# Patient Record
Sex: FEMALE | Race: WHITE | NOT HISPANIC OR LATINO
[De-identification: names, ages, dates, MRNs, and addresses within clinical notes are randomized per-mention and may not be internally consistent; named-entity substitution may affect disease eponyms.]

---

## 2017-03-06 ENCOUNTER — APPOINTMENT (OUTPATIENT)
Dept: INFUSION THERAPY | Facility: CLINIC | Age: 72
End: 2017-03-06

## 2017-03-06 ENCOUNTER — LABORATORY RESULT (OUTPATIENT)
Age: 72
End: 2017-03-06

## 2017-03-06 ENCOUNTER — APPOINTMENT (OUTPATIENT)
Dept: HEMATOLOGY ONCOLOGY | Facility: CLINIC | Age: 72
End: 2017-03-06

## 2017-03-06 VITALS
WEIGHT: 157 LBS | HEIGHT: 64 IN | TEMPERATURE: 98.5 F | DIASTOLIC BLOOD PRESSURE: 84 MMHG | BODY MASS INDEX: 26.8 KG/M2 | HEART RATE: 75 BPM | SYSTOLIC BLOOD PRESSURE: 144 MMHG

## 2017-03-06 LAB
HCT VFR BLD CALC: 46.4 %
HGB BLD-MCNC: 15.2 G/DL
MCHC RBC-ENTMCNC: 29.2 PG
MCHC RBC-ENTMCNC: 32.8 GM/DL
MCV RBC AUTO: 89 FL
PLATELET # BLD AUTO: 402 K/UL
RBC # BLD: 5.22 M/UL
RBC # FLD: 14.4 %
WBC # FLD AUTO: 6.7 K/UL

## 2017-03-13 LAB
25(OH)D3 SERPL-MCNC: 28.4 NG/ML
ALBUMIN SERPL ELPH-MCNC: 4.1 G/DL
ALP BLD-CCNC: 80 U/L
ALT SERPL-CCNC: 15 U/L
ANION GAP SERPL CALC-SCNC: 15 MMOL/L
AST SERPL-CCNC: 17 U/L
BILIRUB SERPL-MCNC: 0.4 MG/DL
BUN SERPL-MCNC: 20 MG/DL
CALCIUM SERPL-MCNC: 9.8 MG/DL
CHLORIDE SERPL-SCNC: 97 MMOL/L
CO2 SERPL-SCNC: 23 MMOL/L
CREAT SERPL-MCNC: 0.84 MG/DL
GLUCOSE SERPL-MCNC: 84 MG/DL
POTASSIUM SERPL-SCNC: 5.1 MMOL/L
PROT SERPL-MCNC: 6.9 G/DL
SODIUM SERPL-SCNC: 135 MMOL/L

## 2017-03-20 ENCOUNTER — LABORATORY RESULT (OUTPATIENT)
Age: 72
End: 2017-03-20

## 2017-03-20 ENCOUNTER — APPOINTMENT (OUTPATIENT)
Dept: INFUSION THERAPY | Facility: CLINIC | Age: 72
End: 2017-03-20

## 2017-03-20 VITALS — DIASTOLIC BLOOD PRESSURE: 77 MMHG | TEMPERATURE: 98.5 F | SYSTOLIC BLOOD PRESSURE: 124 MMHG | HEART RATE: 75 BPM

## 2017-03-20 LAB
HCT VFR BLD CALC: 43 %
HGB BLD-MCNC: 14.1 G/DL
MCHC RBC-ENTMCNC: 29 PG
MCHC RBC-ENTMCNC: 32.9 GM/DL
MCV RBC AUTO: 88.2 FL
PLATELET # BLD AUTO: 399 K/UL
RBC # BLD: 4.87 M/UL
RBC # FLD: 14.8 %
WBC # FLD AUTO: 6.4 K/UL

## 2017-03-27 ENCOUNTER — EMERGENCY (EMERGENCY)
Facility: HOSPITAL | Age: 72
LOS: 0 days | Discharge: HOME | End: 2017-03-27
Attending: EMERGENCY MEDICINE | Admitting: EMERGENCY MEDICINE
Payer: MEDICARE

## 2017-03-27 VITALS
OXYGEN SATURATION: 100 % | DIASTOLIC BLOOD PRESSURE: 87 MMHG | WEIGHT: 119.93 LBS | SYSTOLIC BLOOD PRESSURE: 135 MMHG | RESPIRATION RATE: 14 BRPM | HEIGHT: 64 IN | HEART RATE: 94 BPM | TEMPERATURE: 32 F

## 2017-03-27 VITALS
DIASTOLIC BLOOD PRESSURE: 82 MMHG | OXYGEN SATURATION: 99 % | TEMPERATURE: 98 F | SYSTOLIC BLOOD PRESSURE: 147 MMHG | HEART RATE: 70 BPM | RESPIRATION RATE: 16 BRPM

## 2017-03-27 DIAGNOSIS — Z90.710 ACQUIRED ABSENCE OF BOTH CERVIX AND UTERUS: Chronic | ICD-10-CM

## 2017-03-27 DIAGNOSIS — R69 ILLNESS, UNSPECIFIED: ICD-10-CM

## 2017-03-27 DIAGNOSIS — Z79.899 OTHER LONG TERM (CURRENT) DRUG THERAPY: ICD-10-CM

## 2017-03-27 DIAGNOSIS — F31.9 BIPOLAR DISORDER, UNSPECIFIED: ICD-10-CM

## 2017-03-27 DIAGNOSIS — R44.2 OTHER HALLUCINATIONS: ICD-10-CM

## 2017-03-27 LAB
ALBUMIN SERPL ELPH-MCNC: 3.6 G/DL — SIGNIFICANT CHANGE UP (ref 3.3–5)
ALP SERPL-CCNC: 84 U/L — SIGNIFICANT CHANGE UP (ref 40–120)
ALT FLD-CCNC: 15 U/L — SIGNIFICANT CHANGE UP (ref 12–78)
AMPHET UR-MCNC: NEGATIVE — SIGNIFICANT CHANGE UP
ANION GAP SERPL CALC-SCNC: 8 MMOL/L — SIGNIFICANT CHANGE UP (ref 5–17)
APAP SERPL-MCNC: <2 UG/ML — LOW (ref 10–30)
APPEARANCE UR: CLEAR — SIGNIFICANT CHANGE UP
AST SERPL-CCNC: 14 U/L — LOW (ref 15–37)
BARBITURATES UR SCN-MCNC: NEGATIVE — SIGNIFICANT CHANGE UP
BASOPHILS # BLD AUTO: 0.1 K/UL — SIGNIFICANT CHANGE UP (ref 0–0.2)
BASOPHILS NFR BLD AUTO: 1.4 % — SIGNIFICANT CHANGE UP (ref 0–2)
BENZODIAZ UR-MCNC: NEGATIVE — SIGNIFICANT CHANGE UP
BILIRUB SERPL-MCNC: 0.4 MG/DL — SIGNIFICANT CHANGE UP (ref 0.2–1.2)
BILIRUB UR-MCNC: NEGATIVE — SIGNIFICANT CHANGE UP
BUN SERPL-MCNC: 15 MG/DL — SIGNIFICANT CHANGE UP (ref 7–23)
CALCIUM SERPL-MCNC: 9.2 MG/DL — SIGNIFICANT CHANGE UP (ref 8.5–10.1)
CHLORIDE SERPL-SCNC: 105 MMOL/L — SIGNIFICANT CHANGE UP (ref 96–108)
CO2 SERPL-SCNC: 27 MMOL/L — SIGNIFICANT CHANGE UP (ref 22–31)
COCAINE METAB.OTHER UR-MCNC: NEGATIVE — SIGNIFICANT CHANGE UP
COLOR SPEC: YELLOW — SIGNIFICANT CHANGE UP
CREAT SERPL-MCNC: 0.78 MG/DL — SIGNIFICANT CHANGE UP (ref 0.5–1.3)
DIFF PNL FLD: NEGATIVE — SIGNIFICANT CHANGE UP
EOSINOPHIL # BLD AUTO: 0.1 K/UL — SIGNIFICANT CHANGE UP (ref 0–0.5)
EOSINOPHIL NFR BLD AUTO: 0.7 % — SIGNIFICANT CHANGE UP (ref 0–6)
ETHANOL SERPL-MCNC: <10 MG/DL — SIGNIFICANT CHANGE UP (ref 0–10)
GLUCOSE SERPL-MCNC: 89 MG/DL — SIGNIFICANT CHANGE UP (ref 70–99)
GLUCOSE UR QL: NEGATIVE MG/DL — SIGNIFICANT CHANGE UP
HCT VFR BLD CALC: 45 % — SIGNIFICANT CHANGE UP (ref 34.5–45)
HGB BLD-MCNC: 14.9 G/DL — SIGNIFICANT CHANGE UP (ref 11.5–15.5)
KETONES UR-MCNC: NEGATIVE — SIGNIFICANT CHANGE UP
LEUKOCYTE ESTERASE UR-ACNC: NEGATIVE — SIGNIFICANT CHANGE UP
LYMPHOCYTES # BLD AUTO: 1.8 K/UL — SIGNIFICANT CHANGE UP (ref 1–3.3)
LYMPHOCYTES # BLD AUTO: 21.3 % — SIGNIFICANT CHANGE UP (ref 13–44)
MCHC RBC-ENTMCNC: 29.4 PG — SIGNIFICANT CHANGE UP (ref 27–34)
MCHC RBC-ENTMCNC: 33.2 GM/DL — SIGNIFICANT CHANGE UP (ref 32–36)
MCV RBC AUTO: 88.6 FL — SIGNIFICANT CHANGE UP (ref 80–100)
METHADONE UR-MCNC: NEGATIVE — SIGNIFICANT CHANGE UP
MONOCYTES # BLD AUTO: 0.8 K/UL — SIGNIFICANT CHANGE UP (ref 0–0.9)
MONOCYTES NFR BLD AUTO: 9.4 % — SIGNIFICANT CHANGE UP (ref 2–14)
NEUTROPHILS # BLD AUTO: 5.8 K/UL — SIGNIFICANT CHANGE UP (ref 1.8–7.4)
NEUTROPHILS NFR BLD AUTO: 67.2 % — SIGNIFICANT CHANGE UP (ref 43–77)
NITRITE UR-MCNC: NEGATIVE — SIGNIFICANT CHANGE UP
OPIATES UR-MCNC: NEGATIVE — SIGNIFICANT CHANGE UP
PCP SPEC-MCNC: SIGNIFICANT CHANGE UP
PCP UR-MCNC: NEGATIVE — SIGNIFICANT CHANGE UP
PH UR: 7 — SIGNIFICANT CHANGE UP (ref 4.8–8)
PLATELET # BLD AUTO: 475 K/UL — HIGH (ref 150–400)
POTASSIUM SERPL-MCNC: 4.4 MMOL/L — SIGNIFICANT CHANGE UP (ref 3.5–5.3)
POTASSIUM SERPL-SCNC: 4.4 MMOL/L — SIGNIFICANT CHANGE UP (ref 3.5–5.3)
PROT SERPL-MCNC: 7.3 GM/DL — SIGNIFICANT CHANGE UP (ref 6–8.3)
PROT UR-MCNC: NEGATIVE MG/DL — SIGNIFICANT CHANGE UP
RBC # BLD: 5.07 M/UL — SIGNIFICANT CHANGE UP (ref 3.8–5.2)
RBC # FLD: 14.6 % — HIGH (ref 10.3–14.5)
SALICYLATES SERPL-MCNC: 3.4 MG/DL — SIGNIFICANT CHANGE UP (ref 2.8–20)
SODIUM SERPL-SCNC: 140 MMOL/L — SIGNIFICANT CHANGE UP (ref 135–145)
SP GR SPEC: 1 — LOW (ref 1.01–1.02)
THC UR QL: NEGATIVE — SIGNIFICANT CHANGE UP
UROBILINOGEN FLD QL: NEGATIVE MG/DL — SIGNIFICANT CHANGE UP
VALPROATE SERPL-MCNC: 59 UG/ML — SIGNIFICANT CHANGE UP (ref 50–100)
WBC # BLD: 8.6 K/UL — SIGNIFICANT CHANGE UP (ref 3.8–10.5)
WBC # FLD AUTO: 8.6 K/UL — SIGNIFICANT CHANGE UP (ref 3.8–10.5)

## 2017-03-27 PROCEDURE — 99284 EMERGENCY DEPT VISIT MOD MDM: CPT

## 2017-03-27 NOTE — ED BEHAVIORAL HEALTH ASSESSMENT NOTE - CURRENT MEDICATION
Zyprexa 20 mg po HS                    vitD3, hydrochorthiazide, Protonix  Depakote 750 mg po HS  Trazodone 50 mg po HS prn sleep

## 2017-03-27 NOTE — ED ADULT NURSE REASSESSMENT NOTE - NS ED NURSE REASSESS COMMENT FT1
pt received. alert and oriented. no signs of distress. breathing even and unlabored. daughter at bedside. awaiting d/c. will monitor.

## 2017-03-27 NOTE — ED BEHAVIORAL HEALTH ASSESSMENT NOTE - SUICIDE PROTECTIVE FACTORS
High spirituality/Positive therapeutic relationships/Future oriented/Responsibility to family and others/Supportive social network or family/Identifies reasons for living

## 2017-03-27 NOTE — ED BEHAVIORAL HEALTH ASSESSMENT NOTE - DETAILS
daughters, son  in  Pt reports had fleeting SI this AM, had no plan or intent for same, but did identify OD when MD asked how she would hurt self depression and father suicided when pt age 27 grandchildren Baljinder Jolly RN

## 2017-03-27 NOTE — ED BEHAVIORAL HEALTH ASSESSMENT NOTE - DESCRIPTION
Calm and cooperative in ED HYN, hyperlipidemia lives at [facility] supported housing and states is comfortable with 2 roommates

## 2017-03-27 NOTE — ED BEHAVIORAL HEALTH ASSESSMENT NOTE - SUMMARY
72 y.o. DF with hx of depression and paranoia. and OD many years ago, cannot recall date.    Sleep was impaired 2 nights ago and today after sleeping well last night experienced racing thoughts and called [facility] and spoke with MD. MD sent police to bring pt. to ED for evaluation. Pt. has remained calm and goal directed, stating that feels better and thoughts no longer racing.   Denies SIIP/HIIP. denies paranoia, and hallucinations of any type.   Is futuristic in her thinking and declines a voluntary admission.    Discussed clinical info with Dr. Childers who has history with pt. during last 5N admission in 12/16. Dr. Childers agrees that pt does not meet criteria for 2PC admission at this time and has support system with housing and clinicians in community. Compliant with meds 72 y.o. DF with hx of depression and paranoia. and OD many years ago, cannot recall date.    Sleep was impaired 2 nights ago and today after sleeping well last night experienced racing thoughts and called [facility] and spoke with MD. MD sent police to bring pt. to ED for evaluation. Pt. has remained calm and goal directed, stating that feels better and thoughts no longer racing.   Denies SIIP/HIIP. denies paranoia, and hallucinations of any type.   Is futuristic in her thinking and declines a voluntary admission.    Discussed clinical info with Dr. Childers who has history with pt. during last 5N admission in 12/16. Dr. Childers agrees that pt does not meet criteria for 2PC admission at this time and has support system with housing and clinicians in community. Compliant with meds  Valproic acid : 59; medically cleared , no s/s UTI or infection.

## 2017-03-27 NOTE — ED BEHAVIORAL HEALTH ASSESSMENT NOTE - OTHER
therapist, Blanca Miller, [facility] family stressors grandchildren substance relapse is worried aboout grandchildren Stressors with grandchildren

## 2017-03-27 NOTE — ED PROVIDER NOTE - OBJECTIVE STATEMENT
73 y/o female BIBA from [facility] for community living, for racing thoughts and stating she wants to harm herself. Last pysch hospitalization was on 12/02/2016 at . Per living facility paperwork, pt has a h/o bipolar disorder. Pt stating she is having racing thoughts and wants to "take medicine to sleep to make them stop".

## 2017-03-27 NOTE — ED BEHAVIORAL HEALTH ASSESSMENT NOTE - RISK ASSESSMENT
completed: Pt dos not present imminent risk to self or others at this time.   She has supports and utilized them appropriately this AM, calling CM services and clinic.   No longer experiencing racing thoughts and has plans for impending Easter holiday with family and is hopeful to get a P/T job.  Family and clinicians are supportive

## 2017-03-27 NOTE — ED PROVIDER NOTE - NS ED MD SCRIBE ATTENDING SCRIBE SECTIONS
REVIEW OF SYSTEMS/HISTORY OF PRESENT ILLNESS/DISPOSITION/PHYSICAL EXAM/PROGRESS NOTE/PAST MEDICAL/SURGICAL/SOCIAL HISTORY/RESULTS

## 2017-03-27 NOTE — ED BEHAVIORAL HEALTH ASSESSMENT NOTE - HPI (INCLUDE ILLNESS QUALITY, SEVERITY, DURATION, TIMING, CONTEXT, MODIFYING FACTORS, ASSOCIATED SIGNS AND SYMPTOMS)
72 y.o. DF with hx of depression and paranoia from report of last admission in 12/16 per Dr. valerio. Pt. has been compliant with medications and states "the trigger" must be the grandchildren. Pt. reports her daughter tells her not to help the children. No call back from daughter  as yet.  Pt. reports that she experienced racing thoughts and called her MD and head of  this AM. MD asked how she would harm self and pt reports"I said pills , it is the way I did many years ago". Pt. states that "I just wanted to sleep to have the feeling pass".   Currently after calmly sitting in ED awaiting evaluation, pt states that she feels much, calmer,  better. "I don't want to die, I want to be with my grandchildren for the Oriental orthodox holiday coming, Easter". Pt has 10 grandchildren in total. Denies current racing thoughts and states wants to go home.   Pt. states that she plans to see therapist on Thursday and MD on WEd, "maybe I need more depakote". Therapist states had told pt. to call if needed for an appt sooner in week. Pt. called this AM. Pt. did not expect police to come to her home.   Denies A/V/O/T hallucinations. No evidence of psychosis. States had difficulty sleeping 2 nights ago, but slept well last night and told her daughter so this AM.   Pt. reports has been applying for jobs as she wants her days to be occupied. Denies current SIIP/HIIP. No paranoia evident.

## 2017-03-27 NOTE — ED PROVIDER NOTE - PSYCHIATRIC, MLM
+racing thoughts. Pt stating she wants to take medicine to go to sleep so the racing thoughts can stop.

## 2017-03-27 NOTE — ED BEHAVIORAL HEALTH ASSESSMENT NOTE - REFERRAL / APPOINTMENT DETAILS
appt scheduled with MD, Dr. Keller on Wed, 3/29/17 and with Blanca Miller 3/30/17. Ms Miller contacting CS, Wen Eng to visit pt tomorrow to further assess

## 2017-03-27 NOTE — ED BEHAVIORAL HEALTH ASSESSMENT NOTE - PATIENT'S CHIEF COMPLAINT
"sad things happen with my daughters' children. She threw them out of the house and they ask me for hel[p"

## 2017-03-27 NOTE — ED BEHAVIORAL HEALTH ASSESSMENT NOTE - PROFESSIONAL COLLATERAL RELATIONSHIP
director of clinic and therapist: pt reportedly called MD this AM and said she was experiencing racing thoughts and SI. When asked how she would proceed stated OD, she wanted to sleep. Upset re: grandchildren with substance abuse. Therapist states had been doing well. DId appear more depressed and anxious last Thurs in session. Encouraged to be strong and call if needed a sooner appt.

## 2017-05-22 ENCOUNTER — APPOINTMENT (OUTPATIENT)
Dept: INFUSION THERAPY | Facility: CLINIC | Age: 72
End: 2017-05-22

## 2017-05-22 ENCOUNTER — LABORATORY RESULT (OUTPATIENT)
Age: 72
End: 2017-05-22

## 2017-05-22 VITALS — SYSTOLIC BLOOD PRESSURE: 120 MMHG | HEART RATE: 90 BPM | DIASTOLIC BLOOD PRESSURE: 75 MMHG | TEMPERATURE: 98.1 F

## 2017-05-22 VITALS — DIASTOLIC BLOOD PRESSURE: 70 MMHG | SYSTOLIC BLOOD PRESSURE: 112 MMHG | HEART RATE: 87 BPM

## 2017-05-22 LAB
HCT VFR BLD CALC: 46.5 %
HGB BLD-MCNC: 15.1 G/DL
MCHC RBC-ENTMCNC: 28.7 PG
MCHC RBC-ENTMCNC: 32.5 GM/DL
MCV RBC AUTO: 88.3 FL
PLATELET # BLD AUTO: 402 K/UL
RBC # BLD: 5.27 M/UL
RBC # FLD: 15.3 %
WBC # FLD AUTO: 5.8 K/UL

## 2017-07-03 ENCOUNTER — APPOINTMENT (OUTPATIENT)
Dept: INFUSION THERAPY | Facility: CLINIC | Age: 72
End: 2017-07-03

## 2017-08-10 ENCOUNTER — LABORATORY RESULT (OUTPATIENT)
Age: 72
End: 2017-08-10

## 2017-08-10 ENCOUNTER — APPOINTMENT (OUTPATIENT)
Dept: INFUSION THERAPY | Facility: CLINIC | Age: 72
End: 2017-08-10
Payer: COMMERCIAL

## 2017-08-10 VITALS — HEART RATE: 76 BPM | TEMPERATURE: 98.6 F | DIASTOLIC BLOOD PRESSURE: 80 MMHG | SYSTOLIC BLOOD PRESSURE: 121 MMHG

## 2017-08-10 LAB
HCT VFR BLD CALC: 43.6 %
HGB BLD-MCNC: 14.5 G/DL
MCHC RBC-ENTMCNC: 29.5 PG
MCHC RBC-ENTMCNC: 33.2 GM/DL
MCV RBC AUTO: 88.7 FL
PLATELET # BLD AUTO: 424 K/UL
RBC # BLD: 4.92 M/UL
RBC # FLD: 14.5 %
WBC # FLD AUTO: 8.2 K/UL

## 2017-08-10 PROCEDURE — 85025 COMPLETE CBC W/AUTO DIFF WBC: CPT | Mod: Q5

## 2017-08-10 PROCEDURE — 36415 COLL VENOUS BLD VENIPUNCTURE: CPT | Mod: Q5

## 2017-09-11 ENCOUNTER — APPOINTMENT (OUTPATIENT)
Dept: HEMATOLOGY ONCOLOGY | Facility: CLINIC | Age: 72
End: 2017-09-11
Payer: COMMERCIAL

## 2017-09-11 ENCOUNTER — LABORATORY RESULT (OUTPATIENT)
Age: 72
End: 2017-09-11

## 2017-09-11 VITALS
DIASTOLIC BLOOD PRESSURE: 75 MMHG | BODY MASS INDEX: 26.98 KG/M2 | HEART RATE: 85 BPM | WEIGHT: 158 LBS | TEMPERATURE: 98.3 F | HEIGHT: 64 IN | SYSTOLIC BLOOD PRESSURE: 124 MMHG

## 2017-09-11 DIAGNOSIS — Z79.899 ENCOUNTER FOR THERAPEUTIC DRUG LVL MONITORING: ICD-10-CM

## 2017-09-11 DIAGNOSIS — Z51.81 ENCOUNTER FOR THERAPEUTIC DRUG LVL MONITORING: ICD-10-CM

## 2017-09-11 LAB
HCT VFR BLD CALC: 46.2 %
HGB BLD-MCNC: 15.4 G/DL
MCHC RBC-ENTMCNC: 29.6 PG
MCHC RBC-ENTMCNC: 33.3 GM/DL
MCV RBC AUTO: 88.8 FL
PLATELET # BLD AUTO: 436 K/UL
RBC # BLD: 5.2 M/UL
RBC # FLD: 14.6 %
WBC # FLD AUTO: 7.7 K/UL

## 2017-09-11 PROCEDURE — 85025 COMPLETE CBC W/AUTO DIFF WBC: CPT

## 2017-09-11 PROCEDURE — 99214 OFFICE O/P EST MOD 30 MIN: CPT | Mod: 25

## 2017-09-11 PROCEDURE — 36415 COLL VENOUS BLD VENIPUNCTURE: CPT

## 2017-09-11 RX ORDER — ASPIRIN 81 MG/1
81 TABLET, DELAYED RELEASE ORAL DAILY
Refills: 0 | Status: ACTIVE | COMMUNITY
Start: 2017-09-11

## 2017-11-06 ENCOUNTER — LABORATORY RESULT (OUTPATIENT)
Age: 72
End: 2017-11-06

## 2017-11-06 ENCOUNTER — APPOINTMENT (OUTPATIENT)
Dept: INFUSION THERAPY | Facility: CLINIC | Age: 72
End: 2017-11-06
Payer: COMMERCIAL

## 2017-11-06 VITALS
HEART RATE: 88 BPM | BODY MASS INDEX: 26.46 KG/M2 | SYSTOLIC BLOOD PRESSURE: 110 MMHG | HEIGHT: 64 IN | WEIGHT: 155 LBS | TEMPERATURE: 98.1 F | DIASTOLIC BLOOD PRESSURE: 71 MMHG

## 2017-11-06 LAB
HCT VFR BLD CALC: 47.3 %
HGB BLD-MCNC: 16.4 G/DL
MCHC RBC-ENTMCNC: 31.1 PG
MCHC RBC-ENTMCNC: 34.6 GM/DL
MCV RBC AUTO: 89.7 FL
PLATELET # BLD AUTO: 418 K/UL
RBC # BLD: 5.27 M/UL
RBC # FLD: 16.5 %
WBC # FLD AUTO: 9.6 K/UL

## 2017-11-06 PROCEDURE — 99195 PHLEBOTOMY: CPT | Mod: 59

## 2017-11-06 PROCEDURE — 85025 COMPLETE CBC W/AUTO DIFF WBC: CPT

## 2017-11-06 PROCEDURE — 36415 COLL VENOUS BLD VENIPUNCTURE: CPT

## 2018-01-08 ENCOUNTER — APPOINTMENT (OUTPATIENT)
Dept: INFUSION THERAPY | Facility: CLINIC | Age: 73
End: 2018-01-08

## 2018-01-25 ENCOUNTER — LABORATORY RESULT (OUTPATIENT)
Age: 73
End: 2018-01-25

## 2018-01-25 ENCOUNTER — APPOINTMENT (OUTPATIENT)
Dept: INFUSION THERAPY | Facility: CLINIC | Age: 73
End: 2018-01-25
Payer: MEDICARE

## 2018-01-25 VITALS
WEIGHT: 155 LBS | HEART RATE: 76 BPM | TEMPERATURE: 98.2 F | DIASTOLIC BLOOD PRESSURE: 80 MMHG | HEIGHT: 64 IN | SYSTOLIC BLOOD PRESSURE: 136 MMHG | BODY MASS INDEX: 26.46 KG/M2

## 2018-01-25 LAB
HCT VFR BLD CALC: 43.6 %
HGB BLD-MCNC: 14.2 G/DL
MCHC RBC-ENTMCNC: 28.7 PG
MCHC RBC-ENTMCNC: 32.5 GM/DL
MCV RBC AUTO: 88.3 FL
PLATELET # BLD AUTO: 481 K/UL
RBC # BLD: 4.93 M/UL
RBC # FLD: 14.3 %
WBC # FLD AUTO: 7.9 K/UL

## 2018-01-25 PROCEDURE — 36415 COLL VENOUS BLD VENIPUNCTURE: CPT | Mod: Q5

## 2018-01-25 PROCEDURE — 85025 COMPLETE CBC W/AUTO DIFF WBC: CPT | Mod: Q5

## 2018-03-12 ENCOUNTER — LABORATORY RESULT (OUTPATIENT)
Age: 73
End: 2018-03-12

## 2018-03-12 ENCOUNTER — APPOINTMENT (OUTPATIENT)
Dept: HEMATOLOGY ONCOLOGY | Facility: CLINIC | Age: 73
End: 2018-03-12
Payer: MEDICARE

## 2018-03-12 VITALS
TEMPERATURE: 98.6 F | BODY MASS INDEX: 26.29 KG/M2 | HEIGHT: 64 IN | HEART RATE: 83 BPM | SYSTOLIC BLOOD PRESSURE: 126 MMHG | WEIGHT: 154 LBS | DIASTOLIC BLOOD PRESSURE: 80 MMHG

## 2018-03-12 DIAGNOSIS — Z82.49 FAMILY HISTORY OF ISCHEMIC HEART DISEASE AND OTHER DISEASES OF THE CIRCULATORY SYSTEM: ICD-10-CM

## 2018-03-12 DIAGNOSIS — E55.9 VITAMIN D DEFICIENCY, UNSPECIFIED: ICD-10-CM

## 2018-03-12 DIAGNOSIS — F17.200 NICOTINE DEPENDENCE, UNSPECIFIED, UNCOMPLICATED: ICD-10-CM

## 2018-03-12 DIAGNOSIS — E78.5 HYPERLIPIDEMIA, UNSPECIFIED: ICD-10-CM

## 2018-03-12 DIAGNOSIS — Z92.3 PERSONAL HISTORY OF IRRADIATION: ICD-10-CM

## 2018-03-12 LAB
HCT VFR BLD CALC: 47.8 %
HGB BLD-MCNC: 15.7 G/DL
MCHC RBC-ENTMCNC: 28.6 PG
MCHC RBC-ENTMCNC: 32.9 GM/DL
MCV RBC AUTO: 87 FL
PLATELET # BLD AUTO: 493 K/UL
RBC # BLD: 5.5 M/UL
RBC # FLD: 16.2 %
WBC # FLD AUTO: 6.4 K/UL

## 2018-03-12 PROCEDURE — 99213 OFFICE O/P EST LOW 20 MIN: CPT | Mod: 25

## 2018-03-12 PROCEDURE — 85025 COMPLETE CBC W/AUTO DIFF WBC: CPT

## 2018-03-12 PROCEDURE — 36415 COLL VENOUS BLD VENIPUNCTURE: CPT

## 2018-03-12 RX ORDER — FLUTICASONE PROPIONATE 50 UG/1
50 SPRAY, METERED NASAL
Refills: 0 | Status: DISCONTINUED | COMMUNITY
End: 2018-03-12

## 2018-03-12 RX ORDER — MELOXICAM 15 MG/1
15 TABLET ORAL
Refills: 0 | Status: DISCONTINUED | COMMUNITY
End: 2018-03-12

## 2018-03-12 RX ORDER — ALBUTEROL 90 MCG
AEROSOL (GRAM) INHALATION
Refills: 0 | Status: DISCONTINUED | COMMUNITY
End: 2018-03-12

## 2018-03-12 RX ORDER — NICOTINE POLACRILEX 2 MG/1
2 GUM, CHEWING BUCCAL
Refills: 0 | Status: DISCONTINUED | COMMUNITY
End: 2018-03-12

## 2018-03-15 ENCOUNTER — APPOINTMENT (OUTPATIENT)
Dept: INFUSION THERAPY | Facility: CLINIC | Age: 73
End: 2018-03-15

## 2018-03-21 ENCOUNTER — APPOINTMENT (OUTPATIENT)
Dept: HEMATOLOGY ONCOLOGY | Facility: CLINIC | Age: 73
End: 2018-03-21

## 2018-03-21 ENCOUNTER — APPOINTMENT (OUTPATIENT)
Dept: INFUSION THERAPY | Facility: CLINIC | Age: 73
End: 2018-03-21

## 2018-03-28 ENCOUNTER — LABORATORY RESULT (OUTPATIENT)
Age: 73
End: 2018-03-28

## 2018-03-28 ENCOUNTER — APPOINTMENT (OUTPATIENT)
Dept: INFUSION THERAPY | Facility: CLINIC | Age: 73
End: 2018-03-28
Payer: MEDICARE

## 2018-03-28 VITALS
DIASTOLIC BLOOD PRESSURE: 84 MMHG | HEIGHT: 64 IN | HEART RATE: 65 BPM | SYSTOLIC BLOOD PRESSURE: 137 MMHG | WEIGHT: 154 LBS | BODY MASS INDEX: 26.29 KG/M2 | TEMPERATURE: 97.9 F

## 2018-03-28 LAB
HCT VFR BLD CALC: 47.7 %
HGB BLD-MCNC: 15.2 G/DL
MCHC RBC-ENTMCNC: 27.8 PG
MCHC RBC-ENTMCNC: 31.8 GM/DL
MCV RBC AUTO: 87.6 FL
PLATELET # BLD AUTO: 456 K/UL
RBC # BLD: 5.44 M/UL
RBC # FLD: 16.8 %
WBC # FLD AUTO: 8.7 K/UL

## 2018-03-28 PROCEDURE — 99195 PHLEBOTOMY: CPT | Mod: 59

## 2018-03-28 PROCEDURE — 36415 COLL VENOUS BLD VENIPUNCTURE: CPT

## 2018-03-28 PROCEDURE — 85025 COMPLETE CBC W/AUTO DIFF WBC: CPT

## 2018-05-21 ENCOUNTER — APPOINTMENT (OUTPATIENT)
Dept: INFUSION THERAPY | Facility: CLINIC | Age: 73
End: 2018-05-21
Payer: MEDICARE

## 2018-05-24 ENCOUNTER — LABORATORY RESULT (OUTPATIENT)
Age: 73
End: 2018-05-24

## 2018-05-24 ENCOUNTER — APPOINTMENT (OUTPATIENT)
Dept: INFUSION THERAPY | Facility: CLINIC | Age: 73
End: 2018-05-24
Payer: MEDICARE

## 2018-05-24 VITALS
TEMPERATURE: 98.5 F | DIASTOLIC BLOOD PRESSURE: 70 MMHG | SYSTOLIC BLOOD PRESSURE: 111 MMHG | BODY MASS INDEX: 26.29 KG/M2 | HEIGHT: 64 IN | WEIGHT: 154 LBS | HEART RATE: 85 BPM

## 2018-05-24 LAB
HCT VFR BLD CALC: 40.8 %
HGB BLD-MCNC: 12.9 G/DL
MCHC RBC-ENTMCNC: 26.9 PG
MCHC RBC-ENTMCNC: 31.7 GM/DL
MCV RBC AUTO: 84.8 FL
PLATELET # BLD AUTO: 491 K/UL
RBC # BLD: 4.81 M/UL
RBC # FLD: 15.4 %
WBC # FLD AUTO: 7.8 K/UL

## 2018-05-24 PROCEDURE — 36415 COLL VENOUS BLD VENIPUNCTURE: CPT

## 2018-05-24 PROCEDURE — 85025 COMPLETE CBC W/AUTO DIFF WBC: CPT

## 2018-08-27 ENCOUNTER — APPOINTMENT (OUTPATIENT)
Dept: HEMATOLOGY ONCOLOGY | Facility: CLINIC | Age: 73
End: 2018-08-27

## 2018-08-31 PROBLEM — F31.9 BIPOLAR DISORDER, UNSPECIFIED: Chronic | Status: ACTIVE | Noted: 2017-04-24

## 2018-09-07 ENCOUNTER — APPOINTMENT (OUTPATIENT)
Dept: HEMATOLOGY ONCOLOGY | Facility: CLINIC | Age: 73
End: 2018-09-07

## 2018-09-07 ENCOUNTER — APPOINTMENT (OUTPATIENT)
Dept: INFUSION THERAPY | Facility: CLINIC | Age: 73
End: 2018-09-07
Payer: MEDICARE

## 2018-09-07 ENCOUNTER — LABORATORY RESULT (OUTPATIENT)
Age: 73
End: 2018-09-07

## 2018-09-07 VITALS — HEART RATE: 85 BPM | TEMPERATURE: 98.1 F | DIASTOLIC BLOOD PRESSURE: 78 MMHG | SYSTOLIC BLOOD PRESSURE: 120 MMHG

## 2018-09-07 PROCEDURE — 85025 COMPLETE CBC W/AUTO DIFF WBC: CPT

## 2018-09-07 PROCEDURE — 99195 PHLEBOTOMY: CPT | Mod: 59

## 2018-09-07 PROCEDURE — 36415 COLL VENOUS BLD VENIPUNCTURE: CPT

## 2018-09-11 PROBLEM — I10 HTN (HYPERTENSION): Status: ACTIVE | Noted: 2018-09-11

## 2018-09-11 PROBLEM — H26.9 BILATERAL CATARACTS: Status: RESOLVED | Noted: 2018-09-11 | Resolved: 2018-09-11

## 2018-09-12 ENCOUNTER — APPOINTMENT (OUTPATIENT)
Dept: HEMATOLOGY ONCOLOGY | Facility: CLINIC | Age: 73
End: 2018-09-12
Payer: MEDICARE

## 2018-09-12 VITALS
WEIGHT: 153 LBS | HEIGHT: 64 IN | SYSTOLIC BLOOD PRESSURE: 137 MMHG | DIASTOLIC BLOOD PRESSURE: 80 MMHG | TEMPERATURE: 98.4 F | HEART RATE: 91 BPM | BODY MASS INDEX: 26.12 KG/M2

## 2018-09-12 DIAGNOSIS — H26.9 UNSPECIFIED CATARACT: ICD-10-CM

## 2018-09-12 DIAGNOSIS — I10 ESSENTIAL (PRIMARY) HYPERTENSION: ICD-10-CM

## 2018-09-12 PROCEDURE — 99213 OFFICE O/P EST LOW 20 MIN: CPT

## 2018-09-12 RX ORDER — DIVALPROEX SODIUM 250 MG/1
250 TABLET, EXTENDED RELEASE ORAL
Refills: 0 | Status: DISCONTINUED | COMMUNITY
End: 2018-09-12

## 2018-10-09 LAB
25(OH)D3 SERPL-MCNC: 50.1 NG/ML
ALBUMIN SERPL ELPH-MCNC: 4 G/DL
ALP BLD-CCNC: 82 U/L
ALT SERPL-CCNC: 7 U/L
ANION GAP SERPL CALC-SCNC: 12 MMOL/L
AST SERPL-CCNC: 16 U/L
BILIRUB SERPL-MCNC: 0.4 MG/DL
BUN SERPL-MCNC: 14 MG/DL
CALCIUM SERPL-MCNC: 9.8 MG/DL
CHLORIDE SERPL-SCNC: 99 MMOL/L
CO2 SERPL-SCNC: 28 MMOL/L
CREAT SERPL-MCNC: 0.84 MG/DL
GLUCOSE SERPL-MCNC: 88 MG/DL
HCT VFR BLD CALC: 47.4 %
HGB BLD-MCNC: 15 G/DL
MCHC RBC-ENTMCNC: 26.9 PG
MCHC RBC-ENTMCNC: 31.7 GM/DL
MCV RBC AUTO: 85 FL
PLATELET # BLD AUTO: 490 K/UL
POTASSIUM SERPL-SCNC: 4.4 MMOL/L
PROT SERPL-MCNC: 6.7 G/DL
RBC # BLD: 5.57 M/UL
RBC # FLD: 17.8 %
SODIUM SERPL-SCNC: 139 MMOL/L
WBC # FLD AUTO: 5.6 K/UL

## 2018-12-07 ENCOUNTER — APPOINTMENT (OUTPATIENT)
Dept: INFUSION THERAPY | Facility: CLINIC | Age: 73
End: 2018-12-07
Payer: MEDICARE

## 2018-12-13 ENCOUNTER — LABORATORY RESULT (OUTPATIENT)
Age: 73
End: 2018-12-13

## 2018-12-13 ENCOUNTER — APPOINTMENT (OUTPATIENT)
Dept: INFUSION THERAPY | Facility: CLINIC | Age: 73
End: 2018-12-13
Payer: MEDICARE

## 2018-12-13 VITALS
HEART RATE: 70 BPM | BODY MASS INDEX: 25.61 KG/M2 | TEMPERATURE: 98.3 F | SYSTOLIC BLOOD PRESSURE: 128 MMHG | DIASTOLIC BLOOD PRESSURE: 77 MMHG | WEIGHT: 150 LBS | HEIGHT: 64 IN

## 2018-12-13 LAB
HCT VFR BLD CALC: 43.6 %
HGB BLD-MCNC: 13.5 G/DL
MCHC RBC-ENTMCNC: 26.3 PG
MCHC RBC-ENTMCNC: 31 GM/DL
MCV RBC AUTO: 84.7 FL
PLATELET # BLD AUTO: 513 K/UL
RBC # BLD: 5.14 M/UL
RBC # FLD: 16.6 %
WBC # FLD AUTO: 7.8 K/UL

## 2018-12-13 PROCEDURE — 36415 COLL VENOUS BLD VENIPUNCTURE: CPT | Mod: Q5

## 2018-12-13 PROCEDURE — 85025 COMPLETE CBC W/AUTO DIFF WBC: CPT | Mod: Q5

## 2019-01-15 ENCOUNTER — TRANSCRIPTION ENCOUNTER (OUTPATIENT)
Age: 74
End: 2019-01-15

## 2019-03-15 ENCOUNTER — LABORATORY RESULT (OUTPATIENT)
Age: 74
End: 2019-03-15

## 2019-03-15 ENCOUNTER — APPOINTMENT (OUTPATIENT)
Dept: HEMATOLOGY ONCOLOGY | Facility: CLINIC | Age: 74
End: 2019-03-15
Payer: MEDICARE

## 2019-03-15 VITALS
BODY MASS INDEX: 25.27 KG/M2 | WEIGHT: 148 LBS | DIASTOLIC BLOOD PRESSURE: 85 MMHG | SYSTOLIC BLOOD PRESSURE: 136 MMHG | TEMPERATURE: 98.4 F | HEIGHT: 64 IN | HEART RATE: 78 BPM

## 2019-03-15 DIAGNOSIS — Z80.3 FAMILY HISTORY OF MALIGNANT NEOPLASM OF BREAST: ICD-10-CM

## 2019-03-15 LAB
HCT VFR BLD CALC: 46.9 %
HGB BLD-MCNC: 15 G/DL
MCHC RBC-ENTMCNC: 26.6 PG
MCHC RBC-ENTMCNC: 31.9 GM/DL
MCV RBC AUTO: 83.5 FL
PLATELET # BLD AUTO: 535 K/UL
RBC # BLD: 5.62 M/UL
RBC # FLD: 17.6 %
WBC # FLD AUTO: 6.9 K/UL

## 2019-03-15 PROCEDURE — 99213 OFFICE O/P EST LOW 20 MIN: CPT | Mod: 25

## 2019-03-15 PROCEDURE — 85025 COMPLETE CBC W/AUTO DIFF WBC: CPT

## 2019-03-15 PROCEDURE — 36415 COLL VENOUS BLD VENIPUNCTURE: CPT

## 2019-03-15 NOTE — PHYSICAL EXAM
[Normal] : affect appropriate [de-identified] : smells of cigarette smoke [de-identified] : anicteric [de-identified] : no thrush or mucositis [de-identified] : no lymphadenopathy [de-identified] : S1S2 RRR, no obvious murmurs [de-identified] : no c/c/e [de-identified] : well healed right periareolar lumpectomy scar and axillary scar; breasts without masses or discharge, no axillary lymphadenopathy   [de-identified] : no rashes

## 2019-03-15 NOTE — HISTORY OF PRESENT ILLNESS
[Disease: _____________________] : Disease: [unfilled] [T: ___] : T[unfilled] [N: ___] : N[unfilled] [M: ___] : M[unfilled] [AJCC Stage: ____] : AJCC Stage: [unfilled] [de-identified] : MEGHAN HEMPHILL is a 74 y.o. who we are following for an ER+, HER2- stage IA breast cancer and now a JAK2+ MPD.\par 5/21/15 - Presented with a 6mm abnormality in the right breast on routine mammo at 6:00. \par 7/29/15 - Biopsy - well differentiated IDC that was ER/[place] strongly positive, HER2 negative.  \par 9/9/15 - Right breast lumpectomy and SLND (Primitivoi) for a T1N0 = stage IA breast cancer.\par 11/2015 - Started on Arimidex.  She is s/p adjuvant XRT. She discontinued Arimidex Fall 2016 due to musculoskeletal side effects. \par She did not want to try any other hormonal therapies. \par \par Routine blood work revealed borderline elevated H/H.  \par 7/9/16 - JAK2 mutation was detected. Has had infrequent phlebotomies (highest HCT ~ 48 %).\par Has recently also started to develop a thrombocytosis as well.  [de-identified] : well differentiated infiltrating ductal carcinoma [de-identified] : ER 90%, [place] 90%, HER2 +1 negative [de-identified] : The patient reports her daughter was recently diagnosed with breast cancer at age 55.  She was tearful when discussing this. \par She feels well. Her only complaint is still pain in the legs and back (not new), from spinal stenosis. \par No pain if sitting.  She is doing PT now. Feels this is helping.  \par She is still smoking (up to 1 PPD).\par Comes every 3 months for CBC and possible phleb.  She has been averaging a phlebotomy every other visit (that is ~ Q 6 months).  \par She denies any other changes in her family, medical, or social history since her last visit of  3/12/18.

## 2019-03-15 NOTE — ASSESSMENT
[FreeTextEntry1] : 1. Breast Cancer - The patient is a 74 y.o. with a strongly ER/KY+, HER2-, right sided stage I A breast cancer, s/p lumpectomy 9/2015, and XRT.  Unfortunately she did not tolerated  Arimidex (musculoskeletal side effects) and  refused further hormonal therapy.  Clinically - no evidence of recurrence. \par Last mammo/sono: per patient went 6/15/18  - BiRADS2.  Goes yearly. \par \par 2. Erythrocytosis/JAK2+ MPD - Gets phlebotomies PRN to maintain HCT < 45- 46% to prevent thrombotic complications.  Has been coming Q3 months for CBC, averaging a phleb every other visit, so every 6 months.  Plts slowly trending upward but OK to monitor for now. Continue ASA 81 mg. \par Patient due for a phleb today, however she does not want to have done.  \par She agreed to come back in 2 months instead of her usual 3 and was told to expect a phleb on this date. \par CBC/poss phleb in 2 months, then Q3 months, PE in 6 months.\par \par Simeon Paidoussis (PMD)\par Keiry Pitt ([facility]a)

## 2019-03-15 NOTE — REVIEW OF SYSTEMS
[Patient Intake Form Reviewed] : Patient intake form was reviewed [Negative] : Allergic/Immunologic [FreeTextEntry2] : leg pains as in interval history [de-identified] : needs sleeping pill

## 2019-03-15 NOTE — RESULTS/DATA
[FreeTextEntry1] : WBC: 6.9   ANC: 4.0   Hgb: 15.0   Hct: 46.9   MCV: 83.5   Plts: 535   \par \par Patient had labs done 3/13/19 - was faxed after she left office:\par WBC:8.56    Hgb: 14.3   Hct: 47.4   MCV: 84.6   Plts: 613

## 2019-03-16 LAB
ALBUMIN SERPL ELPH-MCNC: 4.1 G/DL
ALP BLD-CCNC: 83 U/L
ALT SERPL-CCNC: 11 U/L
ANION GAP SERPL CALC-SCNC: 13 MMOL/L
AST SERPL-CCNC: 14 U/L
BILIRUB SERPL-MCNC: 0.4 MG/DL
BUN SERPL-MCNC: 11 MG/DL
CALCIUM SERPL-MCNC: 10.2 MG/DL
CHLORIDE SERPL-SCNC: 101 MMOL/L
CO2 SERPL-SCNC: 26 MMOL/L
CREAT SERPL-MCNC: 0.79 MG/DL
GLUCOSE SERPL-MCNC: 89 MG/DL
POTASSIUM SERPL-SCNC: 4.5 MMOL/L
PROT SERPL-MCNC: 6.8 G/DL
SODIUM SERPL-SCNC: 140 MMOL/L

## 2019-03-27 NOTE — ED BEHAVIORAL HEALTH ASSESSMENT NOTE - NS ED BHA REVIEW OF ED CHART AVAILABLE INVESTIGATIONS REVIEWED
Yes
The resident's documentation has been prepared under my direction and personally reviewed by me in its entirety. I confirm that the note above accurately reflects all work, treatment, procedures, and medical decision making performed by me.  Kaz Jara MD

## 2019-05-10 ENCOUNTER — APPOINTMENT (OUTPATIENT)
Dept: INFUSION THERAPY | Facility: CLINIC | Age: 74
End: 2019-05-10
Payer: MEDICARE

## 2019-05-10 ENCOUNTER — LABORATORY RESULT (OUTPATIENT)
Age: 74
End: 2019-05-10

## 2019-05-10 VITALS
HEART RATE: 75 BPM | WEIGHT: 145.25 LBS | SYSTOLIC BLOOD PRESSURE: 120 MMHG | DIASTOLIC BLOOD PRESSURE: 79 MMHG | TEMPERATURE: 98.4 F | HEIGHT: 64 IN | BODY MASS INDEX: 24.8 KG/M2

## 2019-05-10 LAB
HCT VFR BLD CALC: 47 %
HGB BLD-MCNC: 15 G/DL
MCHC RBC-ENTMCNC: 26.8 PG
MCHC RBC-ENTMCNC: 31.9 GM/DL
MCV RBC AUTO: 84.1 FL
PLATELET # BLD AUTO: 544 K/UL
RBC # BLD: 5.59 M/UL
RBC # FLD: 17 %
WBC # FLD AUTO: 7 K/UL

## 2019-05-10 PROCEDURE — 85025 COMPLETE CBC W/AUTO DIFF WBC: CPT

## 2019-05-10 PROCEDURE — 36415 COLL VENOUS BLD VENIPUNCTURE: CPT

## 2019-05-10 PROCEDURE — 99195 PHLEBOTOMY: CPT | Mod: 59

## 2019-07-12 ENCOUNTER — LABORATORY RESULT (OUTPATIENT)
Age: 74
End: 2019-07-12

## 2019-07-12 ENCOUNTER — APPOINTMENT (OUTPATIENT)
Dept: HEMATOLOGY ONCOLOGY | Facility: CLINIC | Age: 74
End: 2019-07-12
Payer: MEDICARE

## 2019-07-12 ENCOUNTER — APPOINTMENT (OUTPATIENT)
Dept: INFUSION THERAPY | Facility: CLINIC | Age: 74
End: 2019-07-12
Payer: MEDICARE

## 2019-07-12 VITALS
TEMPERATURE: 98.1 F | DIASTOLIC BLOOD PRESSURE: 74 MMHG | RESPIRATION RATE: 20 BRPM | HEART RATE: 81 BPM | HEIGHT: 64 IN | SYSTOLIC BLOOD PRESSURE: 117 MMHG | BODY MASS INDEX: 24.75 KG/M2 | WEIGHT: 145 LBS

## 2019-07-12 LAB
HCT VFR BLD CALC: 42.9 %
HGB BLD-MCNC: 13.7 G/DL
MCHC RBC-ENTMCNC: 27.2 PG
MCHC RBC-ENTMCNC: 32 GM/DL
MCV RBC AUTO: 84.9 FL
PLATELET # BLD AUTO: 583 K/UL
RBC # BLD: 5.06 M/UL
RBC # FLD: 15.4 %
WBC # FLD AUTO: 7.2 K/UL

## 2019-07-12 PROCEDURE — 85025 COMPLETE CBC W/AUTO DIFF WBC: CPT

## 2019-07-12 PROCEDURE — 99213 OFFICE O/P EST LOW 20 MIN: CPT | Mod: 25

## 2019-07-12 PROCEDURE — 36415 COLL VENOUS BLD VENIPUNCTURE: CPT

## 2019-07-12 NOTE — ASSESSMENT
[FreeTextEntry1] : 1. Breast Cancer - The patient is a 74 y.o. with a strongly ER/SD+, HER2-, right sided stage IA breast cancer, s/p lumpectomy 9/2015, and XRT.  Unfortunately she did not tolerated  Arimidex (musculoskeletal side effects) and  refused further hormonal therapy.  Clinically - no evidence of recurrence.  Breast exam by me 3 months ago.\par Last mammo/sono: per patient went June 2019 ([facility]) and was OK.  Goes yearly. \par \par 2. Erythrocytosis/JAK2+ MPD - Gets phlebotomies PRN to maintain HCT < 45- 46% to prevent thrombotic complications.  Has been coming Q3 months for CBC, averaging a phleb every other visit, so every 6 months.  Plts slowly trending upward but OK to monitor for now. Continue ASA 81 mg. \par No phlebotomy needed today.  \par CBC/poss phleb in 3 months, PE in 6 months.\par \par Simeon Paidoussis (PMD)\par Keiry Pitt ([facility]a)

## 2019-07-12 NOTE — REASON FOR VISIT
[Follow-Up Visit] : a follow-up [FreeTextEntry2] : JAK2+ PV, nearing 4 years post op for stage IA BC

## 2019-07-12 NOTE — HISTORY OF PRESENT ILLNESS
[Disease: _____________________] : Disease: [unfilled] [T: ___] : T[unfilled] [N: ___] : N[unfilled] [M: ___] : M[unfilled] [AJCC Stage: ____] : AJCC Stage: [unfilled] [de-identified] : MEGHAN HEMPHILL is a 74 y.o. who we are following for an ER+, HER2- stage IA breast cancer and now a JAK2+ MPD.\par 5/21/15 - Presented with a 6mm abnormality in the right breast on routine mammo at 6:00. \par 7/29/15 - Biopsy - well differentiated IDC that was ER/AK strongly positive, HER2 negative.  \par 9/9/15 - Right breast lumpectomy and SLND (Primitivoi) for a T1N0 = stage IA breast cancer.\par 11/2015 - Started on Arimidex.  She is s/p adjuvant XRT. She discontinued Arimidex Fall 2016 due to musculoskeletal side effects. \par She did not want to try any other hormonal therapies. \par \par Routine blood work revealed borderline elevated H/H.  \par 7/9/16 - JAK2 mutation was detected. Has had infrequent phlebotomies (highest HCT ~ 48 %).\par Has recently also started to develop a thrombocytosis as well.  [de-identified] : ER 90%, [place] 90%, HER2 +1 negative [de-identified] : well differentiated infiltrating ductal carcinoma [de-identified] : The patient's only complaint is still pain in the legs and back (not new), from spinal stenosis.  No pain if sitting.    \par She is still smoking (up to 1 PPD).\martha Comes every 3 months for CBC and possible phleb.  She has been averaging a phlebotomy every other visit ( ~ Q 6 months).  \par Leaving for [place] next week for 1 month. \par She denies any other changes in her family, medical, or social history since her last visit of 3/15/19.

## 2019-07-12 NOTE — REVIEW OF SYSTEMS
[Patient Intake Form Reviewed] : Patient intake form was reviewed [Negative] : Allergic/Immunologic [FreeTextEntry2] : leg pains as in interval history; 3 - 5 pound weight since since December.  [FreeTextEntry7] : hasn't been as hungry recently.  [de-identified] : needs sleeping pill

## 2019-07-12 NOTE — PHYSICAL EXAM
[Normal] : affect appropriate [de-identified] : no thrush or mucositis [de-identified] : anicteric [de-identified] : no lymphadenopathy [de-identified] : no c/c/e [de-identified] : S1S2 RRR, no obvious murmurs [de-identified] : no axillary lymphadenopathy   [de-identified] : no rashes

## 2019-07-12 NOTE — RESULTS/DATA
[FreeTextEntry1] : WBC:7.2   ANC: 4.5   Hgb: 13.7   Hct: 42.9   MCV: 84.9   Plts: 583  \par \par Patient had labs done 6/22/19: \par WBC 5.8    Hgb: 13.7   Hct: 45.5   MCV: 87.0  Plts: 662

## 2019-09-10 ENCOUNTER — OUTPATIENT (OUTPATIENT)
Dept: OUTPATIENT SERVICES | Facility: HOSPITAL | Age: 74
LOS: 1 days | Discharge: HOME | End: 2019-09-10
Payer: COMMERCIAL

## 2019-09-10 DIAGNOSIS — Z90.710 ACQUIRED ABSENCE OF BOTH CERVIX AND UTERUS: Chronic | ICD-10-CM

## 2019-09-10 DIAGNOSIS — M54.16 RADICULOPATHY, LUMBAR REGION: ICD-10-CM

## 2019-09-10 PROCEDURE — 62323 NJX INTERLAMINAR LMBR/SAC: CPT

## 2019-09-10 PROCEDURE — 77003 FLUOROGUIDE FOR SPINE INJECT: CPT

## 2019-10-10 ENCOUNTER — OTHER (OUTPATIENT)
Age: 74
End: 2019-10-10

## 2020-01-10 ENCOUNTER — TRANSCRIPTION ENCOUNTER (OUTPATIENT)
Age: 75
End: 2020-01-10

## 2020-01-13 ENCOUNTER — OUTPATIENT (OUTPATIENT)
Dept: OUTPATIENT SERVICES | Facility: HOSPITAL | Age: 75
LOS: 1 days | Discharge: HOME | End: 2020-01-13

## 2020-01-13 DIAGNOSIS — D45 POLYCYTHEMIA VERA: ICD-10-CM

## 2020-01-13 DIAGNOSIS — Z90.710 ACQUIRED ABSENCE OF BOTH CERVIX AND UTERUS: Chronic | ICD-10-CM

## 2020-01-14 ENCOUNTER — RESULT REVIEW (OUTPATIENT)
Age: 75
End: 2020-01-14

## 2020-01-14 ENCOUNTER — APPOINTMENT (OUTPATIENT)
Dept: HEMATOLOGY ONCOLOGY | Facility: CLINIC | Age: 75
End: 2020-01-14
Payer: MEDICARE

## 2020-01-14 LAB
BASOPHILS # BLD AUTO: 0.11 K/UL — SIGNIFICANT CHANGE UP (ref 0–0.2)
BASOPHILS NFR BLD AUTO: 1.1 % — SIGNIFICANT CHANGE UP (ref 0–2)
EOSINOPHIL # BLD AUTO: 0.05 K/UL — SIGNIFICANT CHANGE UP (ref 0–0.5)
EOSINOPHIL NFR BLD AUTO: 0.5 % — SIGNIFICANT CHANGE UP (ref 0–6)
HCT VFR BLD CALC: 44.4 % — SIGNIFICANT CHANGE UP (ref 34.5–45)
HGB BLD-MCNC: 14.3 G/DL — SIGNIFICANT CHANGE UP (ref 11.5–15.5)
IMM GRANULOCYTES NFR BLD AUTO: 0.6 % — SIGNIFICANT CHANGE UP (ref 0–1.5)
LYMPHOCYTES # BLD AUTO: 19.8 % — SIGNIFICANT CHANGE UP (ref 13–44)
LYMPHOCYTES # BLD AUTO: 2.05 K/UL — SIGNIFICANT CHANGE UP (ref 1–3.3)
MCHC RBC-ENTMCNC: 26.3 PG — LOW (ref 27–34)
MCHC RBC-ENTMCNC: 32.2 GM/DL — SIGNIFICANT CHANGE UP (ref 32–36)
MCV RBC AUTO: 81.6 FL — SIGNIFICANT CHANGE UP (ref 80–100)
MONOCYTES # BLD AUTO: 1.14 K/UL — HIGH (ref 0–0.9)
MONOCYTES NFR BLD AUTO: 11 % — SIGNIFICANT CHANGE UP (ref 2–14)
NEUTROPHILS # BLD AUTO: 6.92 K/UL — SIGNIFICANT CHANGE UP (ref 1.8–7.4)
NEUTROPHILS NFR BLD AUTO: 67 % — SIGNIFICANT CHANGE UP (ref 43–77)
NRBC # BLD: 0 /100 WBCS — SIGNIFICANT CHANGE UP (ref 0–0)
PLATELET # BLD AUTO: 623 K/UL — HIGH (ref 150–400)
RBC # BLD: 5.44 M/UL — HIGH (ref 3.8–5.2)
RBC # FLD: 18.1 % — HIGH (ref 10.3–14.5)
WBC # BLD: 10.33 K/UL — SIGNIFICANT CHANGE UP (ref 3.8–10.5)
WBC # FLD AUTO: 10.33 K/UL — SIGNIFICANT CHANGE UP (ref 3.8–10.5)

## 2020-01-14 PROCEDURE — 99499A: CUSTOM | Mod: NC

## 2020-02-10 ENCOUNTER — RESULT REVIEW (OUTPATIENT)
Age: 75
End: 2020-02-10

## 2020-02-10 ENCOUNTER — APPOINTMENT (OUTPATIENT)
Dept: HEMATOLOGY ONCOLOGY | Facility: CLINIC | Age: 75
End: 2020-02-10
Payer: MEDICARE

## 2020-02-10 VITALS
WEIGHT: 144.6 LBS | DIASTOLIC BLOOD PRESSURE: 79 MMHG | TEMPERATURE: 97.6 F | BODY MASS INDEX: 24.69 KG/M2 | HEART RATE: 68 BPM | SYSTOLIC BLOOD PRESSURE: 134 MMHG | RESPIRATION RATE: 18 BRPM | HEIGHT: 64 IN

## 2020-02-10 LAB
BASOPHILS # BLD AUTO: 0.07 K/UL — SIGNIFICANT CHANGE UP (ref 0–0.2)
BASOPHILS NFR BLD AUTO: 0.8 % — SIGNIFICANT CHANGE UP (ref 0–2)
EOSINOPHIL # BLD AUTO: 0.1 K/UL — SIGNIFICANT CHANGE UP (ref 0–0.5)
EOSINOPHIL NFR BLD AUTO: 1.2 % — SIGNIFICANT CHANGE UP (ref 0–6)
HCT VFR BLD CALC: 46 % — HIGH (ref 34.5–45)
HGB BLD-MCNC: 15.1 G/DL — SIGNIFICANT CHANGE UP (ref 11.5–15.5)
IMM GRANULOCYTES NFR BLD AUTO: 0.5 % — SIGNIFICANT CHANGE UP (ref 0–1.5)
LYMPHOCYTES # BLD AUTO: 2.78 K/UL — SIGNIFICANT CHANGE UP (ref 1–3.3)
LYMPHOCYTES # BLD AUTO: 32.3 % — SIGNIFICANT CHANGE UP (ref 13–44)
MCHC RBC-ENTMCNC: 26.7 PG — LOW (ref 27–34)
MCHC RBC-ENTMCNC: 32.8 GM/DL — SIGNIFICANT CHANGE UP (ref 32–36)
MCV RBC AUTO: 81.4 FL — SIGNIFICANT CHANGE UP (ref 80–100)
MONOCYTES # BLD AUTO: 0.71 K/UL — SIGNIFICANT CHANGE UP (ref 0–0.9)
MONOCYTES NFR BLD AUTO: 8.2 % — SIGNIFICANT CHANGE UP (ref 2–14)
NEUTROPHILS # BLD AUTO: 4.91 K/UL — SIGNIFICANT CHANGE UP (ref 1.8–7.4)
NEUTROPHILS NFR BLD AUTO: 57 % — SIGNIFICANT CHANGE UP (ref 43–77)
NRBC # BLD: 0 /100 WBCS — SIGNIFICANT CHANGE UP (ref 0–0)
PLATELET # BLD AUTO: 642 K/UL — HIGH (ref 150–400)
RBC # BLD: 5.65 M/UL — HIGH (ref 3.8–5.2)
RBC # FLD: 18.7 % — HIGH (ref 10.3–14.5)
WBC # BLD: 8.61 K/UL — SIGNIFICANT CHANGE UP (ref 3.8–10.5)
WBC # FLD AUTO: 8.61 K/UL — SIGNIFICANT CHANGE UP (ref 3.8–10.5)

## 2020-02-10 PROCEDURE — 99214 OFFICE O/P EST MOD 30 MIN: CPT

## 2020-02-10 RX ORDER — IBUPROFEN 200 MG/1
TABLET, COATED ORAL
Refills: 0 | Status: DISCONTINUED | COMMUNITY
End: 2020-02-10

## 2020-02-10 NOTE — PHYSICAL EXAM
[de-identified] : looks well [de-identified] : sporadic wheezes [de-identified] : no palpable splenomegaly [Normal] : RRR, normal S1S2, no murmurs, rubs, gallops [de-identified] : no lymphadenopathy [de-identified] : lumpectomy scar right breast, no palpable masses, left breast no masses, no axillary  adenopathy b/l

## 2020-02-10 NOTE — ASSESSMENT
[FreeTextEntry1] : 74 y/o woman with hx of a strongly ER/ME+, HER2-, right sided stage IA breast cancer, s/p lumpectomy 9/2015, and XRT. Did not tolerate adjuvant hormonal therapy.  Clinically - no evidence of recurrence. Mammogram in June ( [facility]- goes yearly).\par \par GINO 2 positive P vera. gets phlebotomies very infrequently but now platelets > 500.\par Will wait one month and repeat- if persistent significant thrombocytosis- she will need to start Hydrea. \par \par Heavy smoker- > 30 pack years  and still smoking. Strongly encouraged smoking cessation.\par Obtain screening low dose CT chest.\par \par RV in one month.

## 2020-02-10 NOTE — ASSESSMENT
[FreeTextEntry1] : 74 y/o woman with hx of a strongly ER/NM+, HER2-, right sided stage IA breast cancer, s/p lumpectomy 9/2015, and XRT. Did not tolerate adjuvant hormonal therapy.  Clinically - no evidence of recurrence. Mammogram in June ( [facility]- goes yearly).\par \par GINO 2 positive P vera. gets phlebotomies very infrequently but now platelets > 500.\par Will wait one month and repeat- if persistent significant thrombocytosis- she will need to start Hydrea. \par \par Heavy smoker- > 30 pack years  and still smoking. Strongly encouraged smoking cessation.\par Obtain screening low dose CT chest.\par \par RV in one month.

## 2020-02-10 NOTE — REASON FOR VISIT
[Family Member] : family member [Follow-Up Visit] : a follow-up [FreeTextEntry2] : JAK2+ PV,  hx of  stage IA breast  cancer

## 2020-02-10 NOTE — HISTORY OF PRESENT ILLNESS
[Disease: _____________________] : Disease: [unfilled] [T: ___] : T[unfilled] [N: ___] : N[unfilled] [M: ___] : M[unfilled] [AJCC Stage: ____] : AJCC Stage: [unfilled] [de-identified] : MEGHAN HEMPHILL is a 74 y.o. who we are following for an ER+, HER2- stage IA breast cancer and now a JAK2+ MPD.\par 5/21/15 - Presented with a 6mm abnormality in the right breast on routine mammo at 6:00. \par 7/29/15 - Biopsy - well differentiated IDC that was ER/[place] strongly positive, HER2 negative.  \par 9/9/15 - Right breast lumpectomy and SLND (DeRisi) for a T1N0 = stage IA breast cancer.\par 11/2015 - Started on Arimidex.  She is s/p adjuvant XRT. She discontinued Arimidex Fall 2016 due to musculoskeletal side effects. \par She did not want to try any other hormonal therapies. \par \par Routine blood work revealed borderline elevated H/H.  \par 7/9/16 - JAK2 mutation was detected. Has had infrequent phlebotomies Comes every 3 months for CBC and possible phleb.  She has been averaging a phlebotomy every other visit ( ~ Q 6 months).  \par Has recently also started to develop a thrombocytosis as well.  [de-identified] : well differentiated infiltrating ductal carcinoma [de-identified] : ER 90%, [place] 90%, HER2 +1 negative [de-identified] : last phlebotomy in May 2017 for Ht 47. \par \par She is still smoking (up to 1 PPD).\par \par Feels well , has no complaints.\par Mammogram - states had in June 2019 - [facility] and told OK ( Rx from PCP).

## 2020-02-10 NOTE — PHYSICAL EXAM
[de-identified] : sporadic wheezes [de-identified] : looks well [de-identified] : no palpable splenomegaly [Normal] : RRR, normal S1S2, no murmurs, rubs, gallops [de-identified] : no lymphadenopathy [de-identified] : lumpectomy scar right breast, no palpable masses, left breast no masses, no axillary  adenopathy b/l

## 2020-02-10 NOTE — HISTORY OF PRESENT ILLNESS
[Disease: _____________________] : Disease: [unfilled] [T: ___] : T[unfilled] [N: ___] : N[unfilled] [AJCC Stage: ____] : AJCC Stage: [unfilled] [M: ___] : M[unfilled] [de-identified] : MEGHAN HEMPHILL is a 74 y.o. who we are following for an ER+, HER2- stage IA breast cancer and now a JAK2+ MPD.\par 5/21/15 - Presented with a 6mm abnormality in the right breast on routine mammo at 6:00. \par 7/29/15 - Biopsy - well differentiated IDC that was ER/[place] strongly positive, HER2 negative.  \par 9/9/15 - Right breast lumpectomy and SLND (DeRisi) for a T1N0 = stage IA breast cancer.\par 11/2015 - Started on Arimidex.  She is s/p adjuvant XRT. She discontinued Arimidex Fall 2016 due to musculoskeletal side effects. \par She did not want to try any other hormonal therapies. \par \par Routine blood work revealed borderline elevated H/H.  \par 7/9/16 - JAK2 mutation was detected. Has had infrequent phlebotomies Comes every 3 months for CBC and possible phleb.  She has been averaging a phlebotomy every other visit ( ~ Q 6 months).  \par Has recently also started to develop a thrombocytosis as well.  [de-identified] : well differentiated infiltrating ductal carcinoma [de-identified] : ER 90%, [place] 90%, HER2 +1 negative [de-identified] : last phlebotomy in May 2017 for Ht 47. \par \par She is still smoking (up to 1 PPD).\par \par Feels well , has no complaints.\par Mammogram - states had in June 2019 - [facility] and told OK ( Rx from PCP).

## 2020-02-10 NOTE — REVIEW OF SYSTEMS
[Shortness Of Breath] : shortness of breath [FreeTextEntry6] : per HPI  [Patient Intake Form Reviewed] : Patient intake form was reviewed [Negative] : Allergic/Immunologic

## 2020-02-11 DIAGNOSIS — C50.911 MALIGNANT NEOPLASM OF UNSPECIFIED SITE OF RIGHT FEMALE BREAST: ICD-10-CM

## 2020-02-26 ENCOUNTER — TRANSCRIPTION ENCOUNTER (OUTPATIENT)
Age: 75
End: 2020-02-26

## 2020-02-27 ENCOUNTER — OUTPATIENT (OUTPATIENT)
Dept: OUTPATIENT SERVICES | Facility: HOSPITAL | Age: 75
LOS: 1 days | End: 2020-02-27
Payer: COMMERCIAL

## 2020-02-27 ENCOUNTER — RESULT REVIEW (OUTPATIENT)
Age: 75
End: 2020-02-27

## 2020-02-27 DIAGNOSIS — Z86.010 PERSONAL HISTORY OF COLONIC POLYPS: ICD-10-CM

## 2020-02-27 DIAGNOSIS — Z90.710 ACQUIRED ABSENCE OF BOTH CERVIX AND UTERUS: Chronic | ICD-10-CM

## 2020-02-27 PROCEDURE — 88305 TISSUE EXAM BY PATHOLOGIST: CPT

## 2020-02-27 PROCEDURE — 45385 COLONOSCOPY W/LESION REMOVAL: CPT

## 2020-02-27 PROCEDURE — C1889: CPT

## 2020-02-27 PROCEDURE — 88305 TISSUE EXAM BY PATHOLOGIST: CPT | Mod: 26

## 2020-02-28 LAB — SURGICAL PATHOLOGY STUDY: SIGNIFICANT CHANGE UP

## 2020-03-04 ENCOUNTER — OUTPATIENT (OUTPATIENT)
Dept: OUTPATIENT SERVICES | Facility: HOSPITAL | Age: 75
LOS: 1 days | Discharge: HOME | End: 2020-03-04

## 2020-03-04 DIAGNOSIS — Z90.710 ACQUIRED ABSENCE OF BOTH CERVIX AND UTERUS: Chronic | ICD-10-CM

## 2020-03-04 DIAGNOSIS — D45 POLYCYTHEMIA VERA: ICD-10-CM

## 2020-03-04 DIAGNOSIS — C50.911 MALIGNANT NEOPLASM OF UNSPECIFIED SITE OF RIGHT FEMALE BREAST: ICD-10-CM

## 2020-03-10 ENCOUNTER — RESULT REVIEW (OUTPATIENT)
Age: 75
End: 2020-03-10

## 2020-03-10 ENCOUNTER — APPOINTMENT (OUTPATIENT)
Dept: HEMATOLOGY ONCOLOGY | Facility: CLINIC | Age: 75
End: 2020-03-10
Payer: MEDICARE

## 2020-03-10 VITALS
HEIGHT: 64 IN | SYSTOLIC BLOOD PRESSURE: 130 MMHG | RESPIRATION RATE: 18 BRPM | HEART RATE: 73 BPM | DIASTOLIC BLOOD PRESSURE: 84 MMHG | BODY MASS INDEX: 24.41 KG/M2 | TEMPERATURE: 98.4 F | WEIGHT: 143 LBS

## 2020-03-10 LAB
BASOPHILS # BLD AUTO: 0.09 K/UL — SIGNIFICANT CHANGE UP (ref 0–0.2)
BASOPHILS NFR BLD AUTO: 1.1 % — SIGNIFICANT CHANGE UP (ref 0–2)
EOSINOPHIL # BLD AUTO: 0.09 K/UL — SIGNIFICANT CHANGE UP (ref 0–0.5)
EOSINOPHIL NFR BLD AUTO: 1.1 % — SIGNIFICANT CHANGE UP (ref 0–6)
HCT VFR BLD CALC: 45.7 % — HIGH (ref 34.5–45)
HGB BLD-MCNC: 15 G/DL — SIGNIFICANT CHANGE UP (ref 11.5–15.5)
IMM GRANULOCYTES NFR BLD AUTO: 0.4 % — SIGNIFICANT CHANGE UP (ref 0–1.5)
LYMPHOCYTES # BLD AUTO: 2.89 K/UL — SIGNIFICANT CHANGE UP (ref 1–3.3)
LYMPHOCYTES # BLD AUTO: 35.2 % — SIGNIFICANT CHANGE UP (ref 13–44)
MCHC RBC-ENTMCNC: 27.1 PG — SIGNIFICANT CHANGE UP (ref 27–34)
MCHC RBC-ENTMCNC: 32.8 GM/DL — SIGNIFICANT CHANGE UP (ref 32–36)
MCV RBC AUTO: 82.6 FL — SIGNIFICANT CHANGE UP (ref 80–100)
MONOCYTES # BLD AUTO: 0.77 K/UL — SIGNIFICANT CHANGE UP (ref 0–0.9)
MONOCYTES NFR BLD AUTO: 9.4 % — SIGNIFICANT CHANGE UP (ref 2–14)
NEUTROPHILS # BLD AUTO: 4.35 K/UL — SIGNIFICANT CHANGE UP (ref 1.8–7.4)
NEUTROPHILS NFR BLD AUTO: 52.8 % — SIGNIFICANT CHANGE UP (ref 43–77)
NRBC # BLD: 0 /100 WBCS — SIGNIFICANT CHANGE UP (ref 0–0)
PLATELET # BLD AUTO: 577 K/UL — HIGH (ref 150–400)
RBC # BLD: 5.53 M/UL — HIGH (ref 3.8–5.2)
RBC # FLD: 18.3 % — HIGH (ref 10.3–14.5)
WBC # BLD: 8.22 K/UL — SIGNIFICANT CHANGE UP (ref 3.8–10.5)
WBC # FLD AUTO: 8.22 K/UL — SIGNIFICANT CHANGE UP (ref 3.8–10.5)

## 2020-03-10 PROCEDURE — 99214 OFFICE O/P EST MOD 30 MIN: CPT

## 2020-03-10 NOTE — HISTORY OF PRESENT ILLNESS
[Disease: _____________________] : Disease: [unfilled] [T: ___] : T[unfilled] [N: ___] : N[unfilled] [M: ___] : M[unfilled] [AJCC Stage: ____] : AJCC Stage: [unfilled] [de-identified] : MEGHAN HEMPHILL is a 74 y.o. who we are following for an ER+, HER2- stage IA breast cancer and now a JAK2+ MPD.\par 5/21/15 - Presented with a 6mm abnormality in the right breast on routine mammo at 6:00. \par 7/29/15 - Biopsy - well differentiated IDC that was ER/[place] strongly positive, HER2 negative.  \par 9/9/15 - Right breast lumpectomy and SLND (DeRisi) for a T1N0 = stage IA breast cancer.\par 11/2015 - Started on Arimidex.  She is s/p adjuvant XRT. She discontinued Arimidex Fall 2016 due to musculoskeletal side effects. \par She did not want to try any other hormonal therapies. \par \par Routine blood work revealed borderline elevated H/H.  \par 7/9/16 - JAK2 mutation was detected. Has had infrequent phlebotomies Comes every 3 months for CBC and possible phleb.  She has been averaging a phlebotomy every other visit ( ~ Q 6 months).  \par Has recently also started to develop a thrombocytosis as well.  [de-identified] : well differentiated infiltrating ductal carcinoma [de-identified] : ER 90%, [place] 90%, HER2 +1 negative [de-identified] : last phlebotomy in May 2017 for Ht 47. \par \par She is still smoking (up to 1 PPD).\par \par Feels well , has no complaints.\par Mammogram - states had in June 2019 - [facility] and told OK ( Rx from PCP).

## 2020-03-10 NOTE — HISTORY OF PRESENT ILLNESS
[Disease: _____________________] : Disease: [unfilled] [T: ___] : T[unfilled] [N: ___] : N[unfilled] [M: ___] : M[unfilled] [AJCC Stage: ____] : AJCC Stage: [unfilled] [de-identified] : MEGHAN HEMPHILL is a 74 y.o. who we are following for an ER+, HER2- stage IA breast cancer and now a JAK2+ MPD.\par 5/21/15 - Presented with a 6mm abnormality in the right breast on routine mammo at 6:00. \par 7/29/15 - Biopsy - well differentiated IDC that was ER/[place] strongly positive, HER2 negative.  \par 9/9/15 - Right breast lumpectomy and SLND (DeRisi) for a T1N0 = stage IA breast cancer.\par 11/2015 - Started on Arimidex.  She is s/p adjuvant XRT. She discontinued Arimidex Fall 2016 due to musculoskeletal side effects. \par She did not want to try any other hormonal therapies. \par \par Routine blood work revealed borderline elevated H/H.  \par 7/9/16 - JAK2 mutation was detected. Has had infrequent phlebotomies Comes every 3 months for CBC and possible phleb.  She has been averaging a phlebotomy every other visit ( ~ Q 6 months).  \par Has recently also started to develop a thrombocytosis as well.  [de-identified] : well differentiated infiltrating ductal carcinoma [de-identified] : ER 90%, [place] 90%, HER2 +1 negative [de-identified] : Feels OK. No new complaints.\par She is helping her daughter diagnosed with breast  cancer- being treated/ chemotherapy. \par She is still smoking (up to 1 PPD).  Screening CT chest  2/22/20 few subcentimeter pulmonary nodules no change compared  to  May 2018 \par \par Mammogram - states had in June 2019 - [facility] and told OK ( Rx from PCP).

## 2020-03-10 NOTE — ASSESSMENT
[FreeTextEntry1] : 74 y/o woman with hx of a strongly ER/[place]+, HER2-, right sided stage IA breast cancer, s/p lumpectomy 9/2015, and XRT. Did not tolerate adjuvant hormonal therapy.  Clinically - no evidence of recurrence. Mammogram in June ( [facility]- goes yearly).\par \par GINO 2 positive P vera. gets phlebotomies very infrequently but now platelets > 500.\par Will wait one month and repeat- if persistent significant thrombocytosis- she will need to start Hydrea. \par \par Heavy smoker- > 30 pack years  and still smoking. Strongly encouraged smoking cessation.\par Obtain screening low dose CT chest.\par \par RV in one month.

## 2020-03-10 NOTE — PHYSICAL EXAM
[Normal] : affect appropriate [de-identified] : no lymphadenopathy [de-identified] : lumpectomy scar right breast, no palpable masses, left breast no masses, no axillary  adenopathy b/l

## 2020-03-10 NOTE — ASSESSMENT
[FreeTextEntry1] : 74 y/o woman with hx of a strongly ER/AK+, HER2-, right sided stage IA breast cancer, s/p lumpectomy 9/2015, and XRT. Did not tolerate adjuvant hormonal therapy.  Clinically - no evidence of recurrence. Mammogram in June 2020 Rx given  ( [facility]- goes yearly).\par \par GINO 2 positive P vera. gets phlebotomies very infrequently , recently platelets > 500. \par Discussed  Hydrea- recommend to start because platelets  persistently in 550- 650 range. Explained that Hydrea is usually well tolerated, needs monitoring, intolerance infrequent.\par Patient is very nervous about starting any new medications. She is very involved in taking care of her daughter and wants to postpone decision until next appointment.\par No need for phlebotomy now.\par \par Return visit in 3 months CBC/ phleb  as needed, exam in 6 months.\par \par Heavy smoker- > 30 pack years  and still smoking. Strongly encouraged smoking cessation.\par Screening low dose CT chest- pulmonary nodule stable. repeat CT in one year.\par \par RV in one month.

## 2020-03-10 NOTE — PHYSICAL EXAM
[Normal] : affect appropriate [de-identified] : no lymphadenopathy [de-identified] : lumpectomy scar right breast, no palpable masses, left breast no masses, no axillary  adenopathy b/l

## 2020-06-05 ENCOUNTER — OUTPATIENT (OUTPATIENT)
Dept: OUTPATIENT SERVICES | Facility: HOSPITAL | Age: 75
LOS: 1 days | Discharge: HOME | End: 2020-06-05

## 2020-06-05 DIAGNOSIS — D45 POLYCYTHEMIA VERA: ICD-10-CM

## 2020-06-05 DIAGNOSIS — C50.911 MALIGNANT NEOPLASM OF UNSPECIFIED SITE OF RIGHT FEMALE BREAST: ICD-10-CM

## 2020-06-05 DIAGNOSIS — Z90.710 ACQUIRED ABSENCE OF BOTH CERVIX AND UTERUS: Chronic | ICD-10-CM

## 2020-06-11 ENCOUNTER — APPOINTMENT (OUTPATIENT)
Dept: INFUSION THERAPY | Facility: CLINIC | Age: 75
End: 2020-06-11

## 2020-06-14 ENCOUNTER — OUTPATIENT (OUTPATIENT)
Dept: OUTPATIENT SERVICES | Facility: HOSPITAL | Age: 75
LOS: 1 days | End: 2020-06-14
Payer: COMMERCIAL

## 2020-06-14 DIAGNOSIS — Z90.710 ACQUIRED ABSENCE OF BOTH CERVIX AND UTERUS: Chronic | ICD-10-CM

## 2020-06-14 DIAGNOSIS — Z11.59 ENCOUNTER FOR SCREENING FOR OTHER VIRAL DISEASES: ICD-10-CM

## 2020-06-14 LAB — SARS-COV-2 RNA SPEC QL NAA+PROBE: SIGNIFICANT CHANGE UP

## 2020-06-14 PROCEDURE — U0003: CPT

## 2020-06-16 ENCOUNTER — OUTPATIENT (OUTPATIENT)
Dept: OUTPATIENT SERVICES | Facility: HOSPITAL | Age: 75
LOS: 1 days | End: 2020-06-16
Payer: COMMERCIAL

## 2020-06-16 DIAGNOSIS — Z90.710 ACQUIRED ABSENCE OF BOTH CERVIX AND UTERUS: Chronic | ICD-10-CM

## 2020-06-16 DIAGNOSIS — M54.16 RADICULOPATHY, LUMBAR REGION: ICD-10-CM

## 2020-06-16 PROCEDURE — 77003 FLUOROGUIDE FOR SPINE INJECT: CPT

## 2020-06-16 PROCEDURE — 62323 NJX INTERLAMINAR LMBR/SAC: CPT

## 2020-08-31 ENCOUNTER — OUTPATIENT (OUTPATIENT)
Dept: OUTPATIENT SERVICES | Facility: HOSPITAL | Age: 75
LOS: 1 days | Discharge: HOME | End: 2020-08-31

## 2020-08-31 DIAGNOSIS — D45 POLYCYTHEMIA VERA: ICD-10-CM

## 2020-08-31 DIAGNOSIS — Z90.710 ACQUIRED ABSENCE OF BOTH CERVIX AND UTERUS: Chronic | ICD-10-CM

## 2020-08-31 DIAGNOSIS — C50.911 MALIGNANT NEOPLASM OF UNSPECIFIED SITE OF RIGHT FEMALE BREAST: ICD-10-CM

## 2020-09-08 ENCOUNTER — RESULT REVIEW (OUTPATIENT)
Age: 75
End: 2020-09-08

## 2020-09-08 ENCOUNTER — APPOINTMENT (OUTPATIENT)
Dept: HEMATOLOGY ONCOLOGY | Facility: CLINIC | Age: 75
End: 2020-09-08
Payer: MEDICARE

## 2020-09-08 VITALS
TEMPERATURE: 94.1 F | WEIGHT: 137 LBS | SYSTOLIC BLOOD PRESSURE: 129 MMHG | DIASTOLIC BLOOD PRESSURE: 77 MMHG | BODY MASS INDEX: 23.52 KG/M2 | HEART RATE: 83 BPM

## 2020-09-08 PROCEDURE — 99214 OFFICE O/P EST MOD 30 MIN: CPT

## 2020-09-08 RX ORDER — AMLODIPINE BESYLATE 2.5 MG/1
2.5 TABLET ORAL DAILY
Refills: 0 | Status: ACTIVE | COMMUNITY

## 2020-09-08 RX ORDER — OLANZAPINE 15 MG/1
15 TABLET, FILM COATED ORAL DAILY
Refills: 0 | Status: ACTIVE | COMMUNITY

## 2020-09-08 RX ORDER — TRAZODONE HYDROCHLORIDE 50 MG/1
50 TABLET ORAL
Refills: 0 | Status: ACTIVE | COMMUNITY

## 2020-09-08 NOTE — REVIEW OF SYSTEMS
[Patient Intake Form Reviewed] : Patient intake form was reviewed [Negative] : Endocrine [Recent Change In Weight] : ~T recent weight change

## 2020-09-08 NOTE — ASSESSMENT
[FreeTextEntry1] : 74 y/o woman with hx of a strongly ER/ID+, HER2-, right sided stage IA breast cancer, s/p lumpectomy 9/2015, and XRT. Did not tolerate adjuvant hormonal therapy.  Clinically - no evidence of recurrence. Mammogram in June 2021 ( [facility]- goes yearly).\par \par GINO 2 positive P vera. Managed with infrequent  phlebotomies.\par Developed elevated platelets but refused to try Hydrea previously.\par Discussed again. explained side effects of Hydrea. She is willing to try it.\par \par Rx Hydrea 500 mg daily.\par Return visit in 2 weeks for CBC/ Hydrea dose adjustment/ possible phlebotomy if needed.\par Continue ASA.\par \par Pulmonary nodule- repeat CT yearly. Encouraged smoking cessation.

## 2020-09-08 NOTE — HISTORY OF PRESENT ILLNESS
[T: ___] : T[unfilled] [Disease: _____________________] : Disease: [unfilled] [N: ___] : N[unfilled] [M: ___] : M[unfilled] [AJCC Stage: ____] : AJCC Stage: [unfilled] [de-identified] : MEGHAN HEMPHILL is a 74 y.o. who we are following for an ER+, HER2- stage IA breast cancer and now a JAK2+ MPD.\par 5/21/15 - Presented with a 6mm abnormality in the right breast on routine mammo at 6:00. \par 7/29/15 - Biopsy - well differentiated IDC that was ER/MA strongly positive, HER2 negative.  \par 9/9/15 - Right breast lumpectomy and SLND (Primitivoi) for a T1N0 = stage IA breast cancer.\par 11/2015 - Started on Arimidex.  She is s/p adjuvant XRT. She discontinued Arimidex Fall 2016 due to musculoskeletal side effects. \par She did not want to try any other hormonal therapies. \par \par Routine blood work revealed borderline elevated H/H.  \par 7/9/16 - JAK2 mutation was detected. Has had infrequent phlebotomies. developed thrombocytosis but did not want to take Hydrea. [de-identified] : ER 90%, AR 90%, HER2 +1 negative [de-identified] : well differentiated infiltrating ductal carcinoma [de-identified] : Feels OK. mammogram June 27, 2020- OK ( [facility]- had Rx form PCP). No complaints. Lost few lbs but contributes to stress due to her daughter illness. \par Daughter recently passed away from metastatic breast cancer.

## 2020-09-08 NOTE — PHYSICAL EXAM
[Normal] : grossly intact [de-identified] : no lymphadenopathy [de-identified] : periareolar lumpectomy scar right breast, no palpable masses, left breast no masses, no axillary  adenopathy b/l

## 2020-09-22 ENCOUNTER — RESULT REVIEW (OUTPATIENT)
Age: 75
End: 2020-09-22

## 2020-09-22 ENCOUNTER — APPOINTMENT (OUTPATIENT)
Dept: INFUSION THERAPY | Facility: CLINIC | Age: 75
End: 2020-09-22

## 2020-09-22 VITALS
HEART RATE: 92 BPM | DIASTOLIC BLOOD PRESSURE: 77 MMHG | TEMPERATURE: 97.6 F | WEIGHT: 139.1 LBS | SYSTOLIC BLOOD PRESSURE: 116 MMHG | BODY MASS INDEX: 23.88 KG/M2

## 2020-10-16 ENCOUNTER — OUTPATIENT (OUTPATIENT)
Dept: OUTPATIENT SERVICES | Facility: HOSPITAL | Age: 75
LOS: 1 days | Discharge: HOME | End: 2020-10-16

## 2020-10-16 DIAGNOSIS — Z90.710 ACQUIRED ABSENCE OF BOTH CERVIX AND UTERUS: Chronic | ICD-10-CM

## 2020-10-16 DIAGNOSIS — D45 POLYCYTHEMIA VERA: ICD-10-CM

## 2020-10-20 ENCOUNTER — APPOINTMENT (OUTPATIENT)
Dept: INFUSION THERAPY | Facility: CLINIC | Age: 75
End: 2020-10-20

## 2020-10-20 ENCOUNTER — RESULT REVIEW (OUTPATIENT)
Age: 75
End: 2020-10-20

## 2020-10-20 VITALS
WEIGHT: 137 LBS | RESPIRATION RATE: 16 BRPM | BODY MASS INDEX: 23.39 KG/M2 | TEMPERATURE: 98.2 F | SYSTOLIC BLOOD PRESSURE: 134 MMHG | HEIGHT: 64 IN | DIASTOLIC BLOOD PRESSURE: 81 MMHG | HEART RATE: 85 BPM

## 2020-12-17 ENCOUNTER — OUTPATIENT (OUTPATIENT)
Dept: OUTPATIENT SERVICES | Facility: HOSPITAL | Age: 75
LOS: 1 days | Discharge: HOME | End: 2020-12-17

## 2020-12-17 DIAGNOSIS — D45 POLYCYTHEMIA VERA: ICD-10-CM

## 2020-12-17 DIAGNOSIS — C50.911 MALIGNANT NEOPLASM OF UNSPECIFIED SITE OF RIGHT FEMALE BREAST: ICD-10-CM

## 2020-12-17 DIAGNOSIS — Z90.710 ACQUIRED ABSENCE OF BOTH CERVIX AND UTERUS: Chronic | ICD-10-CM

## 2020-12-18 ENCOUNTER — RESULT REVIEW (OUTPATIENT)
Age: 75
End: 2020-12-18

## 2020-12-18 ENCOUNTER — APPOINTMENT (OUTPATIENT)
Dept: HEMATOLOGY ONCOLOGY | Facility: CLINIC | Age: 75
End: 2020-12-18
Payer: MEDICARE

## 2020-12-18 LAB
BASOPHILS # BLD AUTO: 0.06 K/UL — SIGNIFICANT CHANGE UP (ref 0–0.2)
BASOPHILS NFR BLD AUTO: 1.1 % — SIGNIFICANT CHANGE UP (ref 0–2)
EOSINOPHIL # BLD AUTO: 0.05 K/UL — SIGNIFICANT CHANGE UP (ref 0–0.5)
EOSINOPHIL NFR BLD AUTO: 0.9 % — SIGNIFICANT CHANGE UP (ref 0–6)
HCT VFR BLD CALC: 43.2 % — SIGNIFICANT CHANGE UP (ref 34.5–45)
HGB BLD-MCNC: 14.4 G/DL — SIGNIFICANT CHANGE UP (ref 11.5–15.5)
IMM GRANULOCYTES NFR BLD AUTO: 0.2 % — SIGNIFICANT CHANGE UP (ref 0–1.5)
LYMPHOCYTES # BLD AUTO: 1.44 K/UL — SIGNIFICANT CHANGE UP (ref 1–3.3)
LYMPHOCYTES # BLD AUTO: 27.3 % — SIGNIFICANT CHANGE UP (ref 13–44)
MCHC RBC-ENTMCNC: 31.3 PG — SIGNIFICANT CHANGE UP (ref 27–34)
MCHC RBC-ENTMCNC: 33.3 GM/DL — SIGNIFICANT CHANGE UP (ref 32–36)
MCV RBC AUTO: 93.9 FL — SIGNIFICANT CHANGE UP (ref 80–100)
MONOCYTES # BLD AUTO: 0.51 K/UL — SIGNIFICANT CHANGE UP (ref 0–0.9)
MONOCYTES NFR BLD AUTO: 9.7 % — SIGNIFICANT CHANGE UP (ref 2–14)
NEUTROPHILS # BLD AUTO: 3.21 K/UL — SIGNIFICANT CHANGE UP (ref 1.8–7.4)
NEUTROPHILS NFR BLD AUTO: 60.8 % — SIGNIFICANT CHANGE UP (ref 43–77)
NRBC # BLD: 0 /100 WBCS — SIGNIFICANT CHANGE UP (ref 0–0)
PLATELET # BLD AUTO: 307 K/UL — SIGNIFICANT CHANGE UP (ref 150–400)
RBC # BLD: 4.6 M/UL — SIGNIFICANT CHANGE UP (ref 3.8–5.2)
RBC # FLD: 17.5 % — HIGH (ref 10.3–14.5)
WBC # BLD: 5.28 K/UL — SIGNIFICANT CHANGE UP (ref 3.8–10.5)
WBC # FLD AUTO: 5.28 K/UL — SIGNIFICANT CHANGE UP (ref 3.8–10.5)

## 2020-12-18 PROCEDURE — 99499A: CUSTOM | Mod: NC

## 2020-12-22 ENCOUNTER — APPOINTMENT (OUTPATIENT)
Dept: INFUSION THERAPY | Facility: CLINIC | Age: 75
End: 2020-12-22

## 2021-02-11 ENCOUNTER — OUTPATIENT (OUTPATIENT)
Dept: OUTPATIENT SERVICES | Facility: HOSPITAL | Age: 76
LOS: 1 days | Discharge: HOME | End: 2021-02-11

## 2021-02-11 DIAGNOSIS — D45 POLYCYTHEMIA VERA: ICD-10-CM

## 2021-02-11 DIAGNOSIS — Z90.710 ACQUIRED ABSENCE OF BOTH CERVIX AND UTERUS: Chronic | ICD-10-CM

## 2021-02-11 DIAGNOSIS — C50.911 MALIGNANT NEOPLASM OF UNSPECIFIED SITE OF RIGHT FEMALE BREAST: ICD-10-CM

## 2021-02-19 ENCOUNTER — RESULT REVIEW (OUTPATIENT)
Age: 76
End: 2021-02-19

## 2021-02-19 ENCOUNTER — APPOINTMENT (OUTPATIENT)
Dept: HEMATOLOGY ONCOLOGY | Facility: CLINIC | Age: 76
End: 2021-02-19
Payer: MEDICARE

## 2021-02-19 VITALS
DIASTOLIC BLOOD PRESSURE: 75 MMHG | HEART RATE: 88 BPM | TEMPERATURE: 97.7 F | SYSTOLIC BLOOD PRESSURE: 116 MMHG | BODY MASS INDEX: 24.36 KG/M2 | WEIGHT: 141.9 LBS

## 2021-02-19 PROCEDURE — 99214 OFFICE O/P EST MOD 30 MIN: CPT

## 2021-02-19 NOTE — HISTORY OF PRESENT ILLNESS
[Disease: _____________________] : Disease: [unfilled] [T: ___] : T[unfilled] [N: ___] : N[unfilled] [M: ___] : M[unfilled] [AJCC Stage: ____] : AJCC Stage: [unfilled] [de-identified] : MEGHAN HEMPHILL is a 74 y.o. who we are following for an ER+, HER2- stage IA breast cancer and now a JAK2+ MPD.\par 5/21/15 - Presented with a 6mm abnormality in the right breast on routine mammo at 6:00. \par 7/29/15 - Biopsy - well differentiated IDC that was ER/RI strongly positive, HER2 negative.  \par 9/9/15 - Right breast lumpectomy and SLND (Primitivoi) for a T1N0 = stage IA breast cancer.\par 11/2015 - Started on Arimidex.  She is s/p adjuvant XRT. She discontinued Arimidex Fall 2016 due to musculoskeletal side effects. \par She did not want to try any other hormonal therapies. \par \par Routine blood work revealed borderline elevated H/H.  \par 7/9/16 - JAK2 mutation was detected.  s/p  phlebotomies.Thrombocytosis.\par \par Started Hydrea Sept 2020. On 500 mg daily. [de-identified] : well differentiated infiltrating ductal carcinoma [de-identified] : ER 90%, AL 90%, HER2 +1 negative [de-identified] : Feels OK. mammogram June 27, 2020- OK ( [facility]) No complaints. \par Tolerating Hydrea well ( on 500 mg daily)

## 2021-02-19 NOTE — REVIEW OF SYSTEMS
[Patient Intake Form Reviewed] : Patient intake form was reviewed [Recent Change In Weight] : ~T recent weight change [Negative] : Heme/Lymph

## 2021-02-19 NOTE — PHYSICAL EXAM
[Normal] : affect appropriate [de-identified] : no lymphadenopathy [de-identified] : periareolar lumpectomy scar right breast, no palpable masses, left breast no masses, no axillary  adenopathy b/l

## 2021-02-19 NOTE — ASSESSMENT
[FreeTextEntry1] : 75 y/o woman with hx of a strongly ER/WY+, HER2-, right sided stage IA breast cancer, s/p lumpectomy 9/2015, and XRT. Did not tolerate adjuvant hormonal therapy.  Clinically - no evidence of recurrence. Mammogram in June 2021 ( [facility]- goes yearly).\par \par GINO 2 positive P vera. Continue Hydrea 500 mg daily. Counts well controlled.\par \par Continue ASA.\par \par Pulmonary nodule- repeat CT yearly. Encouraged smoking cessation. \par \par \par CBC in 3 months.\par Exam in 6 months .
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
## 2021-05-19 ENCOUNTER — OUTPATIENT (OUTPATIENT)
Dept: OUTPATIENT SERVICES | Facility: HOSPITAL | Age: 76
LOS: 1 days | Discharge: HOME | End: 2021-05-19

## 2021-05-19 DIAGNOSIS — D45 POLYCYTHEMIA VERA: ICD-10-CM

## 2021-05-19 DIAGNOSIS — Z90.710 ACQUIRED ABSENCE OF BOTH CERVIX AND UTERUS: Chronic | ICD-10-CM

## 2021-05-21 ENCOUNTER — APPOINTMENT (OUTPATIENT)
Dept: HEMATOLOGY ONCOLOGY | Facility: CLINIC | Age: 76
End: 2021-05-21

## 2021-05-21 ENCOUNTER — RESULT REVIEW (OUTPATIENT)
Age: 76
End: 2021-05-21

## 2021-05-21 LAB
BASOPHILS # BLD AUTO: 0.05 K/UL — SIGNIFICANT CHANGE UP (ref 0–0.2)
BASOPHILS NFR BLD AUTO: 1 % — SIGNIFICANT CHANGE UP (ref 0–2)
EOSINOPHIL # BLD AUTO: 0.05 K/UL — SIGNIFICANT CHANGE UP (ref 0–0.5)
EOSINOPHIL NFR BLD AUTO: 1 % — SIGNIFICANT CHANGE UP (ref 0–6)
HCT VFR BLD CALC: 44.3 % — SIGNIFICANT CHANGE UP (ref 34.5–45)
HGB BLD-MCNC: 15.1 G/DL — SIGNIFICANT CHANGE UP (ref 11.5–15.5)
IMM GRANULOCYTES NFR BLD AUTO: 0 % — SIGNIFICANT CHANGE UP (ref 0–1.5)
LYMPHOCYTES # BLD AUTO: 1.88 K/UL — SIGNIFICANT CHANGE UP (ref 1–3.3)
LYMPHOCYTES # BLD AUTO: 38 % — SIGNIFICANT CHANGE UP (ref 13–44)
MCHC RBC-ENTMCNC: 34.1 GM/DL — SIGNIFICANT CHANGE UP (ref 32–36)
MCHC RBC-ENTMCNC: 35.7 PG — HIGH (ref 27–34)
MCV RBC AUTO: 104.7 FL — HIGH (ref 80–100)
MONOCYTES # BLD AUTO: 0.53 K/UL — SIGNIFICANT CHANGE UP (ref 0–0.9)
MONOCYTES NFR BLD AUTO: 10.7 % — SIGNIFICANT CHANGE UP (ref 2–14)
NEUTROPHILS # BLD AUTO: 2.44 K/UL — SIGNIFICANT CHANGE UP (ref 1.8–7.4)
NEUTROPHILS NFR BLD AUTO: 49.3 % — SIGNIFICANT CHANGE UP (ref 43–77)
NRBC # BLD: 0 /100 WBCS — SIGNIFICANT CHANGE UP (ref 0–0)
PLATELET # BLD AUTO: 302 K/UL — SIGNIFICANT CHANGE UP (ref 150–400)
RBC # BLD: 4.23 M/UL — SIGNIFICANT CHANGE UP (ref 3.8–5.2)
RBC # FLD: 14.6 % — HIGH (ref 10.3–14.5)
WBC # BLD: 4.95 K/UL — SIGNIFICANT CHANGE UP (ref 3.8–10.5)
WBC # FLD AUTO: 4.95 K/UL — SIGNIFICANT CHANGE UP (ref 3.8–10.5)

## 2021-06-14 ENCOUNTER — OUTPATIENT (OUTPATIENT)
Dept: OUTPATIENT SERVICES | Facility: HOSPITAL | Age: 76
LOS: 1 days | End: 2021-06-14
Payer: COMMERCIAL

## 2021-06-14 DIAGNOSIS — M54.12 RADICULOPATHY, CERVICAL REGION: ICD-10-CM

## 2021-06-14 DIAGNOSIS — Z90.710 ACQUIRED ABSENCE OF BOTH CERVIX AND UTERUS: Chronic | ICD-10-CM

## 2021-06-14 PROCEDURE — 62323 NJX INTERLAMINAR LMBR/SAC: CPT

## 2021-09-06 ENCOUNTER — NON-APPOINTMENT (OUTPATIENT)
Age: 76
End: 2021-09-06

## 2021-09-08 ENCOUNTER — OUTPATIENT (OUTPATIENT)
Dept: OUTPATIENT SERVICES | Facility: HOSPITAL | Age: 76
LOS: 1 days | Discharge: HOME | End: 2021-09-08

## 2021-09-08 DIAGNOSIS — Z90.710 ACQUIRED ABSENCE OF BOTH CERVIX AND UTERUS: Chronic | ICD-10-CM

## 2021-09-08 DIAGNOSIS — D45 POLYCYTHEMIA VERA: ICD-10-CM

## 2021-09-10 ENCOUNTER — APPOINTMENT (OUTPATIENT)
Dept: HEMATOLOGY ONCOLOGY | Facility: CLINIC | Age: 76
End: 2021-09-10

## 2021-09-13 ENCOUNTER — APPOINTMENT (OUTPATIENT)
Dept: ORTHOPEDIC SURGERY | Facility: CLINIC | Age: 76
End: 2021-09-13
Payer: MEDICARE

## 2021-09-13 VITALS
HEART RATE: 88 BPM | HEIGHT: 64 IN | BODY MASS INDEX: 24.75 KG/M2 | WEIGHT: 145 LBS | SYSTOLIC BLOOD PRESSURE: 153 MMHG | DIASTOLIC BLOOD PRESSURE: 79 MMHG

## 2021-09-13 DIAGNOSIS — M54.16 RADICULOPATHY, LUMBAR REGION: ICD-10-CM

## 2021-09-13 PROCEDURE — 72100 X-RAY EXAM L-S SPINE 2/3 VWS: CPT

## 2021-09-13 PROCEDURE — 99204 OFFICE O/P NEW MOD 45 MIN: CPT

## 2021-09-22 ENCOUNTER — APPOINTMENT (OUTPATIENT)
Dept: ORTHOPEDIC SURGERY | Facility: CLINIC | Age: 76
End: 2021-09-22
Payer: MEDICARE

## 2021-09-22 PROCEDURE — 99442: CPT

## 2021-09-27 ENCOUNTER — APPOINTMENT (OUTPATIENT)
Dept: ORTHOPEDIC SURGERY | Facility: CLINIC | Age: 76
End: 2021-09-27
Payer: MEDICARE

## 2021-09-27 VITALS — WEIGHT: 145 LBS | BODY MASS INDEX: 24.75 KG/M2 | HEIGHT: 64 IN

## 2021-09-27 DIAGNOSIS — M54.5 LOW BACK PAIN: ICD-10-CM

## 2021-09-27 PROCEDURE — 99214 OFFICE O/P EST MOD 30 MIN: CPT

## 2021-09-28 ENCOUNTER — APPOINTMENT (OUTPATIENT)
Dept: ORTHOPEDIC SURGERY | Facility: CLINIC | Age: 76
End: 2021-09-28

## 2021-09-29 ENCOUNTER — NON-APPOINTMENT (OUTPATIENT)
Age: 76
End: 2021-09-29

## 2021-10-18 ENCOUNTER — OUTPATIENT (OUTPATIENT)
Dept: OUTPATIENT SERVICES | Facility: HOSPITAL | Age: 76
LOS: 1 days | Discharge: HOME | End: 2021-10-18

## 2021-10-18 DIAGNOSIS — Z90.710 ACQUIRED ABSENCE OF BOTH CERVIX AND UTERUS: Chronic | ICD-10-CM

## 2021-10-18 DIAGNOSIS — D45 POLYCYTHEMIA VERA: ICD-10-CM

## 2021-10-19 ENCOUNTER — RESULT REVIEW (OUTPATIENT)
Age: 76
End: 2021-10-19

## 2021-10-19 ENCOUNTER — APPOINTMENT (OUTPATIENT)
Dept: HEMATOLOGY ONCOLOGY | Facility: CLINIC | Age: 76
End: 2021-10-19
Payer: MEDICARE

## 2021-10-19 VITALS
WEIGHT: 133 LBS | HEART RATE: 79 BPM | TEMPERATURE: 97.6 F | DIASTOLIC BLOOD PRESSURE: 77 MMHG | BODY MASS INDEX: 22.83 KG/M2 | SYSTOLIC BLOOD PRESSURE: 135 MMHG

## 2021-10-19 LAB
BASOPHILS # BLD AUTO: 0.06 K/UL — SIGNIFICANT CHANGE UP (ref 0–0.2)
BASOPHILS NFR BLD AUTO: 0.9 % — SIGNIFICANT CHANGE UP (ref 0–2)
EOSINOPHIL # BLD AUTO: 0.04 K/UL — SIGNIFICANT CHANGE UP (ref 0–0.5)
EOSINOPHIL NFR BLD AUTO: 0.6 % — SIGNIFICANT CHANGE UP (ref 0–6)
HCT VFR BLD CALC: 45.8 % — HIGH (ref 34.5–45)
HGB BLD-MCNC: 16 G/DL — HIGH (ref 11.5–15.5)
IMM GRANULOCYTES NFR BLD AUTO: 0.2 % — SIGNIFICANT CHANGE UP (ref 0–1.5)
LYMPHOCYTES # BLD AUTO: 2.33 K/UL — SIGNIFICANT CHANGE UP (ref 1–3.3)
LYMPHOCYTES # BLD AUTO: 35 % — SIGNIFICANT CHANGE UP (ref 13–44)
MCHC RBC-ENTMCNC: 34.9 GM/DL — SIGNIFICANT CHANGE UP (ref 32–36)
MCHC RBC-ENTMCNC: 35.9 PG — HIGH (ref 27–34)
MCV RBC AUTO: 102.7 FL — HIGH (ref 80–100)
MONOCYTES # BLD AUTO: 0.69 K/UL — SIGNIFICANT CHANGE UP (ref 0–0.9)
MONOCYTES NFR BLD AUTO: 10.4 % — SIGNIFICANT CHANGE UP (ref 2–14)
NEUTROPHILS # BLD AUTO: 3.52 K/UL — SIGNIFICANT CHANGE UP (ref 1.8–7.4)
NEUTROPHILS NFR BLD AUTO: 52.9 % — SIGNIFICANT CHANGE UP (ref 43–77)
NRBC # BLD: 0 /100 WBCS — SIGNIFICANT CHANGE UP (ref 0–0)
PLATELET # BLD AUTO: 417 K/UL — HIGH (ref 150–400)
RBC # BLD: 4.46 M/UL — SIGNIFICANT CHANGE UP (ref 3.8–5.2)
RBC # FLD: 15.1 % — HIGH (ref 10.3–14.5)
WBC # BLD: 6.65 K/UL — SIGNIFICANT CHANGE UP (ref 3.8–10.5)
WBC # FLD AUTO: 6.65 K/UL — SIGNIFICANT CHANGE UP (ref 3.8–10.5)

## 2021-10-19 PROCEDURE — 99214 OFFICE O/P EST MOD 30 MIN: CPT

## 2021-10-19 NOTE — HISTORY OF PRESENT ILLNESS
[Disease: _____________________] : Disease: [unfilled] [T: ___] : T[unfilled] [N: ___] : N[unfilled] [M: ___] : M[unfilled] [AJCC Stage: ____] : AJCC Stage: [unfilled] [de-identified] : MEGHAN HEMPHILL is a 74 y.o. who we are following for an ER+, HER2- stage IA breast cancer and now a JAK2+ MPD.\par 5/21/15 - Presented with a 6mm abnormality in the right breast on routine mammo at 6:00. \par 7/29/15 - Biopsy - well differentiated IDC that was ER/CT strongly positive, HER2 negative.  \par 9/9/15 - Right breast lumpectomy and SLND (Primitivoi) for a T1N0 = stage IA breast cancer.\par 11/2015 - Started on Arimidex.  She is s/p adjuvant XRT. She discontinued Arimidex Fall 2016 due to musculoskeletal side effects. \par She did not want to try any other hormonal therapies. \par \par Routine blood work revealed borderline elevated H/H.  \par 7/9/16 - JAK2 mutation was detected.  s/p  phlebotomies.Thrombocytosis.\par \par Started Hydrea Sept 2020. On 500 mg daily. [de-identified] : well differentiated infiltrating ductal carcinoma [de-identified] : ER 90%, IA 90%, HER2 +1 negative [de-identified] : On Hydrea 500 mg daily. Tolerating well. \par Chronic hip pains due to DJD. Scheduled for left hip replacement tomorrow ( [facility]).\par Mammogram June 2021- OK, for future mammo/ sono recommended due to dense breast ( [facility])  \par

## 2021-10-19 NOTE — ASSESSMENT
[FreeTextEntry1] : 75 y/o woman with hx of a strongly ER/W[place]+, HER2-, right sided stage IA breast cancer, s/p lumpectomy 9/2015, and XRT. Did not tolerate adjuvant hormonal therapy.  Clinically - no evidence of recurrence. Mammogram in June 2022 ( [facility]- goes yearly). will also get routine yearly breast US. \par \par GINO 2 positive P vera. Continue Hydrea 500 mg daily. Counts well controlled.\par \par Continue ASA.\par \par Scheduled for hip replacement surgery tomorrow.\par \par CBC in 3 months.\par Exam in 6 months .

## 2021-10-19 NOTE — PHYSICAL EXAM
[Normal] : affect appropriate [de-identified] : no lymphadenopathy [de-identified] : periareolar lumpectomy scar right breast, no palpable masses, left breast no masses, no axillary  adenopathy b/l  [de-identified] : walks  with walker due to Hip pain

## 2021-10-19 NOTE — REVIEW OF SYSTEMS
[Patient Intake Form Reviewed] : Patient intake form was reviewed [Recent Change In Weight] : ~T recent weight change [Negative] : Heme/Lymph [FreeTextEntry9] : hip pain

## 2021-10-20 DIAGNOSIS — C50.911 MALIGNANT NEOPLASM OF UNSPECIFIED SITE OF RIGHT FEMALE BREAST: ICD-10-CM

## 2021-10-20 DIAGNOSIS — Z08 ENCOUNTER FOR FOLLOW-UP EXAMINATION AFTER COMPLETED TREATMENT FOR MALIGNANT NEOPLASM: ICD-10-CM

## 2021-10-20 LAB
ALBUMIN SERPL ELPH-MCNC: 4.7 G/DL — SIGNIFICANT CHANGE UP (ref 3.3–5)
ALP SERPL-CCNC: 73 U/L — SIGNIFICANT CHANGE UP (ref 40–120)
ALT FLD-CCNC: 17 U/L — SIGNIFICANT CHANGE UP (ref 10–45)
ANION GAP SERPL CALC-SCNC: 13 MMOL/L — SIGNIFICANT CHANGE UP (ref 5–17)
AST SERPL-CCNC: 14 U/L — SIGNIFICANT CHANGE UP (ref 10–40)
BILIRUB SERPL-MCNC: 0.6 MG/DL — SIGNIFICANT CHANGE UP (ref 0.2–1.2)
BUN SERPL-MCNC: 11 MG/DL — SIGNIFICANT CHANGE UP (ref 7–23)
CALCIUM SERPL-MCNC: 11 MG/DL — HIGH (ref 8.4–10.5)
CHLORIDE SERPL-SCNC: 98 MMOL/L — SIGNIFICANT CHANGE UP (ref 96–108)
CO2 SERPL-SCNC: 26 MMOL/L — SIGNIFICANT CHANGE UP (ref 22–31)
CREAT SERPL-MCNC: 0.65 MG/DL — SIGNIFICANT CHANGE UP (ref 0.5–1.3)
GLUCOSE SERPL-MCNC: 92 MG/DL — SIGNIFICANT CHANGE UP (ref 70–99)
POTASSIUM SERPL-MCNC: 5.8 MMOL/L — HIGH (ref 3.5–5.3)
POTASSIUM SERPL-SCNC: 5.8 MMOL/L — HIGH (ref 3.5–5.3)
PROT SERPL-MCNC: 7.4 G/DL — SIGNIFICANT CHANGE UP (ref 6–8.3)
SODIUM SERPL-SCNC: 136 MMOL/L — SIGNIFICANT CHANGE UP (ref 135–145)

## 2022-01-24 ENCOUNTER — OUTPATIENT (OUTPATIENT)
Dept: OUTPATIENT SERVICES | Facility: HOSPITAL | Age: 77
LOS: 1 days | Discharge: HOME | End: 2022-01-24

## 2022-01-24 DIAGNOSIS — Z90.710 ACQUIRED ABSENCE OF BOTH CERVIX AND UTERUS: Chronic | ICD-10-CM

## 2022-01-24 DIAGNOSIS — D45 POLYCYTHEMIA VERA: ICD-10-CM

## 2022-01-25 ENCOUNTER — APPOINTMENT (OUTPATIENT)
Dept: HEMATOLOGY ONCOLOGY | Facility: CLINIC | Age: 77
End: 2022-01-25

## 2022-01-25 ENCOUNTER — RESULT REVIEW (OUTPATIENT)
Age: 77
End: 2022-01-25

## 2022-01-25 VITALS
OXYGEN SATURATION: 98 % | HEART RATE: 88 BPM | DIASTOLIC BLOOD PRESSURE: 78 MMHG | SYSTOLIC BLOOD PRESSURE: 88 MMHG | TEMPERATURE: 97 F | RESPIRATION RATE: 17 BRPM

## 2022-01-25 LAB
BASOPHILS # BLD AUTO: 0.04 K/UL — SIGNIFICANT CHANGE UP (ref 0–0.2)
BASOPHILS NFR BLD AUTO: 0.8 % — SIGNIFICANT CHANGE UP (ref 0–2)
EOSINOPHIL # BLD AUTO: 0.04 K/UL — SIGNIFICANT CHANGE UP (ref 0–0.5)
EOSINOPHIL NFR BLD AUTO: 0.8 % — SIGNIFICANT CHANGE UP (ref 0–6)
HCT VFR BLD CALC: 44.9 % — SIGNIFICANT CHANGE UP (ref 34.5–45)
HGB BLD-MCNC: 14.9 G/DL — SIGNIFICANT CHANGE UP (ref 11.5–15.5)
IMM GRANULOCYTES NFR BLD AUTO: 0.2 % — SIGNIFICANT CHANGE UP (ref 0–1.5)
LYMPHOCYTES # BLD AUTO: 1.57 K/UL — SIGNIFICANT CHANGE UP (ref 1–3.3)
LYMPHOCYTES # BLD AUTO: 31.6 % — SIGNIFICANT CHANGE UP (ref 13–44)
MCHC RBC-ENTMCNC: 33.2 GM/DL — SIGNIFICANT CHANGE UP (ref 32–36)
MCHC RBC-ENTMCNC: 34.2 PG — HIGH (ref 27–34)
MCV RBC AUTO: 103 FL — HIGH (ref 80–100)
MONOCYTES # BLD AUTO: 0.51 K/UL — SIGNIFICANT CHANGE UP (ref 0–0.9)
MONOCYTES NFR BLD AUTO: 10.3 % — SIGNIFICANT CHANGE UP (ref 2–14)
NEUTROPHILS # BLD AUTO: 2.8 K/UL — SIGNIFICANT CHANGE UP (ref 1.8–7.4)
NEUTROPHILS NFR BLD AUTO: 56.3 % — SIGNIFICANT CHANGE UP (ref 43–77)
NRBC # BLD: 0 /100 WBCS — SIGNIFICANT CHANGE UP (ref 0–0)
PLATELET # BLD AUTO: 369 K/UL — SIGNIFICANT CHANGE UP (ref 150–400)
RBC # BLD: 4.36 M/UL — SIGNIFICANT CHANGE UP (ref 3.8–5.2)
RBC # FLD: 13.2 % — SIGNIFICANT CHANGE UP (ref 10.3–14.5)
WBC # BLD: 4.97 K/UL — SIGNIFICANT CHANGE UP (ref 3.8–10.5)
WBC # FLD AUTO: 4.97 K/UL — SIGNIFICANT CHANGE UP (ref 3.8–10.5)

## 2022-02-14 ENCOUNTER — EMERGENCY (EMERGENCY)
Facility: HOSPITAL | Age: 77
LOS: 1 days | Discharge: HOME | End: 2022-02-14
Attending: EMERGENCY MEDICINE
Payer: COMMERCIAL

## 2022-02-14 VITALS
TEMPERATURE: 98 F | HEART RATE: 70 BPM | DIASTOLIC BLOOD PRESSURE: 72 MMHG | WEIGHT: 134.92 LBS | RESPIRATION RATE: 18 BRPM | HEIGHT: 61 IN | SYSTOLIC BLOOD PRESSURE: 125 MMHG | OXYGEN SATURATION: 97 %

## 2022-02-14 DIAGNOSIS — Z90.710 ACQUIRED ABSENCE OF BOTH CERVIX AND UTERUS: Chronic | ICD-10-CM

## 2022-02-14 PROCEDURE — 71101 X-RAY EXAM UNILAT RIBS/CHEST: CPT | Mod: 26

## 2022-02-14 PROCEDURE — 99284 EMERGENCY DEPT VISIT MOD MDM: CPT

## 2022-02-14 PROCEDURE — 71101 X-RAY EXAM UNILAT RIBS/CHEST: CPT

## 2022-02-14 PROCEDURE — 99283 EMERGENCY DEPT VISIT LOW MDM: CPT | Mod: 25

## 2022-02-14 RX ORDER — IBUPROFEN 200 MG
600 TABLET ORAL ONCE
Refills: 0 | Status: COMPLETED | OUTPATIENT
Start: 2022-02-14 | End: 2022-02-14

## 2022-02-14 RX ORDER — ACETAMINOPHEN 500 MG
650 TABLET ORAL ONCE
Refills: 0 | Status: COMPLETED | OUTPATIENT
Start: 2022-02-14 | End: 2022-02-14

## 2022-02-14 RX ADMIN — Medication 600 MILLIGRAM(S): at 13:30

## 2022-02-14 RX ADMIN — Medication 650 MILLIGRAM(S): at 13:29
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
## 2022-02-14 NOTE — ED STATDOCS - NS ED ROS FT
CONSTITUTIONAL: No fevers, no chills  Eyes: No vision changes  Cardiovascular: No Chest pain  Respiratory: No SOB  Gastrointestinal: No n/v/c/d, no abd pain  Genitourinary: no dysuria, no hematuria  SKIN: no rashes.  MSK: +right rib pain  NEURO: no headache, no weakness, no numbness  PSYCHIATRIC: no SI/HI

## 2022-02-14 NOTE — ED STATDOCS - PATIENT PORTAL LINK FT
You can access the FollowMyHealth Patient Portal offered by [facility] by registering at the following website: [email or web address]. By joining [facility]’s FollowMyHealth portal, you will also be able to view your health information using other applications (apps) compatible with our system.

## 2022-02-14 NOTE — ED STATDOCS - NSFOLLOWUPINSTRUCTIONS_ED_ALL_ED_FT
Apply ice to affected area for 15-20 minutes every few hours for the next few days.  Use as much or as frequently as desired. Tylenol extra strength 2 tablets every 4 hours or Ibuprofen 600mg (3 tablets) every 6 hours as needed for aches, pains. Return immediately to the ER for re-evaluation if your symptoms recur or worsening. Otherwise, follow-up with PMD later in the week for reassessment: call today to arrange an appointment
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
## 2022-02-14 NOTE — ED ADULT TRIAGE NOTE - DOMESTIC TRAVEL HIGH RISK QUESTION
DATE OF SURGERY:  04/13/2020    SURGEON:  Breezy Black MD    REFERRING PHYSICIAN: Breezy Black MD    PREOPERATIVE DIAGNOSIS(ES):    1. Necrotic right thumb and thenar skin.  2. 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    POSTOPERATIVE DIAGNOSIS(ES):    1. Necrotic right thumb and thenar skin.  2. 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    PROCEDURE:    1. Disarticulation of the right thumb at the metacarpophalangeal joint.  2. Irrigation and debridement of skin, subcutaneous tissue, muscle, and bone on a 6 x 3 cm open hand wound.  3. Application of an Integra bilayer matrix wound dressing, 10 sq.cm. to the right thumb.  4. Application of a wound VAC negative pressure dressing to the right hand and thumb 10 sq.cm.    ASSISTANT:  Emi Brock PA-C, who was critical and necessary for positioning of the patient, exposure of soft tissue, resection of bone, irrigation and closure of incisions, application of the Integra bilayer matrix graft, application of a negative pressure dressing and splint.    ANESTHESIA:  Interscalene block and general.    ANESTHETIST:  Steven Scherer CRNA.    SPONGE AND NEEDLE COUNT:  Correct.    TYPE OF SURGERY:  Major.    IMPLANTS:  Integra bilayer matrix wound dressing, 10 sq.cm. to the right hand and right thumb.    INDICATION:  The patient is a 24-year-old female.  She has a history of heroin abuse.  The patient injected her heroin agonist 3 months ago into her right hand and right wrist.  She thrombosed acutely her radial artery and developed necrotizing fasciitis of her arm and hand.  The patient had multiple debridements and an acute fasciotomy of the forearm and carpal tunnel release in [facility].  For the last 3 months, the patient has been going to Occupational Therapy as well as Wound Care Center.  Initially, I was concerned  that she could lose her entire hand, but over time, her circulation improved to the other digits in the palm and the patient developed focal necrosis along her entire thenar base extending up to the thumb near the region of the metacarpophalangeal joint.  Eventually, all of this area became necrotic and essentially mummified.  Once the area had fully declared, we started to separate from the underlying granulation tissue, we arrived at a mutual decision to proceed with an extensive debridement, disarticulation of the necrotic thumb, and application of a bilayer matrix allograft and a negative pressure dressing to try and get soft tissue coverage.  The patient does understand that she will need further procedures done for skin grafting.  After thorough discussion of risks, benefits, and alternatives, we arrived at the mutual decision to proceed with surgery.    PROCEDURE NOTE:  After an informed consent was obtained and placed in chart and surgeon marked to confirm the operative site in the holding area, the patient was taken to the operating room.  An interscalene block was placed.  Preoperative antibiotics were given.  A general anesthetic was administered.  SCDs and SHONNA hose were placed on both lower extremities.  At this point, a time-out was called.  We had a tourniquet on her upper arm but was never inflated.     The patient's thenar base had a central necrotic area about 5 to 6 cm long and 2 cm wide.  This extended over the thumb just distal to the level of the metacarpophalangeal joint where the thumb was entirely necrotic.  Using an Adson forceps, Callahan elevator, and sharp scissors, I carefully  the necrotic tissue from the underlying tissue.  I used an oscillating saw to cut the proximal phalanx of the thumb near its base.  I therefore removed the distal necrotic thumb area as well as a large flap of necrotic black skin from the thenar base.  The remaining 1 to 1-1/2 cm of bone of the base of  proximal phalanx was black and necrotic as well and this was also excised.  I carefully debrided any remaining tissue until I got a good bed of bleeding granulation tissue.  This area was carefully irrigated with saline and tourniquet and electrocautery were not used.  I used Adson forceps, scissors, and a 15 blade to debride any devitalized tissue.  Once I had a healthy bed of bleeding tissue and I had irrigated it with saline, I then fashioned my Integra bilayer dermal allograft.  I trimmed it and covered a total area of 10 sq.cm. suturing in place to the margins with 4-0 nylon sutures.  I did place a few central sutures to tack it down and trimmed away the excessive graft.  On the edges, I scraped the cartilage matrix cells off the graft and placed it around the edges to augment healing.  I then fully dried and cleaned the area.  I fashioned a black wound VAC sponge directly over the area.  I then sealed it with Ioban and the wound VAC sponges.  I then hooked it to the table suction to confirm good negative pressure seal.  I then hooked it to a Prevena Plus home unit and confirmed an excellent seal.  I then placed her in a bulky hand dressing and a volar splint. Ace wraps were applied.  A sling was applied.  The Prevena Plus was placed in the sling.  The patient's anesthesia was reversed. She was transferred to a [facility]. She was brought to recovery room in satisfactory condition. There was no intraoperative complication.    POSTOPERATIVE PLAN:    1. The patient will be discharged to home from the same-day surgery center.  2. The patient has a followup appointment to see me this Wednesday at our [place] office.  3. The patient will follow up with the Wound Care Center in [place] and also discuss management to include eventual skin grafting.  4. She will be on prophylactic antibiotics after surgery for a few days.        ______________________________  Breezy Black MD  [id number]      D:  04/13/2020 12:12:13  T:   04/13/2020 15:09:35   [email or web address]   Job:  [id number]              Electronically Signed On 04.13.2020 15:38  ___________________________________________________   Breezy Black MD     No

## 2022-04-14 ENCOUNTER — OUTPATIENT (OUTPATIENT)
Dept: OUTPATIENT SERVICES | Facility: HOSPITAL | Age: 77
LOS: 1 days | Discharge: HOME | End: 2022-04-14

## 2022-04-14 DIAGNOSIS — C50.911 MALIGNANT NEOPLASM OF UNSPECIFIED SITE OF RIGHT FEMALE BREAST: ICD-10-CM

## 2022-04-14 DIAGNOSIS — Z90.710 ACQUIRED ABSENCE OF BOTH CERVIX AND UTERUS: Chronic | ICD-10-CM

## 2022-04-14 DIAGNOSIS — D45 POLYCYTHEMIA VERA: ICD-10-CM

## 2022-04-18 ENCOUNTER — RESULT REVIEW (OUTPATIENT)
Age: 77
End: 2022-04-18

## 2022-04-18 ENCOUNTER — APPOINTMENT (OUTPATIENT)
Dept: HEMATOLOGY ONCOLOGY | Facility: CLINIC | Age: 77
End: 2022-04-18
Payer: MEDICARE

## 2022-04-18 VITALS
SYSTOLIC BLOOD PRESSURE: 138 MMHG | WEIGHT: 134 LBS | TEMPERATURE: 97.6 F | BODY MASS INDEX: 23 KG/M2 | OXYGEN SATURATION: 96 % | RESPIRATION RATE: 18 BRPM | DIASTOLIC BLOOD PRESSURE: 81 MMHG | HEART RATE: 95 BPM

## 2022-04-18 DIAGNOSIS — Z08 ENCOUNTER FOR FOLLOW-UP EXAMINATION AFTER COMPLETED TREATMENT FOR MALIGNANT NEOPLASM: ICD-10-CM

## 2022-04-18 DIAGNOSIS — Z85.3 ENCOUNTER FOR FOLLOW-UP EXAMINATION AFTER COMPLETED TREATMENT FOR MALIGNANT NEOPLASM: ICD-10-CM

## 2022-04-18 LAB
BASOPHILS # BLD AUTO: 0.04 K/UL — SIGNIFICANT CHANGE UP (ref 0–0.2)
BASOPHILS NFR BLD AUTO: 0.4 % — SIGNIFICANT CHANGE UP (ref 0–2)
EOSINOPHIL # BLD AUTO: 0.06 K/UL — SIGNIFICANT CHANGE UP (ref 0–0.5)
EOSINOPHIL NFR BLD AUTO: 0.7 % — SIGNIFICANT CHANGE UP (ref 0–6)
HCT VFR BLD CALC: 43.6 % — SIGNIFICANT CHANGE UP (ref 34.5–45)
HGB BLD-MCNC: 14.9 G/DL — SIGNIFICANT CHANGE UP (ref 11.5–15.5)
IMM GRANULOCYTES NFR BLD AUTO: 0.3 % — SIGNIFICANT CHANGE UP (ref 0–1.5)
LYMPHOCYTES # BLD AUTO: 2.09 K/UL — SIGNIFICANT CHANGE UP (ref 1–3.3)
LYMPHOCYTES # BLD AUTO: 22.6 % — SIGNIFICANT CHANGE UP (ref 13–44)
MCHC RBC-ENTMCNC: 34.2 GM/DL — SIGNIFICANT CHANGE UP (ref 32–36)
MCHC RBC-ENTMCNC: 35.6 PG — HIGH (ref 27–34)
MCV RBC AUTO: 104.1 FL — HIGH (ref 80–100)
MONOCYTES # BLD AUTO: 0.82 K/UL — SIGNIFICANT CHANGE UP (ref 0–0.9)
MONOCYTES NFR BLD AUTO: 8.9 % — SIGNIFICANT CHANGE UP (ref 2–14)
NEUTROPHILS # BLD AUTO: 6.19 K/UL — SIGNIFICANT CHANGE UP (ref 1.8–7.4)
NEUTROPHILS NFR BLD AUTO: 67.1 % — SIGNIFICANT CHANGE UP (ref 43–77)
NRBC # BLD: 0 /100 WBCS — SIGNIFICANT CHANGE UP (ref 0–0)
PLATELET # BLD AUTO: 400 K/UL — SIGNIFICANT CHANGE UP (ref 150–400)
RBC # BLD: 4.19 M/UL — SIGNIFICANT CHANGE UP (ref 3.8–5.2)
RBC # FLD: 15.4 % — HIGH (ref 10.3–14.5)
WBC # BLD: 9.23 K/UL — SIGNIFICANT CHANGE UP (ref 3.8–10.5)
WBC # FLD AUTO: 9.23 K/UL — SIGNIFICANT CHANGE UP (ref 3.8–10.5)

## 2022-04-18 PROCEDURE — 99214 OFFICE O/P EST MOD 30 MIN: CPT

## 2022-04-18 NOTE — REVIEW OF SYSTEMS
[Patient Intake Form Reviewed] : Patient intake form was reviewed [Recent Change In Weight] : ~T recent weight change [Negative] : Heme/Lymph [Joint Pain] : joint pain

## 2022-04-18 NOTE — HISTORY OF PRESENT ILLNESS
[Disease: _____________________] : Disease: [unfilled] [T: ___] : T[unfilled] [N: ___] : N[unfilled] [M: ___] : M[unfilled] [AJCC Stage: ____] : AJCC Stage: [unfilled] [de-identified] : MEGHAN HEMPHILL is a 74 y.o. who we are following for an ER+, HER2- stage IA breast cancer and now a JAK2+ MPD.\par 5/21/15 - Presented with a 6mm abnormality in the right breast on routine mammo at 6:00. \par 7/29/15 - Biopsy - well differentiated IDC that was ER/WY strongly positive, HER2 negative.  \par 9/9/15 - Right breast lumpectomy and SLND (Primitivoi) for a T1N0 = stage IA breast cancer.\par 11/2015 - Started on Arimidex.  She is s/p adjuvant XRT. She discontinued Arimidex Fall 2016 due to musculoskeletal side effects. \par She did not want to try any other hormonal therapies. \par \par Routine blood work revealed borderline elevated H/H.  \par 7/9/16 - JAK2 mutation was detected.  s/p  phlebotomies.Thrombocytosis.\par \par Started Hydrea Sept 2020. On 500 mg daily. [de-identified] : well differentiated infiltrating ductal carcinoma [de-identified] : ER 90%, SD 90%, HER2 +1 negative [de-identified] : HAd hip replacement last year- can walk OK without pain. Continues Hydrea 500 mg daily Still smoking.

## 2022-04-18 NOTE — ASSESSMENT
[FreeTextEntry1] : 76 y/o woman with hx of a strongly ER/RI+, HER2-, right sided stage IA breast cancer, s/p lumpectomy 9/2015, and XRT. Did not tolerate adjuvant hormonal therapy.  Clinically - no evidence of recurrence. Mammogram  will be due in  June 2022 ( [facility]- goes yearly).  \par \par GINO 2 positive P vera. Continue Hydrea 500 mg daily. Counts well controlled.\par \par Continue ASA.\par \par Encouraged again smoking cessation\par \par \par Exam in 6 months .

## 2022-06-10 ENCOUNTER — NON-APPOINTMENT (OUTPATIENT)
Age: 77
End: 2022-06-10

## 2022-07-12 ENCOUNTER — NON-APPOINTMENT (OUTPATIENT)
Age: 77
End: 2022-07-12

## 2022-07-26 ENCOUNTER — NON-APPOINTMENT (OUTPATIENT)
Age: 77
End: 2022-07-26

## 2022-08-16 ENCOUNTER — OUTPATIENT (OUTPATIENT)
Dept: OUTPATIENT SERVICES | Facility: HOSPITAL | Age: 77
LOS: 1 days | End: 2022-08-16
Payer: COMMERCIAL

## 2022-08-16 DIAGNOSIS — Z90.710 ACQUIRED ABSENCE OF BOTH CERVIX AND UTERUS: Chronic | ICD-10-CM

## 2022-08-16 DIAGNOSIS — Z20.828 CONTACT WITH AND (SUSPECTED) EXPOSURE TO OTHER VIRAL COMMUNICABLE DISEASES: ICD-10-CM

## 2022-08-16 LAB — SARS-COV-2 RNA SPEC QL NAA+PROBE: SIGNIFICANT CHANGE UP

## 2022-08-16 PROCEDURE — U0003: CPT

## 2022-08-16 PROCEDURE — U0005: CPT

## 2022-08-18 ENCOUNTER — OUTPATIENT (OUTPATIENT)
Dept: OUTPATIENT SERVICES | Facility: HOSPITAL | Age: 77
LOS: 1 days | Discharge: HOME | End: 2022-08-18
Payer: COMMERCIAL

## 2022-08-18 DIAGNOSIS — M54.16 RADICULOPATHY, LUMBAR REGION: ICD-10-CM

## 2022-08-18 DIAGNOSIS — Z90.710 ACQUIRED ABSENCE OF BOTH CERVIX AND UTERUS: Chronic | ICD-10-CM

## 2022-08-18 PROCEDURE — 62323 NJX INTERLAMINAR LMBR/SAC: CPT

## 2022-08-25 ENCOUNTER — APPOINTMENT (OUTPATIENT)
Dept: ORTHOPEDIC SURGERY | Facility: CLINIC | Age: 77
End: 2022-08-25

## 2022-08-25 VITALS
WEIGHT: 132 LBS | BODY MASS INDEX: 22.53 KG/M2 | HEIGHT: 64 IN | SYSTOLIC BLOOD PRESSURE: 149 MMHG | DIASTOLIC BLOOD PRESSURE: 81 MMHG

## 2022-08-25 PROCEDURE — 72100 X-RAY EXAM L-S SPINE 2/3 VWS: CPT

## 2022-08-25 PROCEDURE — 99214 OFFICE O/P EST MOD 30 MIN: CPT | Mod: 25

## 2022-08-25 PROCEDURE — 20552 NJX 1/MLT TRIGGER POINT 1/2: CPT

## 2022-08-25 NOTE — PHYSICAL EXAM
[Normal] : Gait: normal [Stooped] : stooped [Walker] : ambulates with walker [Santos's Sign] : negative Santos's sign [Pronator Drift] : negative pronator drift [SLR] : negative straight leg raise [de-identified] : 5 out of 5 motor strength, sensation is intact and symmetrical full range of motion flexion extension and rotation, no palpatory tenderness full range of motion of hips knees shoulders and elbows (all four extremities), no atrophy, negative straight leg raise, no pathological reflexes, no swelling, normal ambulation, no apparent distress skin is intact, no palpable lymph nodes, no upper or lower extremity instability, alert and oriented x3 and normal mood. Normal finger-to nose test. Restriction left hip more than right. \par Pain over left SI joint.  [de-identified] : XR AP Lat Lumbar 08/25/2022-severe right hip arthritis-reviewed with patient. \par \par XR AP Lat Lumbar 09/13/2021 - Severe spinal stenosis present in lumbar region. She also has severe bone-on-bone articulation bilateral hips-reviewed with patient and family member.\par \par MRI-3T LUMBAR SPINE NON CONTRAST    09/16/2021   ([facility]) \par \par Findings: Vertebral bodies are normal in height without a fracture or pars\par defect. Signal arising from bone marrow does not demonstrate an aggressive\par osseous lesion or infiltrative process. Bone marrow edema along the superior L3\par and lesser degree inferior L2 endplates are new since the prior study indicative\par prominent type I Modic changes. There is a hemangioma within L5 which is stable\par since the prior exam. The lower cord and conus are normal in signal and\par morphology.\par The T11-T12 is again a moderate broad-based posterior disc protrusion\par superimposed on disc osteophytic ridging. Facet joints are moderately\par degenerated. There is moderate thecal sac compression and mild compression of\par the anterior margin of the spinal cord.\par At L1-2, there is a small circumferential disc osteophytic ridge, stable.\par There is mild to moderate facet arthrosis. There is mild thecal sac\par compression. There is mild bilateral foraminal stenosis.\par At L2-3, there is moderate circumferential disc bulge which has progressed\par since the prior study. There is mild osteophytic ridging. There is moderate\par facet arthrosis. There is severe thecal sac compression. There is impingement\par of the proximal cauda equina is slightly buckled proximally. There is moderate\par to severe bilateral foraminal stenosis. There is mild impingement of exiting\par nerve roots.\par At L3-4, There is moderate circumferential disc osteophytic ridge which extends\par into the neural foramen bilaterally. There is moderate facet arthrosis. There\par is ligamentum flavum thickening. There is severe thecal sac compression. There\par is near complete effacement of CSF. There is severe left foraminal stenosis\par with compression of the exiting nerve root. There is moderate right foraminal\par stenosis with abutment of the exiting nerve root. Findings are stable.\par At L4-5, There is moderate circumferential disc osteophytic ridge which extends \par into the neural foramen bilaterally. There is severe facet arthrosis. There is\par prominent ligamentum flavum thickening. There is severe thecal sac\par compression. There is impingement of L5 nerve root origins. There is severe\par left foraminal stenosis with compression of the exiting nerve root. There is\par moderate to severe right foraminal stenosis with mild impingement of exiting\par nerve root.\par At L5-S1, There is moderate circumferential disc osteophytic ridge which\par extends into the neural foramen bilaterally. There is moderate to severe facet\par arthrosis. There is mild thecal sac compression. There is severe bilateral\par foraminal stenosis. There is compression of the exiting nerve roots.\par Paraspinal musculature is symmetric. There is no bulky lymphadenopathy.\par Impression:\par Prominent degenerative disc disease and facet arthrosis throughout the lumbar\par spine as well as at the lower thoracic levels.\par Degenerative changes at L2-L3 have progressed since the prior exam and there are\par prominent type I Modic changes at the superior L3 and lesser degree inferior L2\par endplates. There is severe thecal sac compression with near complete effacement\par of CSF and impingement of the cauda equina. There is moderate to severe\par foraminal stenosis.\par At L3-L4 there is severe thecal sac compression. There is severe left and\par moderate right foraminal stenosis.\par At L4-L5 there is severe thecal sac compression. There is severe left and\par moderate to severe right foraminal stenosis.\par At L5-S1 there is severe bilateral foraminal stenosis. \par \par \par XR AP Lat Lumbar 09/13/2021 - lumbar degenerative disc disease, hip arthritis -reviewed with patient.

## 2022-08-25 NOTE — DISCUSSION/SUMMARY
[Surgical risks reviewed] : Surgical risks reviewed [de-identified] : Lumbago \par Right hip arthritis.\par Left hip replacement.\par Left sacroiliitis.\par L2-5 severe stenosis.\par Discussed all options. \par I offered an injection after all risks were explained including allergic reaction to an infection under sterile conditions 1 mg of Depo-Medrol and 2 cc of 1% lidocaine without epinephrine was injected into the painful site. The patient tolerated the procedure well and received significant relief following the injection.\par Continue PT.\par Risks of surgery include infection, dural tear, nerve root injury, reherniation, future back pain, future leg pain, retained fragment, hematoma, urinary retention, worsening leg symptoms, footdrop, anesthetic risks, blood transfusion risks, positioning pain, visceral and vascular injury, deep vein thrombosis, pulmonary embolus, and death. All risks were explained not exclusive to the ones mentioned alternatives were discussed and all questions were answered the patient agrees and understands the above and is in complete agreement with the plan. \par All questions were answered, all alternatives discussed and the patient is in complete agreement with the plan. Followup appointment as instructed, and the patient is in complete agreement with plan. Followup appointment as instructed. Any issues and the patient will call or come in sooner. All options discussed including rest, medicine, home exercise, acupuncture, \par Chiropractic care, physical therapy, pain management and last resort surgery. \par Here with family member.
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
## 2022-09-07 ENCOUNTER — NON-APPOINTMENT (OUTPATIENT)
Age: 77
End: 2022-09-07

## 2022-09-21 ENCOUNTER — EMERGENCY (EMERGENCY)
Facility: HOSPITAL | Age: 77
LOS: 0 days | Discharge: HOME | End: 2022-09-22
Attending: EMERGENCY MEDICINE
Payer: MEDICARE

## 2022-09-21 VITALS — WEIGHT: 139.99 LBS | HEIGHT: 61 IN

## 2022-09-21 DIAGNOSIS — M54.50 LOW BACK PAIN, UNSPECIFIED: ICD-10-CM

## 2022-09-21 DIAGNOSIS — M54.16 RADICULOPATHY, LUMBAR REGION: ICD-10-CM
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
## 2022-09-21 DIAGNOSIS — F31.9 BIPOLAR DISORDER, UNSPECIFIED: ICD-10-CM

## 2022-09-21 DIAGNOSIS — E78.5 HYPERLIPIDEMIA, UNSPECIFIED: ICD-10-CM
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
## 2022-09-21 DIAGNOSIS — Z90.710 ACQUIRED ABSENCE OF BOTH CERVIX AND UTERUS: Chronic | ICD-10-CM

## 2022-09-21 DIAGNOSIS — I10 ESSENTIAL (PRIMARY) HYPERTENSION: ICD-10-CM

## 2022-09-21 PROCEDURE — 72131 CT LUMBAR SPINE W/O DYE: CPT | Mod: MA

## 2022-09-21 PROCEDURE — 73502 X-RAY EXAM HIP UNI 2-3 VIEWS: CPT | Mod: LT

## 2022-09-21 PROCEDURE — 99284 EMERGENCY DEPT VISIT MOD MDM: CPT | Mod: 25

## 2022-09-21 PROCEDURE — 99284 EMERGENCY DEPT VISIT MOD MDM: CPT
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
## 2022-09-22 VITALS
OXYGEN SATURATION: 96 % | HEART RATE: 71 BPM | SYSTOLIC BLOOD PRESSURE: 133 MMHG | TEMPERATURE: 98 F | RESPIRATION RATE: 17 BRPM | DIASTOLIC BLOOD PRESSURE: 72 MMHG

## 2022-09-22 PROCEDURE — 72131 CT LUMBAR SPINE W/O DYE: CPT | Mod: 26,MA

## 2022-09-22 PROCEDURE — 73502 X-RAY EXAM HIP UNI 2-3 VIEWS: CPT | Mod: 26,LT

## 2022-09-22 RX ORDER — LIDOCAINE 4 G/100G
1 CREAM TOPICAL
Qty: 10 | Refills: 0
Start: 2022-09-22 | End: 2022-09-26

## 2022-09-22 RX ORDER — DEXAMETHASONE 0.5 MG/5ML
10 ELIXIR ORAL ONCE
Refills: 0 | Status: COMPLETED | OUTPATIENT
Start: 2022-09-22 | End: 2022-09-22

## 2022-09-22 RX ORDER — ACETAMINOPHEN 500 MG
2 TABLET ORAL
Qty: 30 | Refills: 0
Start: 2022-09-22

## 2022-09-22 RX ORDER — ACETAMINOPHEN 500 MG
1000 TABLET ORAL ONCE
Refills: 0 | Status: COMPLETED | OUTPATIENT
Start: 2022-09-22 | End: 2022-09-22

## 2022-09-22 RX ORDER — CYCLOBENZAPRINE HYDROCHLORIDE 10 MG/1
10 TABLET, FILM COATED ORAL ONCE
Refills: 0 | Status: COMPLETED | OUTPATIENT
Start: 2022-09-22 | End: 2022-09-22

## 2022-09-22 RX ORDER — CYCLOBENZAPRINE HYDROCHLORIDE 10 MG/1
1 TABLET, FILM COATED ORAL
Qty: 10 | Refills: 0
Start: 2022-09-22 | End: 2022-09-26

## 2022-09-22 RX ORDER — LIDOCAINE 4 G/100G
1 CREAM TOPICAL ONCE
Refills: 0 | Status: COMPLETED | OUTPATIENT
Start: 2022-09-22 | End: 2022-09-22

## 2022-09-22 RX ADMIN — CYCLOBENZAPRINE HYDROCHLORIDE 10 MILLIGRAM(S): 10 TABLET, FILM COATED ORAL at 01:59

## 2022-09-22 RX ADMIN — Medication 1000 MILLIGRAM(S): at 02:06

## 2022-09-22 RX ADMIN — LIDOCAINE 1 PATCH: 4 CREAM TOPICAL at 01:58

## 2022-09-22 RX ADMIN — Medication 10 MILLIGRAM(S): at 02:05

## 2022-09-22 NOTE — ED PROVIDER NOTE - MUSCULOSKELETAL, MLM
Spine appears normal without any midline spinal tenderness, range of motion is not limited, no muscle or joint tenderness

## 2022-09-22 NOTE — ED PROVIDER NOTE - PATIENT PORTAL LINK FT
You can access the FollowMyHealth Patient Portal offered by [facility] by registering at the following website: [email or web address]. By joining [facility]’s FollowMyHealth portal, you will also be able to view your health information using other applications (apps) compatible with our system.
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
## 2022-09-22 NOTE — ED PROVIDER NOTE - NSFOLLOWUPINSTRUCTIONS_ED_ALL_ED_FT
meds at your pharmacy.  See your orthopedic spine specialist.  Get MRI as planned in 3-4 days.                                                                                              Radicular Pain    Back view of a person showing a normal leg and a leg affected by the pain of sciatica.    Radicular pain is a type of pain that spreads from your back or neck along a spinal nerve. Spinal nerves are nerves that leave the spinal cord and go to the muscles. Radicular pain is sometimes called radiculopathy, radiculitis, or a pinched nerve. When you have this type of pain, you may also have weakness, numbness, or tingling in the area of your body that is supplied by the nerve. The pain may feel sharp and burning. Depending on which spinal nerve is affected, the pain may occur in the:  •Neck area (cervical radicular pain). You may also feel pain, numbness, weakness, or tingling in the arms.      •Mid-spine area (thoracic radicular pain). You would feel this pain in the back and chest. This type is rare.      •Lower back area (lumbar radicular pain). You would feel this pain as low back pain. You may feel pain, numbness, weakness, or tingling in the buttocks or legs. Sciatica is a type of lumbar radicular pain that shoots down the back of the leg.      Radicular pain occurs when one of the spinal nerves becomes irritated or squeezed (compressed). It is often caused by something pushing on a spinal nerve, such as one of the bones of the spine (vertebrae) or one of the round cushions between vertebrae (intervertebral disks). This can result from:  •An injury.       •Wear and tear or aging of a disk.       •The growth of a bone spur that pushes on the nerve.      Radicular pain often goes away when you follow instructions from your health care provider for relieving pain at home.      How is this treated?    Treatment may depend on the cause of the condition and may include:  •Working with a physical therapist.      •Taking pain medicine.      •Applying heat or ice or both to the affected areas.      •Doing stretches to improve flexibility.      •Having surgery. This may be needed if other treatments do not help. Different types of surgery may be done depending on the cause of this condition.        Follow these instructions at home:      Managing pain       Bag of ice on a towel on the skin.       A heating pad for use on the painful area.   •If directed, put ice on the affected area. To do this:  •Put ice in a plastic bag.      •Place a towel between your skin and the bag.      •Leave the ice on for 20 minutes, 2–3 times a day.      •Remove the ice if your skin turns bright red. This is very important. If you cannot feel pain, heat, or cold, you have a greater risk of damage to the area.      •If directed, apply heat to the affected area as often as told by your health care provider. Use the heat source that your health care provider recommends, such as a moist heat pack or a heating pad.  •Place a towel between your skin and the heat source.      •Leave the heat on for 20–30 minutes.      •Remove the heat if your skin turns bright red. This is especially important if you are unable to feel pain, heat, or cold. You have a greater risk of getting burned.        Activity     • Do not sit or rest in bed for long periods of time.      •Try to stay as active as possible. Ask your health care provider what type of exercise or activity is best for you.      •Avoid activities that make your pain worse, such as bending and lifting.      •You may have to avoid lifting. Ask your health care provider how much you can safely lift.      •Practice using proper technique when lifting items. Proper lifting technique involves bending your knees and rising up.      •Do strength and range-of-motion exercises only as told by your health care provider or physical therapist.      General instructions     •Take over-the-counter and prescription medicines only as told by your health care provider.      •Pay attention to any changes in your symptoms.      •Keep all follow-up visits. This is important.        Contact a health care provider if:    •Your pain and other symptoms get worse.      •Your pain medicine is not helping.      •Your pain has not improved after a few weeks of home care.      •You have a fever.        Get help right away if:    •You have severe pain, weakness, or numbness.      •You have difficulty with bladder or bowel control.        Summary    •Radicular pain is a type of pain that spreads from your back or neck along a spinal nerve.      •When you have radicular pain, you may also have weakness, numbness, or tingling in the area of your body that is supplied by the nerve.      •The pain may feel sharp or burning.      •Radicular pain may be treated with ice, heat, medicines, or physical therapy.      This information is not intended to replace advice given to you by your health care provider. Make sure you discuss any questions you have with your health care provider.      Document Revised: 06/23/2022 Document Reviewed: 06/23/2022    Elsevier Patient Education © 2022 [employer]

## 2022-10-10 ENCOUNTER — NON-APPOINTMENT (OUTPATIENT)
Age: 77
End: 2022-10-10

## 2022-10-11 ENCOUNTER — INPATIENT (INPATIENT)
Facility: HOSPITAL | Age: 77
LOS: 2 days | Discharge: HOME | DRG: 190 | End: 2022-10-14
Attending: HOSPITALIST | Admitting: INTERNAL MEDICINE
Payer: MEDICARE

## 2022-10-11 VITALS
SYSTOLIC BLOOD PRESSURE: 128 MMHG | DIASTOLIC BLOOD PRESSURE: 94 MMHG | TEMPERATURE: 98 F | HEART RATE: 109 BPM | RESPIRATION RATE: 26 BRPM | OXYGEN SATURATION: 100 % | HEIGHT: 61 IN | WEIGHT: 145.06 LBS

## 2022-10-11 DIAGNOSIS — Z90.710 ACQUIRED ABSENCE OF BOTH CERVIX AND UTERUS: Chronic | ICD-10-CM

## 2022-10-11 DIAGNOSIS — J44.1 CHRONIC OBSTRUCTIVE PULMONARY DISEASE WITH (ACUTE) EXACERBATION: ICD-10-CM

## 2022-10-11 LAB
ADD ON TEST-SPECIMEN IN LAB: SIGNIFICANT CHANGE UP
ALBUMIN SERPL ELPH-MCNC: 3.5 G/DL — SIGNIFICANT CHANGE UP (ref 3.3–5)
ALP SERPL-CCNC: 46 U/L — SIGNIFICANT CHANGE UP (ref 40–120)
ALT FLD-CCNC: 21 U/L — SIGNIFICANT CHANGE UP (ref 12–78)
ANION GAP SERPL CALC-SCNC: 3 MMOL/L — LOW (ref 5–17)
APPEARANCE UR: CLEAR — SIGNIFICANT CHANGE UP
AST SERPL-CCNC: 32 U/L — SIGNIFICANT CHANGE UP (ref 15–37)
BASOPHILS # BLD AUTO: 0.03 K/UL — SIGNIFICANT CHANGE UP (ref 0–0.2)
BASOPHILS NFR BLD AUTO: 0.4 % — SIGNIFICANT CHANGE UP (ref 0–2)
BILIRUB SERPL-MCNC: 0.4 MG/DL — SIGNIFICANT CHANGE UP (ref 0.2–1.2)
BILIRUB UR-MCNC: NEGATIVE — SIGNIFICANT CHANGE UP
BUN SERPL-MCNC: 13 MG/DL — SIGNIFICANT CHANGE UP (ref 7–23)
CALCIUM SERPL-MCNC: 9.4 MG/DL — SIGNIFICANT CHANGE UP (ref 8.5–10.1)
CHLORIDE SERPL-SCNC: 99 MMOL/L — SIGNIFICANT CHANGE UP (ref 96–108)
CO2 SERPL-SCNC: 29 MMOL/L — SIGNIFICANT CHANGE UP (ref 22–31)
COLOR SPEC: YELLOW — SIGNIFICANT CHANGE UP
CREAT SERPL-MCNC: 0.59 MG/DL — SIGNIFICANT CHANGE UP (ref 0.5–1.3)
DIFF PNL FLD: ABNORMAL
EGFR: 92 ML/MIN/1.73M2 — SIGNIFICANT CHANGE UP
EOSINOPHIL # BLD AUTO: 0 K/UL — SIGNIFICANT CHANGE UP (ref 0–0.5)
EOSINOPHIL NFR BLD AUTO: 0 % — SIGNIFICANT CHANGE UP (ref 0–6)
GLUCOSE SERPL-MCNC: 125 MG/DL — HIGH (ref 70–99)
GLUCOSE UR QL: NEGATIVE — SIGNIFICANT CHANGE UP
HCT VFR BLD CALC: 45.1 % — HIGH (ref 34.5–45)
HGB BLD-MCNC: 15.8 G/DL — HIGH (ref 11.5–15.5)
IMM GRANULOCYTES NFR BLD AUTO: 0.5 % — SIGNIFICANT CHANGE UP (ref 0–0.9)
KETONES UR-MCNC: NEGATIVE — SIGNIFICANT CHANGE UP
LEUKOCYTE ESTERASE UR-ACNC: ABNORMAL
LYMPHOCYTES # BLD AUTO: 0.34 K/UL — LOW (ref 1–3.3)
LYMPHOCYTES # BLD AUTO: 4.6 % — LOW (ref 13–44)
MAGNESIUM SERPL-MCNC: 2 MG/DL — SIGNIFICANT CHANGE UP (ref 1.6–2.6)
MCHC RBC-ENTMCNC: 35 GM/DL — SIGNIFICANT CHANGE UP (ref 32–36)
MCHC RBC-ENTMCNC: 37.3 PG — HIGH (ref 27–34)
MCV RBC AUTO: 106.4 FL — HIGH (ref 80–100)
MONOCYTES # BLD AUTO: 0.16 K/UL — SIGNIFICANT CHANGE UP (ref 0–0.9)
MONOCYTES NFR BLD AUTO: 2.1 % — SIGNIFICANT CHANGE UP (ref 2–14)
NEUTROPHILS # BLD AUTO: 6.88 K/UL — SIGNIFICANT CHANGE UP (ref 1.8–7.4)
NEUTROPHILS NFR BLD AUTO: 92.4 % — HIGH (ref 43–77)
NITRITE UR-MCNC: NEGATIVE — SIGNIFICANT CHANGE UP
PH UR: 6.5 — SIGNIFICANT CHANGE UP (ref 5–8)
PLATELET # BLD AUTO: 317 K/UL — SIGNIFICANT CHANGE UP (ref 150–400)
POTASSIUM SERPL-MCNC: 5.1 MMOL/L — SIGNIFICANT CHANGE UP (ref 3.5–5.3)
POTASSIUM SERPL-SCNC: 5.1 MMOL/L — SIGNIFICANT CHANGE UP (ref 3.5–5.3)
PROT SERPL-MCNC: 7.8 GM/DL — SIGNIFICANT CHANGE UP (ref 6–8.3)
PROT UR-MCNC: NEGATIVE — SIGNIFICANT CHANGE UP
RAPID RVP RESULT: DETECTED
RBC # BLD: 4.24 M/UL — SIGNIFICANT CHANGE UP (ref 3.8–5.2)
RBC # FLD: 13.8 % — SIGNIFICANT CHANGE UP (ref 10.3–14.5)
RSV RNA SPEC QL NAA+PROBE: DETECTED
SARS-COV-2 RNA SPEC QL NAA+PROBE: SIGNIFICANT CHANGE UP
SODIUM SERPL-SCNC: 131 MMOL/L — LOW (ref 135–145)
SP GR SPEC: 1 — LOW (ref 1.01–1.02)
TROPONIN I, HIGH SENSITIVITY RESULT: 47.05 NG/L — SIGNIFICANT CHANGE UP
UROBILINOGEN FLD QL: NEGATIVE — SIGNIFICANT CHANGE UP
WBC # BLD: 7.45 K/UL — SIGNIFICANT CHANGE UP (ref 3.8–10.5)
WBC # FLD AUTO: 7.45 K/UL — SIGNIFICANT CHANGE UP (ref 3.8–10.5)

## 2022-10-11 PROCEDURE — 80053 COMPREHEN METABOLIC PANEL: CPT

## 2022-10-11 PROCEDURE — 80061 LIPID PANEL: CPT

## 2022-10-11 PROCEDURE — 85379 FIBRIN DEGRADATION QUANT: CPT

## 2022-10-11 PROCEDURE — 85025 COMPLETE CBC W/AUTO DIFF WBC: CPT

## 2022-10-11 PROCEDURE — 85730 THROMBOPLASTIN TIME PARTIAL: CPT

## 2022-10-11 PROCEDURE — 85027 COMPLETE CBC AUTOMATED: CPT

## 2022-10-11 PROCEDURE — 85610 PROTHROMBIN TIME: CPT

## 2022-10-11 PROCEDURE — 80048 BASIC METABOLIC PNL TOTAL CA: CPT

## 2022-10-11 PROCEDURE — 36415 COLL VENOUS BLD VENIPUNCTURE: CPT

## 2022-10-11 PROCEDURE — 94760 N-INVAS EAR/PLS OXIMETRY 1: CPT

## 2022-10-11 PROCEDURE — 83735 ASSAY OF MAGNESIUM: CPT

## 2022-10-11 PROCEDURE — 83036 HEMOGLOBIN GLYCOSYLATED A1C: CPT

## 2022-10-11 PROCEDURE — 99285 EMERGENCY DEPT VISIT HI MDM: CPT

## 2022-10-11 PROCEDURE — 71045 X-RAY EXAM CHEST 1 VIEW: CPT | Mod: 26

## 2022-10-11 PROCEDURE — 93010 ELECTROCARDIOGRAM REPORT: CPT

## 2022-10-11 PROCEDURE — 93005 ELECTROCARDIOGRAM TRACING: CPT

## 2022-10-11 PROCEDURE — 94640 AIRWAY INHALATION TREATMENT: CPT

## 2022-10-11 RX ORDER — ALBUTEROL 90 UG/1
2.5 AEROSOL, METERED ORAL ONCE
Refills: 0 | Status: COMPLETED | OUTPATIENT
Start: 2022-10-11 | End: 2022-10-11

## 2022-10-11 RX ADMIN — ALBUTEROL 2.5 MILLIGRAM(S): 90 AEROSOL, METERED ORAL at 15:46

## 2022-10-11 RX ADMIN — Medication 125 MILLIGRAM(S): at 15:47

## 2022-10-11 RX ADMIN — ALBUTEROL 2.5 MILLIGRAM(S): 90 AEROSOL, METERED ORAL at 18:04

## 2022-10-11 NOTE — ED PROVIDER NOTE - PROGRESS NOTE DETAILS
still wheezing after nebs and solumedrol.  96% on RA when at rest but desat to 85% RA with ambulation. will admit for COPD exacerbation in setting of +RSV

## 2022-10-11 NOTE — ED ADULT NURSE NOTE - NSFALLRSKASSESSDT_ED_ALL_ED
----- Message from Cydney Arguelles PA-C sent at 2/2/2022  8:34 AM CST -----  Please call the patient, COVID test was positive. Strep negative   11-Oct-2022 16:53

## 2022-10-11 NOTE — PHARMACOTHERAPY INTERVENTION NOTE - COMMENTS
Medication reconciliation completed.  Reviewed Medication list and confirmed med allergies with patient; confirmed with Dr. First Medgala.

## 2022-10-11 NOTE — ED PROVIDER NOTE - OBJECTIVE STATEMENT
78 y/o female with a PMHx of bipolar 1 disorder, COPD, depression presents to the ED BIBA c/o SOB x 3days. Denies CP, fevers. No known sick contacts. Smoker. No other complaints at this time. PCP: Dr. Pina.

## 2022-10-11 NOTE — ED ADULT NURSE NOTE - OBJECTIVE STATEMENT
Pt p/w c/o sob x 3 days, pack a day smoker.  received 3 duonebs en route. denies fever or chills. does not use home 02.

## 2022-10-12 DIAGNOSIS — R06.2 WHEEZING: ICD-10-CM

## 2022-10-12 DIAGNOSIS — J44.9 CHRONIC OBSTRUCTIVE PULMONARY DISEASE, UNSPECIFIED: ICD-10-CM

## 2022-10-12 DIAGNOSIS — F17.210 NICOTINE DEPENDENCE, CIGARETTES, UNCOMPLICATED: ICD-10-CM

## 2022-10-12 DIAGNOSIS — B33.8 OTHER SPECIFIED VIRAL DISEASES: ICD-10-CM

## 2022-10-12 LAB
A1C WITH ESTIMATED AVERAGE GLUCOSE RESULT: 5.4 % — SIGNIFICANT CHANGE UP (ref 4–5.6)
ALBUMIN SERPL ELPH-MCNC: 3.4 G/DL — SIGNIFICANT CHANGE UP (ref 3.3–5)
ALP SERPL-CCNC: 46 U/L — SIGNIFICANT CHANGE UP (ref 40–120)
ALT FLD-CCNC: 20 U/L — SIGNIFICANT CHANGE UP (ref 12–78)
ANION GAP SERPL CALC-SCNC: 3 MMOL/L — LOW (ref 5–17)
APTT BLD: 29 SEC — SIGNIFICANT CHANGE UP (ref 27.5–35.5)
AST SERPL-CCNC: 15 U/L — SIGNIFICANT CHANGE UP (ref 15–37)
BASOPHILS # BLD AUTO: 0.02 K/UL — SIGNIFICANT CHANGE UP (ref 0–0.2)
BASOPHILS NFR BLD AUTO: 0.2 % — SIGNIFICANT CHANGE UP (ref 0–2)
BILIRUB SERPL-MCNC: 0.3 MG/DL — SIGNIFICANT CHANGE UP (ref 0.2–1.2)
BUN SERPL-MCNC: 12 MG/DL — SIGNIFICANT CHANGE UP (ref 7–23)
CALCIUM SERPL-MCNC: 9 MG/DL — SIGNIFICANT CHANGE UP (ref 8.5–10.1)
CHLORIDE SERPL-SCNC: 98 MMOL/L — SIGNIFICANT CHANGE UP (ref 96–108)
CHOLEST SERPL-MCNC: 197 MG/DL — SIGNIFICANT CHANGE UP
CO2 SERPL-SCNC: 31 MMOL/L — SIGNIFICANT CHANGE UP (ref 22–31)
CREAT SERPL-MCNC: 0.48 MG/DL — LOW (ref 0.5–1.3)
EGFR: 97 ML/MIN/1.73M2 — SIGNIFICANT CHANGE UP
EOSINOPHIL # BLD AUTO: 0 K/UL — SIGNIFICANT CHANGE UP (ref 0–0.5)
EOSINOPHIL NFR BLD AUTO: 0 % — SIGNIFICANT CHANGE UP (ref 0–6)
ESTIMATED AVERAGE GLUCOSE: 108 MG/DL — SIGNIFICANT CHANGE UP (ref 68–114)
GLUCOSE SERPL-MCNC: 122 MG/DL — HIGH (ref 70–99)
HCT VFR BLD CALC: 45.8 % — HIGH (ref 34.5–45)
HDLC SERPL-MCNC: 97 MG/DL — SIGNIFICANT CHANGE UP
HGB BLD-MCNC: 15.9 G/DL — HIGH (ref 11.5–15.5)
IMM GRANULOCYTES NFR BLD AUTO: 0.5 % — SIGNIFICANT CHANGE UP (ref 0–0.9)
INR BLD: 0.96 RATIO — SIGNIFICANT CHANGE UP (ref 0.88–1.16)
LIPID PNL WITH DIRECT LDL SERPL: 87 MG/DL — SIGNIFICANT CHANGE UP
LYMPHOCYTES # BLD AUTO: 0.51 K/UL — LOW (ref 1–3.3)
LYMPHOCYTES # BLD AUTO: 5.1 % — LOW (ref 13–44)
MCHC RBC-ENTMCNC: 34.7 GM/DL — SIGNIFICANT CHANGE UP (ref 32–36)
MCHC RBC-ENTMCNC: 36.6 PG — HIGH (ref 27–34)
MCV RBC AUTO: 105.5 FL — HIGH (ref 80–100)
MONOCYTES # BLD AUTO: 0.71 K/UL — SIGNIFICANT CHANGE UP (ref 0–0.9)
MONOCYTES NFR BLD AUTO: 7.1 % — SIGNIFICANT CHANGE UP (ref 2–14)
NEUTROPHILS # BLD AUTO: 8.68 K/UL — HIGH (ref 1.8–7.4)
NEUTROPHILS NFR BLD AUTO: 87.1 % — HIGH (ref 43–77)
NON HDL CHOLESTEROL: 100 MG/DL — SIGNIFICANT CHANGE UP
PLATELET # BLD AUTO: 340 K/UL — SIGNIFICANT CHANGE UP (ref 150–400)
POTASSIUM SERPL-MCNC: 4.4 MMOL/L — SIGNIFICANT CHANGE UP (ref 3.5–5.3)
POTASSIUM SERPL-SCNC: 4.4 MMOL/L — SIGNIFICANT CHANGE UP (ref 3.5–5.3)
PROT SERPL-MCNC: 7.5 GM/DL — SIGNIFICANT CHANGE UP (ref 6–8.3)
PROTHROM AB SERPL-ACNC: 11.1 SEC — SIGNIFICANT CHANGE UP (ref 10.5–13.4)
RBC # BLD: 4.34 M/UL — SIGNIFICANT CHANGE UP (ref 3.8–5.2)
RBC # FLD: 13.7 % — SIGNIFICANT CHANGE UP (ref 10.3–14.5)
SODIUM SERPL-SCNC: 132 MMOL/L — LOW (ref 135–145)
TRIGL SERPL-MCNC: 66 MG/DL — SIGNIFICANT CHANGE UP
WBC # BLD: 9.97 K/UL — SIGNIFICANT CHANGE UP (ref 3.8–10.5)
WBC # FLD AUTO: 9.97 K/UL — SIGNIFICANT CHANGE UP (ref 3.8–10.5)

## 2022-10-12 PROCEDURE — 12345: CPT | Mod: NC

## 2022-10-12 PROCEDURE — 93010 ELECTROCARDIOGRAM REPORT: CPT

## 2022-10-12 PROCEDURE — 99223 1ST HOSP IP/OBS HIGH 75: CPT

## 2022-10-12 RX ORDER — PANTOPRAZOLE SODIUM 20 MG/1
40 TABLET, DELAYED RELEASE ORAL
Refills: 0 | Status: DISCONTINUED | OUTPATIENT
Start: 2022-10-12 | End: 2022-10-14

## 2022-10-12 RX ORDER — AMLODIPINE BESYLATE 2.5 MG/1
2.5 TABLET ORAL DAILY
Refills: 0 | Status: DISCONTINUED | OUTPATIENT
Start: 2022-10-12 | End: 2022-10-14

## 2022-10-12 RX ORDER — ASPIRIN/CALCIUM CARB/MAGNESIUM 324 MG
81 TABLET ORAL DAILY
Refills: 0 | Status: DISCONTINUED | OUTPATIENT
Start: 2022-10-12 | End: 2022-10-14

## 2022-10-12 RX ORDER — ALBUTEROL 90 UG/1
2 AEROSOL, METERED ORAL EVERY 4 HOURS
Refills: 0 | Status: DISCONTINUED | OUTPATIENT
Start: 2022-10-12 | End: 2022-10-14

## 2022-10-12 RX ORDER — CYCLOBENZAPRINE HYDROCHLORIDE 10 MG/1
10 TABLET, FILM COATED ORAL
Refills: 0 | Status: DISCONTINUED | OUTPATIENT
Start: 2022-10-12 | End: 2022-10-14

## 2022-10-12 RX ORDER — ACETAMINOPHEN 500 MG
650 TABLET ORAL EVERY 6 HOURS
Refills: 0 | Status: DISCONTINUED | OUTPATIENT
Start: 2022-10-12 | End: 2022-10-14

## 2022-10-12 RX ORDER — FLUOXETINE HCL 10 MG
10 CAPSULE ORAL DAILY
Refills: 0 | Status: DISCONTINUED | OUTPATIENT
Start: 2022-10-12 | End: 2022-10-14

## 2022-10-12 RX ORDER — ENOXAPARIN SODIUM 100 MG/ML
40 INJECTION SUBCUTANEOUS EVERY 24 HOURS
Refills: 0 | Status: DISCONTINUED | OUTPATIENT
Start: 2022-10-12 | End: 2022-10-14

## 2022-10-12 RX ORDER — ATORVASTATIN CALCIUM 80 MG/1
10 TABLET, FILM COATED ORAL AT BEDTIME
Refills: 0 | Status: DISCONTINUED | OUTPATIENT
Start: 2022-10-12 | End: 2022-10-14

## 2022-10-12 RX ORDER — TRAZODONE HCL 50 MG
50 TABLET ORAL AT BEDTIME
Refills: 0 | Status: DISCONTINUED | OUTPATIENT
Start: 2022-10-12 | End: 2022-10-14

## 2022-10-12 RX ORDER — GABAPENTIN 400 MG/1
300 CAPSULE ORAL DAILY
Refills: 0 | Status: DISCONTINUED | OUTPATIENT
Start: 2022-10-12 | End: 2022-10-14

## 2022-10-12 RX ORDER — LIDOCAINE 4 G/100G
1 CREAM TOPICAL EVERY 24 HOURS
Refills: 0 | Status: DISCONTINUED | OUTPATIENT
Start: 2022-10-12 | End: 2022-10-14

## 2022-10-12 RX ORDER — HYDROXYUREA 500 MG/1
500 CAPSULE ORAL DAILY
Refills: 0 | Status: DISCONTINUED | OUTPATIENT
Start: 2022-10-12 | End: 2022-10-14

## 2022-10-12 RX ORDER — DIVALPROEX SODIUM 500 MG/1
500 TABLET, DELAYED RELEASE ORAL AT BEDTIME
Refills: 0 | Status: DISCONTINUED | OUTPATIENT
Start: 2022-10-12 | End: 2022-10-14

## 2022-10-12 RX ORDER — METOPROLOL TARTRATE 50 MG
12.5 TABLET ORAL
Refills: 0 | Status: DISCONTINUED | OUTPATIENT
Start: 2022-10-12 | End: 2022-10-14

## 2022-10-12 RX ORDER — ACETAMINOPHEN 500 MG
650 TABLET ORAL EVERY 6 HOURS
Refills: 0 | Status: DISCONTINUED | OUTPATIENT
Start: 2022-10-12 | End: 2022-10-12

## 2022-10-12 RX ORDER — OLANZAPINE 15 MG/1
15 TABLET, FILM COATED ORAL AT BEDTIME
Refills: 0 | Status: DISCONTINUED | OUTPATIENT
Start: 2022-10-12 | End: 2022-10-14

## 2022-10-12 RX ORDER — TIOTROPIUM BROMIDE 18 UG/1
1 CAPSULE ORAL; RESPIRATORY (INHALATION) DAILY
Refills: 0 | Status: DISCONTINUED | OUTPATIENT
Start: 2022-10-12 | End: 2022-10-14

## 2022-10-12 RX ADMIN — Medication 10 MILLIGRAM(S): at 11:08

## 2022-10-12 RX ADMIN — ALBUTEROL 2 PUFF(S): 90 AEROSOL, METERED ORAL at 15:08

## 2022-10-12 RX ADMIN — ATORVASTATIN CALCIUM 10 MILLIGRAM(S): 80 TABLET, FILM COATED ORAL at 21:19

## 2022-10-12 RX ADMIN — TIOTROPIUM BROMIDE 1 CAPSULE(S): 18 CAPSULE ORAL; RESPIRATORY (INHALATION) at 08:40

## 2022-10-12 RX ADMIN — AMLODIPINE BESYLATE 2.5 MILLIGRAM(S): 2.5 TABLET ORAL at 05:56

## 2022-10-12 RX ADMIN — ALBUTEROL 2 PUFF(S): 90 AEROSOL, METERED ORAL at 08:39

## 2022-10-12 RX ADMIN — Medication 40 MILLIGRAM(S): at 11:09

## 2022-10-12 RX ADMIN — DIVALPROEX SODIUM 500 MILLIGRAM(S): 500 TABLET, DELAYED RELEASE ORAL at 21:18

## 2022-10-12 RX ADMIN — Medication 12.5 MILLIGRAM(S): at 21:21

## 2022-10-12 RX ADMIN — Medication 12.5 MILLIGRAM(S): at 11:08

## 2022-10-12 RX ADMIN — PANTOPRAZOLE SODIUM 40 MILLIGRAM(S): 20 TABLET, DELAYED RELEASE ORAL at 11:08

## 2022-10-12 RX ADMIN — ALBUTEROL 2 PUFF(S): 90 AEROSOL, METERED ORAL at 19:55

## 2022-10-12 RX ADMIN — AMLODIPINE BESYLATE 2.5 MILLIGRAM(S): 2.5 TABLET ORAL at 11:08

## 2022-10-12 RX ADMIN — ALBUTEROL 2 PUFF(S): 90 AEROSOL, METERED ORAL at 12:26

## 2022-10-12 RX ADMIN — Medication 81 MILLIGRAM(S): at 11:08

## 2022-10-12 RX ADMIN — Medication 50 MILLIGRAM(S): at 21:21

## 2022-10-12 RX ADMIN — Medication 40 MILLIGRAM(S): at 23:29

## 2022-10-12 RX ADMIN — Medication 40 MILLIGRAM(S): at 17:15

## 2022-10-12 RX ADMIN — GABAPENTIN 300 MILLIGRAM(S): 400 CAPSULE ORAL at 11:08

## 2022-10-12 RX ADMIN — OLANZAPINE 15 MILLIGRAM(S): 15 TABLET, FILM COATED ORAL at 21:18

## 2022-10-12 RX ADMIN — Medication 40 MILLIGRAM(S): at 05:47

## 2022-10-12 RX ADMIN — HYDROXYUREA 500 MILLIGRAM(S): 500 CAPSULE ORAL at 19:17

## 2022-10-12 NOTE — CONSULT NOTE ADULT - SUBJECTIVE AND OBJECTIVE BOX
HPI:  76 y/o F w/ PMH of bipolar disorder, COPD, depression, HTN, dyslipidemia, p/w SOB. States SOB started 3-4 days ago, and progressively getting worse. SOB feels like wheezing, and is there even at rest. Associated w/ dry cough. However, denies fever / chills / CP / nausea / vomiting / abdominal pain. States she currently smokes 1ppd     No h.o. asthma known.     10/12:  in bed, no distress, on 3 L/min NC O2, has cough, not bringing up sputum, has wheezing.       PSH: hysterectomy, hip replacement     Social Hx: Tobacco - 1ppd, etoh / drugs - denies     Family Hx: Daughter- breast cancer  (12 Oct 2022 05:29)      PAST MEDICAL & SURGICAL HISTORY:  Depression      Bipolar 1 disorder      S/P hysterectomy          MEDICATIONS  (STANDING):  ALBUTerol    90 MICROgram(s) HFA Inhaler 2 Puff(s) Inhalation every 4 hours  amLODIPine   Tablet 2.5 milliGRAM(s) Oral daily  aspirin enteric coated 81 milliGRAM(s) Oral daily  atorvastatin 10 milliGRAM(s) Oral at bedtime  diVALproex  milliGRAM(s) Oral at bedtime  FLUoxetine 10 milliGRAM(s) Oral daily  gabapentin 300 milliGRAM(s) Oral daily  hydroxyurea 500 milliGRAM(s) Oral daily  methylPREDNISolone sodium succinate Injectable 40 milliGRAM(s) IV Push every 6 hours  metoprolol tartrate 12.5 milliGRAM(s) Oral two times a day  OLANZapine 15 milliGRAM(s) Oral at bedtime  pantoprazole    Tablet 40 milliGRAM(s) Oral before breakfast  tiotropium 18 MICROgram(s) Capsule 1 Capsule(s) Inhalation daily  traZODone 50 milliGRAM(s) Oral at bedtime    MEDICATIONS  (PRN):  acetaminophen     Tablet .. 650 milliGRAM(s) Oral every 6 hours PRN Mild Pain (1 - 3)  cyclobenzaprine 10 milliGRAM(s) Oral two times a day PRN Muscle Spasm  lidocaine   4% Patch 1 Patch Transdermal every 24 hours PRN pain      Allergies    No Known Allergies    Intolerances        SOCIAL HISTORY: Denies tobacco, etoh abuse or illicit drug use    FAMILY HISTORY:  No pertinent family history in first degree relatives        Vital Signs Last 24 Hrs  T(C): 36.4 (12 Oct 2022 08:09), Max: 36.8 (11 Oct 2022 14:20)  T(F): 97.5 (12 Oct 2022 08:09), Max: 98.2 (11 Oct 2022 14:20)  HR: 115 (12 Oct 2022 08:47) (107 - 128)  BP: 141/75 (12 Oct 2022 08:09) (128/94 - 171/98)  BP(mean): 101 (11 Oct 2022 21:42) (101 - 101)  RR: 18 (12 Oct 2022 08:09) (18 - 26)  SpO2: 98% (12 Oct 2022 08:47) (79% - 100%)    Parameters below as of 12 Oct 2022 08:47  Patient On (Oxygen Delivery Method): nasal cannula,3l        REVIEW OF SYSTEMS:    CONSTITUTIONAL:  As per HPI.  HEENT:  Eyes:  No diplopia or blurred vision. ENT:  No earache, sore throat or runny nose.  CARDIOVASCULAR:  No pressure, squeezing, tightness, heaviness or aching about the chest, neck, axilla or epigastrium.  RESPIRATORY:  see above.  GASTROINTESTINAL:  No nausea, vomiting or diarrhea.  GENITOURINARY:  No dysuria, frequency or urgency.  MUSCULOSKELETAL:  As per HPI.  SKIN:  No change in skin, hair or nails.  NEUROLOGIC:  No paresthesias, fasciculations, seizures or weakness.  PSYCHIATRIC:  No disorder of thought or mood.  ENDOCRINE:  No heat or cold intolerance, polyuria or polydipsia.  HEMATOLOGICAL:  No easy bruising or bleedings:  .     PHYSICAL EXAMINATION:    GENERAL APPEARANCE:  Pt. is not currently dyspneic, in no distress. Pt. is alert, oriented, and pleasant.  HEENT:  Pupils are normal and react normally. No icterus. Mucous membranes well colored.  NECK:  Supple. No lymphadenopathy. Jugular venous pressure not elevated. Carotids equal.   HEART:   The cardiac impulse has a normal quality. Regular. Normal S1 and S2. There are no murmurs, rubs or gallops noted  CHEST:  Positive expiratory wheezes.  ABDOMEN:  Soft and nontender.   EXTREMITIES:  There is no cyanosis, clubbing or edema.   SKIN:  No rash or significant lesions are noted.  Neuro: Alert, awake, and O x 3.      LABS:                        15.9   9.97  )-----------( 340      ( 12 Oct 2022 07:52 )             45.8     10-12    132<L>  |  98  |  12  ----------------------------<  122<H>  4.4   |  31  |  0.48<L>    Ca    9.0      12 Oct 2022 07:52  Mg     2.0     10-11    TPro  7.5  /  Alb  3.4  /  TBili  0.3  /  DBili  x   /  AST  15  /  ALT  20  /  AlkPhos  46  10-12    LIVER FUNCTIONS - ( 12 Oct 2022 07:52 )  Alb: 3.4 g/dL / Pro: 7.5 gm/dL / ALK PHOS: 46 U/L / ALT: 20 U/L / AST: 15 U/L / GGT: x           PT/INR - ( 12 Oct 2022 07:52 )   PT: 11.1 sec;   INR: 0.96 ratio         PTT - ( 12 Oct 2022 07:52 )  PTT:29.0 sec      Urinalysis Basic - ( 11 Oct 2022 15:45 )    Color: Yellow / Appearance: Clear / S.005 / pH: x  Gluc: x / Ketone: Negative  / Bili: Negative / Urobili: Negative   Blood: x / Protein: Negative / Nitrite: Negative   Leuk Esterase: Moderate / RBC: 0-2 /HPF / WBC 3-5   Sq Epi: x / Non Sq Epi: Negative / Bacteria: Occasional          RADIOLOGY & ADDITIONAL STUDIES:     ACC: [id number] EXAM:  XR CHEST PORTABLE IMMED 1V                          PROCEDURE DATE:  10/11/2022          INTERPRETATION:  AP chest on 2022 at 3:29 PM. Patient has   chest pain.    Heart size is within normal limits.    Lungs are clear.    Chest is similar to  this year.    IMPRESSION: Negative chest.

## 2022-10-12 NOTE — PROGRESS NOTE ADULT - ASSESSMENT
78 y/o F w/ PMH of bipolar disorder, COPD, depression, HTN, dyslipidemia, p/w SOB    *COPD Exacerbation 2/2 RSV infection with associated Acute Respiratory Failure with Hypoxia  -Albuterol / Spiriva  -Steroids  -Pulm consult  -Pulse ox monitoring   -Contact Isolation   -O2 supplementation    *Tobacco cessation counselling  -Patient declines nicoderm     *Bipolar disorder / depression / HTN / dyslipidemia  -C/w home meds and f/u outpatient for further management if conditions remain stable during hospitalization     *DVT ppx   -SCDs

## 2022-10-12 NOTE — PATIENT PROFILE ADULT - FALL HARM RISK - UNIVERSAL INTERVENTIONS
Bed in lowest position, wheels locked, appropriate side rails in place/Call bell, personal items and telephone in reach/Instruct patient to call for assistance before getting out of bed or chair/Non-slip footwear when patient is out of bed/Cook to call system/Physically safe environment - no spills, clutter or unnecessary equipment/Purposeful Proactive Rounding/Room/bathroom lighting operational, light cord in reach

## 2022-10-12 NOTE — H&P ADULT - ASSESSMENT
78 y/o F w/ PMH of bipolar disorder, COPD, depression, HTN, dyslipidemia, p/w SOB    *COPD Exacerbation 2/2 RSV infection  -Albuterol / Spiriva  -Steroids  -Pulm consult  -Pulse ox monitoring   -Contact Isolation     *Tobacco cessation counselling  -Patient declines nicoderm     *Bipolar disorder / depression / HTN / dyslipidemia  -C/w home meds and f/u outpatient for further management if conditions remain stable during hospitalization     *DVT ppx   -SCDs

## 2022-10-12 NOTE — H&P ADULT - HISTORY OF PRESENT ILLNESS
78 y/o F w/ PMH of bipolar disorder, COPD, depression, HTN, dyslipidemia, p/w SOB. States SOB started 3-4 days ago, and progressively getting worse. SOB feels like wheezing, and is there even at rest. Associated w/ dry cough. However, denies fever / chills / CP / nausea / vomiting / abdominal pain. States she currently smokes 1ppd       PSH: hysterectomy, hip replacement     Social Hx: Tobacco - 1ppd, etoh / drugs - denies     Family Hx: Daughter- breast cancer

## 2022-10-12 NOTE — PROGRESS NOTE ADULT - SUBJECTIVE AND OBJECTIVE BOX
CHIEF COMPLAINT: SOB/Cough    SUBJECTIVE: Cough and SOB improved. Denies fever, chills, chest pain, nausea, vomiting, abdominal pain/discomfort,    SIGNIFICANT INTERVAL EVENTS/OVERNIGHT EVENTS: None    Review of Systems: 14 Point review of systems reviewed and reported as negative unless otherwise stated in HPI    FROM H&P:  "78 y/o F w/ PMH of bipolar disorder, COPD, depression, HTN, dyslipidemia, p/w SOB. States SOB started 3-4 days ago, and progressively getting worse. SOB feels like wheezing, and is there even at rest. Associated w/ dry cough. However, denies fever / chills / CP / nausea / vomiting / abdominal pain. States she currently smokes 1ppd "    PHYSICAL EXAM:    T(C): 36.4 (10-12-22 @ 15:30), Max: 36.7 (10-12-22 @ 03:28)  HR: 105 (10-12-22 @ 21:18) (104 - 128)  BP: 150/80 (10-12-22 @ 21:18) (136/77 - 171/98)  RR: 18 (10-12-22 @ 15:30) (18 - 24)  SpO2: 94% (10-12-22 @ 21:18) (79% - 99%)    General: AAOx3; NAD  Head: AT/NC  ENT: Moist Mucous Membranes; No Injury  Eyes: EOMI; PERRL  Neck: Non-tender; No JVD  CVS: RRR, S1&S2, No murmur, No edema  Respiratory: Bilateral wheezing; Normal respiratory effort;l Respiratory support with NC  Abdomen/GI: Soft, non-tender, non-distended, no guarding, no rebound, normal bowel sounds  : No bladder distention, No Espinal  Extremities: No cyanosis, No clubbing, No edema  MSK: No CVA tenderness, Normal ROM, No injury  Neuro: AAOx3, CNII-XII grossly intact, non-focal  Psych: Appropriate, Cooperative, No depression, No anxiety  Skin: Clean, Dry and Intact      LABS:                          15.9   9.97  )-----------( 340      ( 12 Oct 2022 07:52 )             45.8     10-12    132<L>  |  98  |  12  ----------------------------<  122<H>  4.4   |  31  |  0.48<L>    Ca    9.0      12 Oct 2022 07:52  Mg     2.0     10-11    TPro  7.5  /  Alb  3.4  /  TBili  0.3  /  DBili  x   /  AST  15  /  ALT  20  /  AlkPhos  46  10-12    SARS-CoV-2: NotDetec (11 Oct 2022 16:41)  COVID-19 PCR: NotDete (16 Aug 2022 10:57)    CAPILLARY BLOOD GLUCOSE        Lipid Profile (10.12.22 @ 07:52)   Cholesterol, Serum: 197 mg/dL   Triglycerides, Serum: 66 mg/dL   HDL Cholesterol, Serum: 97 mg/dL   Non HDL Cholesterol: 100: A1C with Estimated Average Glucose Result: 5.4: Respiratory Viral Panel with COVID-19 by SOFIE (10.11.22 @ 16:41)   Rapid RVP Result: Detected   SARS-CoV-2: [facility]: Serum Pro-Brain Natriuretic Peptide: 221 pg/mL (10.11.22 @ 15:41) Troponin I, High Sensitivity Result: 47.05:       RADIOLOGY:  < from: Xray Chest 1 View-PORTABLE IMMEDIATE (10.11.22 @ 15:34) >      INTERPRETATION:  AP chest on October 11, 2022 at 3:29 PM. Patient has   chest pain.    Heart size is within normal limits.    Lungs are clear.    Chest is similar to February 14 this year.    IMPRESSION: Negative chest.    < end of copied text >      EKG:  < from: 12 Lead ECG (10.12.22 @ 08:15) >  Diagnosis Line Sinus tachycardia  Right atrial enlargement  Borderline ECG  When compared with ECG of 11-OCT-2022 14:31,  T wave inversion no longer evident in Anterior leads    < end of copied text >            I personally reviewed labs, imaging, ekg, orders and vitals.    Discussed case with:  [X]RN  [X]CM/SW  [X]Patient  [X]Family: Son-in-law over phone  [X]Specialist: Pulmonology        MEDICATIONS  (STANDING):  ALBUTerol    90 MICROgram(s) HFA Inhaler 2 Puff(s) Inhalation every 4 hours  amLODIPine   Tablet 2.5 milliGRAM(s) Oral daily  aspirin enteric coated 81 milliGRAM(s) Oral daily  atorvastatin 10 milliGRAM(s) Oral at bedtime  diVALproex  milliGRAM(s) Oral at bedtime  FLUoxetine 10 milliGRAM(s) Oral daily  gabapentin 300 milliGRAM(s) Oral daily  hydroxyurea 500 milliGRAM(s) Oral daily  methylPREDNISolone sodium succinate Injectable 40 milliGRAM(s) IV Push every 6 hours  metoprolol tartrate 12.5 milliGRAM(s) Oral two times a day  OLANZapine 15 milliGRAM(s) Oral at bedtime  pantoprazole    Tablet 40 milliGRAM(s) Oral before breakfast  tiotropium 18 MICROgram(s) Capsule 1 Capsule(s) Inhalation daily  traZODone 50 milliGRAM(s) Oral at bedtime    MEDICATIONS  (PRN):  acetaminophen     Tablet .. 650 milliGRAM(s) Oral every 6 hours PRN Mild Pain (1 - 3)  cyclobenzaprine 10 milliGRAM(s) Oral two times a day PRN Muscle Spasm  lidocaine   4% Patch 1 Patch Transdermal every 24 hours PRN pain

## 2022-10-12 NOTE — CONSULT NOTE ADULT - ASSESSMENT
NC O2 as needed.  Albuterol, Spiriva inhalers.  Consider adding Symbicort 160 strength 1 puff q 12 hrs.  Solumedrol 40 mg q 6 hrs.     Counseled on cessation of cigarette smoking.

## 2022-10-13 LAB
ANION GAP SERPL CALC-SCNC: 5 MMOL/L — SIGNIFICANT CHANGE UP (ref 5–17)
BUN SERPL-MCNC: 21 MG/DL — SIGNIFICANT CHANGE UP (ref 7–23)
CALCIUM SERPL-MCNC: 9.2 MG/DL — SIGNIFICANT CHANGE UP (ref 8.5–10.1)
CHLORIDE SERPL-SCNC: 99 MMOL/L — SIGNIFICANT CHANGE UP (ref 96–108)
CO2 SERPL-SCNC: 30 MMOL/L — SIGNIFICANT CHANGE UP (ref 22–31)
CREAT SERPL-MCNC: 0.54 MG/DL — SIGNIFICANT CHANGE UP (ref 0.5–1.3)
D DIMER BLD IA.RAPID-MCNC: <150 NG/ML DDU — SIGNIFICANT CHANGE UP
EGFR: 94 ML/MIN/1.73M2 — SIGNIFICANT CHANGE UP
GLUCOSE SERPL-MCNC: 128 MG/DL — HIGH (ref 70–99)
HCT VFR BLD CALC: 44.6 % — SIGNIFICANT CHANGE UP (ref 34.5–45)
HGB BLD-MCNC: 15.3 G/DL — SIGNIFICANT CHANGE UP (ref 11.5–15.5)
MAGNESIUM SERPL-MCNC: 2.4 MG/DL — SIGNIFICANT CHANGE UP (ref 1.6–2.6)
MCHC RBC-ENTMCNC: 34.3 GM/DL — SIGNIFICANT CHANGE UP (ref 32–36)
MCHC RBC-ENTMCNC: 36.5 PG — HIGH (ref 27–34)
MCV RBC AUTO: 106.4 FL — HIGH (ref 80–100)
PLATELET # BLD AUTO: 340 K/UL — SIGNIFICANT CHANGE UP (ref 150–400)
POTASSIUM SERPL-MCNC: 4.6 MMOL/L — SIGNIFICANT CHANGE UP (ref 3.5–5.3)
POTASSIUM SERPL-SCNC: 4.6 MMOL/L — SIGNIFICANT CHANGE UP (ref 3.5–5.3)
RBC # BLD: 4.19 M/UL — SIGNIFICANT CHANGE UP (ref 3.8–5.2)
RBC # FLD: 14 % — SIGNIFICANT CHANGE UP (ref 10.3–14.5)
SODIUM SERPL-SCNC: 134 MMOL/L — LOW (ref 135–145)
WBC # BLD: 9.48 K/UL — SIGNIFICANT CHANGE UP (ref 3.8–10.5)
WBC # FLD AUTO: 9.48 K/UL — SIGNIFICANT CHANGE UP (ref 3.8–10.5)

## 2022-10-13 PROCEDURE — 99232 SBSQ HOSP IP/OBS MODERATE 35: CPT

## 2022-10-13 PROCEDURE — 93010 ELECTROCARDIOGRAM REPORT: CPT

## 2022-10-13 RX ORDER — SENNA PLUS 8.6 MG/1
2 TABLET ORAL ONCE
Refills: 0 | Status: COMPLETED | OUTPATIENT
Start: 2022-10-13 | End: 2022-10-13

## 2022-10-13 RX ADMIN — Medication 40 MILLIGRAM(S): at 05:19

## 2022-10-13 RX ADMIN — DIVALPROEX SODIUM 500 MILLIGRAM(S): 500 TABLET, DELAYED RELEASE ORAL at 22:04

## 2022-10-13 RX ADMIN — ALBUTEROL 2 PUFF(S): 90 AEROSOL, METERED ORAL at 08:48

## 2022-10-13 RX ADMIN — Medication 12.5 MILLIGRAM(S): at 09:24

## 2022-10-13 RX ADMIN — ENOXAPARIN SODIUM 40 MILLIGRAM(S): 100 INJECTION SUBCUTANEOUS at 05:18

## 2022-10-13 RX ADMIN — Medication 50 MILLIGRAM(S): at 22:05

## 2022-10-13 RX ADMIN — PANTOPRAZOLE SODIUM 40 MILLIGRAM(S): 20 TABLET, DELAYED RELEASE ORAL at 05:18

## 2022-10-13 RX ADMIN — SENNA PLUS 2 TABLET(S): 8.6 TABLET ORAL at 22:28

## 2022-10-13 RX ADMIN — Medication 81 MILLIGRAM(S): at 09:24

## 2022-10-13 RX ADMIN — OLANZAPINE 15 MILLIGRAM(S): 15 TABLET, FILM COATED ORAL at 22:04

## 2022-10-13 RX ADMIN — ALBUTEROL 2 PUFF(S): 90 AEROSOL, METERED ORAL at 16:49

## 2022-10-13 RX ADMIN — AMLODIPINE BESYLATE 2.5 MILLIGRAM(S): 2.5 TABLET ORAL at 09:24

## 2022-10-13 RX ADMIN — ALBUTEROL 2 PUFF(S): 90 AEROSOL, METERED ORAL at 20:24

## 2022-10-13 RX ADMIN — GABAPENTIN 300 MILLIGRAM(S): 400 CAPSULE ORAL at 09:24

## 2022-10-13 RX ADMIN — ALBUTEROL 2 PUFF(S): 90 AEROSOL, METERED ORAL at 00:51

## 2022-10-13 RX ADMIN — HYDROXYUREA 500 MILLIGRAM(S): 500 CAPSULE ORAL at 10:38

## 2022-10-13 RX ADMIN — Medication 40 MILLIGRAM(S): at 22:04

## 2022-10-13 RX ADMIN — ALBUTEROL 2 PUFF(S): 90 AEROSOL, METERED ORAL at 11:43

## 2022-10-13 RX ADMIN — Medication 12.5 MILLIGRAM(S): at 22:04

## 2022-10-13 RX ADMIN — ALBUTEROL 2 PUFF(S): 90 AEROSOL, METERED ORAL at 23:22

## 2022-10-13 RX ADMIN — ATORVASTATIN CALCIUM 10 MILLIGRAM(S): 80 TABLET, FILM COATED ORAL at 22:05

## 2022-10-13 RX ADMIN — TIOTROPIUM BROMIDE 1 CAPSULE(S): 18 CAPSULE ORAL; RESPIRATORY (INHALATION) at 08:47

## 2022-10-13 RX ADMIN — Medication 10 MILLIGRAM(S): at 09:24

## 2022-10-13 RX ADMIN — Medication 40 MILLIGRAM(S): at 13:55

## 2022-10-13 NOTE — PROGRESS NOTE ADULT - SUBJECTIVE AND OBJECTIVE BOX
CHIEF COMPLAINT: SOB/Cough    SUBJECTIVE: Cough and SOB improved. Denies fever, chills, chest pain, nausea, vomiting, abdominal pain/discomfort,    SIGNIFICANT INTERVAL EVENTS/OVERNIGHT EVENTS: None    Review of Systems: 14 Point review of systems reviewed and reported as negative unless otherwise stated in HPI    FROM H&P:  "76 y/o F w/ PMH of bipolar disorder, COPD, depression, HTN, dyslipidemia, p/w SOB. States SOB started 3-4 days ago, and progressively getting worse. SOB feels like wheezing, and is there even at rest. Associated w/ dry cough. However, denies fever / chills / CP / nausea / vomiting / abdominal pain. States she currently smokes 1ppd "    PHYSICAL EXAM:    T(C): 36.6 (10-13-22 @ 08:36), Max: 36.6 (10-13-22 @ 08:36)  HR: 102 (10-13-22 @ 11:44) (89 - 116)  BP: 124/70 (10-13-22 @ 08:36) (124/70 - 150/80)  RR: 18 (10-13-22 @ 08:36) (18 - 20)  SpO2: 98% (10-13-22 @ 08:36) (94% - 98%)    General: AAOx3; NAD  Head: AT/NC  ENT: Moist Mucous Membranes; No Injury  Eyes: EOMI; PERRL  Neck: Non-tender; No JVD  CVS: RRR, S1&S2, No murmur, No edema  Respiratory: Bilateral wheezing improving; Normal respiratory effort;l Respiratory support with NC  Abdomen/GI: Soft, non-tender, non-distended, no guarding, no rebound, normal bowel sounds  : No bladder distention, No Espinal  Extremities: No cyanosis, No clubbing, No edema  MSK: No CVA tenderness, Normal ROM, No injury  Neuro: AAOx3, CNII-XII grossly intact, non-focal  Psych: Appropriate, Cooperative, No depression, No anxiety  Skin: Clean, Dry and Intact      LABS:                                   15.3   9.48  )-----------( 340      ( 13 Oct 2022 06:54 )             44.6     10-13    134<L>  |  99  |  21  ----------------------------<  128<H>  4.6   |  30  |  0.54    Ca    9.2      13 Oct 2022 06:54  Mg     2.4     10-13    TPro  7.5  /  Alb  3.4  /  TBili  0.3  /  DBili  x   /  AST  15  /  ALT  20  /  AlkPhos  46  10-12    SARS-CoV-2: NotDetec (11 Oct 2022 16:41)  COVID-19 PCR: NotDetec (16 Aug 2022 10:57)    CAPILLARY BLOOD GLUCOSE                     15.9   9.97  )-----------( 340      ( 12 Oct 2022 07:52 )             45.8     10-12    132<L>  |  98  |  12  ----------------------------<  122<H>  4.4   |  31  |  0.48<L>    Ca    9.0      12 Oct 2022 07:52  Mg     2.0     10-11    TPro  7.5  /  Alb  3.4  /  TBili  0.3  /  DBili  x   /  AST  15  /  ALT  20  /  AlkPhos  46  10-12    SARS-CoV-2: NotDetec (11 Oct 2022 16:41)  COVID-19 PCR: NotDetec (16 Aug 2022 10:57)    CAPILLARY BLOOD GLUCOSE        Lipid Profile (10.12.22 @ 07:52)   Cholesterol, Serum: 197 mg/dL   Triglycerides, Serum: 66 mg/dL   HDL Cholesterol, Serum: 97 mg/dL   Non HDL Cholesterol: 100: A1C with Estimated Average Glucose Result: 5.4: Respiratory Viral Panel with COVID-19 by SOFIE (10.11.22 @ 16:41)   Rapid RVP Result: Detected   SARS-CoV-2: NotDetec: Serum Pro-Brain Natriuretic Peptide: 221 pg/mL (10.11.22 @ 15:41) Troponin I, High Sensitivity Result: 47.05:       RADIOLOGY:  < from: Xray Chest 1 View-PORTABLE IMMEDIATE (10.11.22 @ 15:34) >      INTERPRETATION:  AP chest on October 11, 2022 at 3:29 PM. Patient has   chest pain.    Heart size is within normal limits.    Lungs are clear.    Chest is similar to February 14 this year.    IMPRESSION: Negative chest.    < end of copied text >      EKG:  < from: 12 Lead ECG (10.12.22 @ 08:15) >  Diagnosis Line Sinus tachycardia  Right atrial enlargement  Borderline ECG  When compared with ECG of 11-OCT-2022 14:31,  T wave inversion no longer evident in Anterior leads    < end of copied text >            I personally reviewed labs, imaging, ekg, orders and vitals.    Discussed case with:  [X]RN  [X]CM/SAIRA  [X]Patient  [X]Family: Son-in-law over phone  [X]Specialist: Pulmonology

## 2022-10-13 NOTE — PROGRESS NOTE ADULT - ASSESSMENT
MEDICATIONS  (STANDING):  ALBUTerol    90 MICROgram(s) HFA Inhaler 2 Puff(s) Inhalation every 4 hours  amLODIPine   Tablet 2.5 milliGRAM(s) Oral daily  aspirin enteric coated 81 milliGRAM(s) Oral daily  atorvastatin 10 milliGRAM(s) Oral at bedtime  diVALproex  milliGRAM(s) Oral at bedtime  enoxaparin Injectable 40 milliGRAM(s) SubCutaneous every 24 hours  FLUoxetine 10 milliGRAM(s) Oral daily  gabapentin 300 milliGRAM(s) Oral daily  hydroxyurea 500 milliGRAM(s) Oral daily  methylPREDNISolone sodium succinate Injectable 40 milliGRAM(s) IV Push every 8 hours  metoprolol tartrate 12.5 milliGRAM(s) Oral two times a day  OLANZapine 15 milliGRAM(s) Oral at bedtime  pantoprazole    Tablet 40 milliGRAM(s) Oral before breakfast  tiotropium 18 MICROgram(s) Capsule 1 Capsule(s) Inhalation daily  traZODone 50 milliGRAM(s) Oral at bedtime    MEDICATIONS  (PRN):  acetaminophen     Tablet .. 650 milliGRAM(s) Oral every 6 hours PRN Mild Pain (1 - 3)  cyclobenzaprine 10 milliGRAM(s) Oral two times a day PRN Muscle Spasm  lidocaine   4% Patch 1 Patch Transdermal every 24 hours PRN pain    76 y/o F w/ PMH of bipolar disorder, COPD, depression, HTN, dyslipidemia, p/w SOB    *COPD Exacerbation 2/2 RSV infection with associated Acute Respiratory Failure with Hypoxia. Improving  -Albuterol / Spiriva  -Steroids. Down titrating as tolerated.   -Pulm consult  -Pulse ox monitoring   -Contact Isolation   -O2 supplementation    *Tobacco cessation counselling  -Patient declines nicoderm     *Bipolar disorder / depression / HTN / dyslipidemia  -C/w home meds and f/u outpatient for further management if conditions remain stable during hospitalization     *DVT ppx   -Lovenox subq    Disposition: Downtitrating Steroids as tolerated. Now O2 ok on RA at rest. Walking O2 in am. Patient with grandchild wedding on Saturday. If Respiratory status stable and Walking O2 shows no requirement of O2 with ambulation, possible disharge in 1-2 days on Oral steroid taper.

## 2022-10-14 ENCOUNTER — TRANSCRIPTION ENCOUNTER (OUTPATIENT)
Age: 77
End: 2022-10-14

## 2022-10-14 VITALS
DIASTOLIC BLOOD PRESSURE: 60 MMHG | TEMPERATURE: 98 F | RESPIRATION RATE: 18 BRPM | OXYGEN SATURATION: 93 % | HEART RATE: 82 BPM | SYSTOLIC BLOOD PRESSURE: 119 MMHG

## 2022-10-14 PROCEDURE — 99233 SBSQ HOSP IP/OBS HIGH 50: CPT

## 2022-10-14 PROCEDURE — 99239 HOSP IP/OBS DSCHRG MGMT >30: CPT

## 2022-10-14 RX ORDER — TIOTROPIUM BROMIDE 18 UG/1
1 CAPSULE ORAL; RESPIRATORY (INHALATION)
Qty: 30 | Refills: 0
Start: 2022-10-14

## 2022-10-14 RX ORDER — ALBUTEROL 90 UG/1
2 AEROSOL, METERED ORAL
Qty: 1 | Refills: 0
Start: 2022-10-14

## 2022-10-14 RX ORDER — NICOTINE POLACRILEX 2 MG
1 GUM BUCCAL
Qty: 30 | Refills: 0
Start: 2022-10-14 | End: 2022-11-12

## 2022-10-14 RX ORDER — METOPROLOL TARTRATE 50 MG
0.5 TABLET ORAL
Qty: 30 | Refills: 0
Start: 2022-10-14

## 2022-10-14 RX ADMIN — Medication 40 MILLIGRAM(S): at 17:12

## 2022-10-14 RX ADMIN — HYDROXYUREA 500 MILLIGRAM(S): 500 CAPSULE ORAL at 09:37

## 2022-10-14 RX ADMIN — Medication 40 MILLIGRAM(S): at 05:43

## 2022-10-14 RX ADMIN — TIOTROPIUM BROMIDE 1 CAPSULE(S): 18 CAPSULE ORAL; RESPIRATORY (INHALATION) at 08:14

## 2022-10-14 RX ADMIN — GABAPENTIN 300 MILLIGRAM(S): 400 CAPSULE ORAL at 09:35

## 2022-10-14 RX ADMIN — ENOXAPARIN SODIUM 40 MILLIGRAM(S): 100 INJECTION SUBCUTANEOUS at 05:43

## 2022-10-14 RX ADMIN — Medication 40 MILLIGRAM(S): at 16:15

## 2022-10-14 RX ADMIN — ALBUTEROL 2 PUFF(S): 90 AEROSOL, METERED ORAL at 16:57

## 2022-10-14 RX ADMIN — ALBUTEROL 2 PUFF(S): 90 AEROSOL, METERED ORAL at 13:04

## 2022-10-14 RX ADMIN — PANTOPRAZOLE SODIUM 40 MILLIGRAM(S): 20 TABLET, DELAYED RELEASE ORAL at 05:43

## 2022-10-14 RX ADMIN — AMLODIPINE BESYLATE 2.5 MILLIGRAM(S): 2.5 TABLET ORAL at 09:35

## 2022-10-14 RX ADMIN — Medication 81 MILLIGRAM(S): at 09:35

## 2022-10-14 RX ADMIN — Medication 12.5 MILLIGRAM(S): at 09:36

## 2022-10-14 RX ADMIN — Medication 10 MILLIGRAM(S): at 09:35

## 2022-10-14 RX ADMIN — ALBUTEROL 2 PUFF(S): 90 AEROSOL, METERED ORAL at 08:14

## 2022-10-14 NOTE — DISCHARGE NOTE PROVIDER - PROVIDER TOKENS
PROVIDER:[TOKEN:[[id number]],FOLLOWUP:[1 week],ESTABLISHEDPATIENT:[T]],PROVIDER:[TOKEN:[[id number]],FOLLOWUP:[2 weeks],ESTABLISHEDPATIENT:[T]]

## 2022-10-14 NOTE — DISCHARGE NOTE NURSING/CASE MANAGEMENT/SOCIAL WORK - PATIENT PORTAL LINK FT
You can access the FollowMyHealth Patient Portal offered by [facility] by registering at the following website: [email or web address]. By joining "[employer]’s FollowMyHealth portal, you will also be able to view your health information using other applications (apps) compatible with our system.

## 2022-10-14 NOTE — DISCHARGE NOTE PROVIDER - NSDCCPCAREPLAN_GEN_ALL_CORE_FT
PRINCIPAL DISCHARGE DIAGNOSIS  Diagnosis: COPD exacerbation  Assessment and Plan of Treatment: Continue steroid medication as prescribed. Recommend complete cessation of smoking/tobacco. Close outpatient follow up with primary medical doctor and pulmonologist.      SECONDARY DISCHARGE DIAGNOSES  Diagnosis: RSV infection  Assessment and Plan of Treatment: Self limiting and improving.

## 2022-10-14 NOTE — DISCHARGE NOTE PROVIDER - HOSPITAL COURSE
FROM H&P:    "78 y/o F w/ PMH of bipolar disorder, COPD, depression, HTN, dyslipidemia, p/w SOB. States SOB started 3-4 days ago, and progressively getting worse. SOB feels like wheezing, and is there even at rest. Associated w/ dry cough. However, denies fever / chills / CP / nausea / vomiting / abdominal pain. States she currently smokes 1ppd ."    78 y/o F w/ PMH of bipolar disorder, COPD, depression, HTN, dyslipidemia, p/w SOB    *COPD Exacerbation 2/2 RSV infection with associated Acute Respiratory Failure with Hypoxia. Improving  -Albuterol / Spiriva  -Steroids. Down titrating as tolerated.   -Pulm consult  -Pulse ox monitoring   -Contact Isolation   -O2 supplementation    *Tobacco cessation counselling  -Patient declines nicoderm     *Bipolar disorder / depression / HTN / dyslipidemia  -C/w home meds and f/u outpatient for further management if conditions remain stable during hospitalization     *DVT ppx   -Lovenox subq    Disposition: Downtitrating Steroids as tolerated. Now O2 ok on RA at rest. Walking O2 in am. Patient with grandchild wedding on Saturday. If Respiratory status stable and Walking O2 shows no requirement of O2 with ambulation, possible disharge in 1-2 days on Oral steroid taper. FROM H&P:    "76 y/o F w/ PMH of bipolar disorder, COPD, depression, HTN, dyslipidemia, p/w SOB. States SOB started 3-4 days ago, and progressively getting worse. SOB feels like wheezing, and is there even at rest. Associated w/ dry cough. However, denies fever / chills / CP / nausea / vomiting / abdominal pain. States she currently smokes 1ppd ."    76 y/o F w/ PMH of bipolar disorder, COPD, depression, HTN, dyslipidemia, p/w SOB    *COPD Exacerbation 2/2 RSV infection with associated Acute Respiratory Failure with Hypoxia. Improving  -Albuterol / Spiriva  -Steroids. Down titrating as tolerated.   -Pulm consult  -Pulse ox monitoring   -Contact Isolation   -O2 supplementation    *Tobacco cessation counselling  -Patient declines nicoderm     *Bipolar disorder / depression / HTN / dyslipidemia  -C/w home meds and f/u outpatient for further management if conditions remain stable during hospitalization     *DVT ppx   -Lovenox subq    Disposition: Downtitrating Steroids as tolerated->PO steroids Now O2 ok on RA at rest. Walking O2 evaluation (10/14)->does NOT require O2 supplementation with ambulation or at rest. Discharge home with steroid taper and recommending complete cessation from smoking.   T(C): 36.7 (10-14-22 @ 15:45), Max: 36.7 (10-13-22 @ 22:00)  HR: 82 (10-14-22 @ 15:45) (80 - 93)  BP: 119/60 (10-14-22 @ 15:45) (115/62 - 136/84)  RR: 18 (10-14-22 @ 15:45) (18 - 18)  SpO2: 93% (10-14-22 @ 15:45) (92% - 97%)  General: AAOx3; NAD  ENT: Moist Mucous Membranes; No Injury  CVS: RRR, S1&S2, No murmur, No edema  Respiratory: Mild end expiratory wheezing; Normal respiratory effort and saturation on RA  Abdomen/GI: Soft, non-tender, non-distended, no guarding, no rebound, normal bowel sounds  Extremities: No cyanosis, No clubbing, No edema  MSK: No CVA tenderness, Normal ROM, No injury  Neuro: AAOx3, CNII-XII grossly intact, non-focal  Discharge management: 40 minutes  Date of Discharge/Service: 10/14/2022

## 2022-10-14 NOTE — DISCHARGE NOTE PROVIDER - CARE PROVIDER_API CALL
Kimberly Pina)  Family Medicine  [address]  [place] [address]  Phone: [phone]  Fax: [phone]  Established Patient  Follow Up Time: 1 week    Yakov Ortiz)  Internal Medicine; Pulmonary Disease  [address], [address] [place] [address]  Phone: [phone]  Fax: [phone]  Established Patient  Follow Up Time: 2 weeks

## 2022-10-14 NOTE — DISCHARGE NOTE NURSING/CASE MANAGEMENT/SOCIAL WORK - NSDCPEFALRISK_GEN_ALL_CORE
For information on Fall & Injury Prevention, visit: [email or web address] OR  [email or web address] OR  https://www.cdc.gov/steadi/patient.html

## 2022-10-14 NOTE — DISCHARGE NOTE PROVIDER - NSDCFUSCHEDAPPT_GEN_ALL_CORE_FT
Franck Neff  [facility] Physician Replaced by [facility]  ORTHOSURG [address] [facility]  Scheduled Appointment: 10/19/2022    Deepthi Monroy  [facility] Physician Replaced by [facility]  [facility]  Scheduled Appointment: 10/24/2022

## 2022-10-14 NOTE — DISCHARGE NOTE PROVIDER - NSDCMRMEDTOKEN_GEN_ALL_CORE_FT
albuterol 90 mcg/inh inhalation aerosol: 2 puff(s) inhaled every 4 hours  alendronate 70 mg oral tablet: 1 tab(s) orally once a week  amLODIPine 2.5 mg oral tablet: 1 tab(s) orally once a day  Aspirin Enteric Coated 81 mg oral delayed release tablet: 1 tab(s) orally once a day  cyclobenzaprine 10 mg oral tablet: 1 tab(s) orally every 12 hours, As Needed -for muscle spasm   divalproex sodium 250 mg oral tablet, extended release: 2 tab(s) orally once a day (at bedtime)  FLUoxetine 10 mg oral capsule: 1 cap(s) orally once a day  gabapentin 300 mg oral capsule: 1 cap(s) orally once a day  hydroxyurea 500 mg oral capsule: 1 cap(s) orally once a day  Lidoderm 5% topical film: Apply topically to affected area every 12 hours, As Needed -for mild pain   metoprolol tartrate 25 mg oral tablet: 0.5 tab(s) orally every 12 hours   OLANZapine 15 mg oral tablet: 1 tab(s) orally once a day (at bedtime)  pantoprazole 40 mg oral delayed release tablet: 1 tab(s) orally once a day  pravastatin 40 mg oral tablet: 1 tab(s) orally once a day  predniSONE 5 mg oral tablet: 1 tab(s) orally once a day; as Directed: 60mg (12 tabs) qd Days 1-2, 40mg (8 tabs) qd Days 3-4, 20mg (4 tabs) qd Days 5-6, 10mg (2 tabs) qd Days 7-8. 5mg (1 tab) qd Days 9-10.   Spiriva HandiHaler 18 mcg inhalation capsule: 1 cap(s) inhaled once a day   traZODone 50 mg oral tablet: 1 tab(s) orally once a day (at bedtime)   albuterol 90 mcg/inh inhalation aerosol: 2 puff(s) inhaled every 4 hours  alendronate 70 mg oral tablet: 1 tab(s) orally once a week  amLODIPine 2.5 mg oral tablet: 1 tab(s) orally once a day  Aspirin Enteric Coated 81 mg oral delayed release tablet: 1 tab(s) orally once a day  cyclobenzaprine 10 mg oral tablet: 1 tab(s) orally every 12 hours, As Needed -for muscle spasm   divalproex sodium 250 mg oral tablet, extended release: 2 tab(s) orally once a day (at bedtime)  FLUoxetine 10 mg oral capsule: 1 cap(s) orally once a day  gabapentin 300 mg oral capsule: 1 cap(s) orally once a day  hydroxyurea 500 mg oral capsule: 1 cap(s) orally once a day  Lidoderm 5% topical film: Apply topically to affected area every 12 hours, As Needed -for mild pain   metoprolol tartrate 25 mg oral tablet: 0.5 tab(s) orally every 12 hours   nicotine 21 mg/24 hr transdermal film, extended release: 1 patch transdermally once a day   OLANZapine 15 mg oral tablet: 1 tab(s) orally once a day (at bedtime)  pantoprazole 40 mg oral delayed release tablet: 1 tab(s) orally once a day  pravastatin 40 mg oral tablet: 1 tab(s) orally once a day  predniSONE 5 mg oral tablet: 1 tab(s) orally once a day; as Directed: 60mg (12 tabs) qd Days 1-2, 40mg (8 tabs) qd Days 3-4, 20mg (4 tabs) qd Days 5-6, 10mg (2 tabs) qd Days 7-8. 5mg (1 tab) qd Days 9-10.   Spiriva HandiHaler 18 mcg inhalation capsule: 1 cap(s) inhaled once a day   traZODone 50 mg oral tablet: 1 tab(s) orally once a day (at bedtime)

## 2022-10-19 ENCOUNTER — APPOINTMENT (OUTPATIENT)
Dept: ORTHOPEDIC SURGERY | Facility: CLINIC | Age: 77
End: 2022-10-19

## 2022-10-19 VITALS
WEIGHT: 133 LBS | BODY MASS INDEX: 22.71 KG/M2 | SYSTOLIC BLOOD PRESSURE: 114 MMHG | HEIGHT: 64 IN | DIASTOLIC BLOOD PRESSURE: 76 MMHG | HEART RATE: 69 BPM

## 2022-10-19 DIAGNOSIS — I10 ESSENTIAL (PRIMARY) HYPERTENSION: ICD-10-CM

## 2022-10-19 DIAGNOSIS — B97.4 RESPIRATORY SYNCYTIAL VIRUS AS THE CAUSE OF DISEASES CLASSIFIED ELSEWHERE: ICD-10-CM

## 2022-10-19 DIAGNOSIS — Z20.822 CONTACT WITH AND (SUSPECTED) EXPOSURE TO COVID-19: ICD-10-CM

## 2022-10-19 DIAGNOSIS — E78.5 HYPERLIPIDEMIA, UNSPECIFIED: ICD-10-CM

## 2022-10-19 DIAGNOSIS — Z71.6 TOBACCO ABUSE COUNSELING: ICD-10-CM

## 2022-10-19 DIAGNOSIS — J44.1 CHRONIC OBSTRUCTIVE PULMONARY DISEASE WITH (ACUTE) EXACERBATION: ICD-10-CM

## 2022-10-19 DIAGNOSIS — F31.9 BIPOLAR DISORDER, UNSPECIFIED: ICD-10-CM

## 2022-10-19 DIAGNOSIS — Z79.82 LONG TERM (CURRENT) USE OF ASPIRIN: ICD-10-CM

## 2022-10-19 DIAGNOSIS — Z79.83 LONG TERM (CURRENT) USE OF BISPHOSPHONATES: ICD-10-CM

## 2022-10-19 DIAGNOSIS — F17.210 NICOTINE DEPENDENCE, CIGARETTES, UNCOMPLICATED: ICD-10-CM

## 2022-10-19 DIAGNOSIS — Z96.659 PRESENCE OF UNSPECIFIED ARTIFICIAL KNEE JOINT: ICD-10-CM

## 2022-10-19 DIAGNOSIS — J96.01 ACUTE RESPIRATORY FAILURE WITH HYPOXIA: ICD-10-CM

## 2022-10-19 DIAGNOSIS — Z79.52 LONG TERM (CURRENT) USE OF SYSTEMIC STEROIDS: ICD-10-CM

## 2022-10-19 DIAGNOSIS — Z90.710 ACQUIRED ABSENCE OF BOTH CERVIX AND UTERUS: ICD-10-CM

## 2022-10-19 PROCEDURE — 99214 OFFICE O/P EST MOD 30 MIN: CPT

## 2022-10-19 NOTE — ADDENDUM
[FreeTextEntry1] : This note was written by Ze Peña on 10/19/2022 acting as scribe for Dr. Franck Neff M.D.\par \par I, Franck Neff MD, have read and attest that all the information, medical decision making and discharge instructions within are true and accurate.

## 2022-10-19 NOTE — PHYSICAL EXAM
[Normal] : Gait: normal [Stooped] : stooped [Walker] : ambulates with walker [Santos's Sign] : negative Santos's sign [Pronator Drift] : negative pronator drift [SLR] : negative straight leg raise [de-identified] : 5 out of 5 motor strength, sensation is intact and symmetrical full range of motion flexion extension and rotation, no palpatory tenderness full range of motion of hips knees shoulders and elbows (all four extremities), no atrophy, negative straight leg raise, no pathological reflexes, no swelling, normal ambulation, no apparent distress skin is intact, no palpable lymph nodes, no upper or lower extremity instability, alert and oriented x3 and normal mood. Normal finger-to nose test. Restriction left hip more than right.  [de-identified] : I reviewed, interpreted and clinically correlated the following outside imaging studies, \par MRI Lumbar 9/26/22 - severe stenosis L2-3/34\par moderate to severe L4-5\par \par XR AP Lat Lumbar 08/25/2022-severe right hip arthritis-reviewed with patient. \par \par XR AP Lat Lumbar 09/13/2021 - Severe spinal stenosis present in lumbar region. She also has severe bone-on-bone articulation bilateral hips-reviewed with patient and family member.\par \par MRI-3T LUMBAR SPINE NON CONTRAST    09/16/2021   ([facility]) \par \par Findings: Vertebral bodies are normal in height without a fracture or pars\par defect. Signal arising from bone marrow does not demonstrate an aggressive\par osseous lesion or infiltrative process. Bone marrow edema along the superior L3\par and lesser degree inferior L2 endplates are new since the prior study indicative\par prominent type I Modic changes. There is a hemangioma within L5 which is stable\par since the prior exam. The lower cord and conus are normal in signal and\par morphology.\par The T11-T12 is again a moderate broad-based posterior disc protrusion\par superimposed on disc osteophytic ridging. Facet joints are moderately\par degenerated. There is moderate thecal sac compression and mild compression of\par the anterior margin of the spinal cord.\par At L1-2, there is a small circumferential disc osteophytic ridge, stable.\par There is mild to moderate facet arthrosis. There is mild thecal sac\par compression. There is mild bilateral foraminal stenosis.\par At L2-3, there is moderate circumferential disc bulge which has progressed\par since the prior study. There is mild osteophytic ridging. There is moderate\par facet arthrosis. There is severe thecal sac compression. There is impingement\par of the proximal cauda equina is slightly buckled proximally. There is moderate\par to severe bilateral foraminal stenosis. There is mild impingement of exiting\par nerve roots.\par At L3-4, There is moderate circumferential disc osteophytic ridge which extends\par into the neural foramen bilaterally. There is moderate facet arthrosis. There\par is ligamentum flavum thickening. There is severe thecal sac compression. There\par is near complete effacement of CSF. There is severe left foraminal stenosis\par with compression of the exiting nerve root. There is moderate right foraminal\par stenosis with abutment of the exiting nerve root. Findings are stable.\par At L4-5, There is moderate circumferential disc osteophytic ridge which extends \par into the neural foramen bilaterally. There is severe facet arthrosis. There is\par prominent ligamentum flavum thickening. There is severe thecal sac\par compression. There is impingement of L5 nerve root origins. There is severe\par left foraminal stenosis with compression of the exiting nerve root. There is\par moderate to severe right foraminal stenosis with mild impingement of exiting\par nerve root.\par At L5-S1, There is moderate circumferential disc osteophytic ridge which\par extends into the neural foramen bilaterally. There is moderate to severe facet\par arthrosis. There is mild thecal sac compression. There is severe bilateral\par foraminal stenosis. There is compression of the exiting nerve roots.\par Paraspinal musculature is symmetric. There is no bulky lymphadenopathy.\par Impression:\par Prominent degenerative disc disease and facet arthrosis throughout the lumbar\par spine as well as at the lower thoracic levels.\par Degenerative changes at L2-L3 have progressed since the prior exam and there are\par prominent type I Modic changes at the superior L3 and lesser degree inferior L2\par endplates. There is severe thecal sac compression with near complete effacement\par of CSF and impingement of the cauda equina. There is moderate to severe\par foraminal stenosis.\par At L3-L4 there is severe thecal sac compression. There is severe left and\par moderate right foraminal stenosis.\par At L4-L5 there is severe thecal sac compression. There is severe left and\par moderate to severe right foraminal stenosis.\par At L5-S1 there is severe bilateral foraminal stenosis. \par \par \par XR AP Lat Lumbar 09/13/2021 - lumbar degenerative disc disease, hip arthritis -reviewed with patient.

## 2022-10-19 NOTE — DISCUSSION/SUMMARY
[Medication Risks Reviewed] : Medication risks reviewed [Surgical risks reviewed] : Surgical risks reviewed [de-identified] : Lumbago.\par Right hip arthritis.\par Left hip replacement.\par L2-5 severe stenosis.\par COPD\par +nicotine.\par Discussed all options. \par MDPx2.\par Risks of surgery include infection, dural tear, nerve root injury, reherniation, future back pain, future leg pain, retained fragment, hematoma, urinary retention, worsening leg symptoms, footdrop, anesthetic risks, blood transfusion risks, positioning pain, visceral and vascular injury, deep vein thrombosis, pulmonary embolus, and death. All risks were explained not exclusive to the ones mentioned alternatives were discussed and all questions were answered the patient agrees and understands the above and is in complete agreement with the plan. \par If no better, send to Dr. Aden Ribera for pain management left SNRB.\par All options discussed including rest, medicine, home exercise, acupuncture, Chiropractic care, Physical Therapy, Pain management, and last resort surgery. All questions were answered, all alternatives discussed and the patient is in complete agreement with that plan. Follow-up appointment as instructed. Any issues and the patient will call or come in sooner. \par Here with family member.
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
## 2022-10-20 ENCOUNTER — OUTPATIENT (OUTPATIENT)
Dept: OUTPATIENT SERVICES | Facility: HOSPITAL | Age: 77
LOS: 1 days | Discharge: HOME | End: 2022-10-20

## 2022-10-20 DIAGNOSIS — C50.911 MALIGNANT NEOPLASM OF UNSPECIFIED SITE OF RIGHT FEMALE BREAST: ICD-10-CM

## 2022-10-20 DIAGNOSIS — Z90.710 ACQUIRED ABSENCE OF BOTH CERVIX AND UTERUS: Chronic | ICD-10-CM

## 2022-10-21 RX ORDER — PRAVASTATIN SODIUM 40 MG/1
40 TABLET ORAL
Qty: 90 | Refills: 0 | Status: ACTIVE | COMMUNITY
Start: 2022-10-05

## 2022-10-21 RX ORDER — METHYLPREDNISOLONE 4 MG/1
4 TABLET ORAL
Qty: 21 | Refills: 0 | Status: DISCONTINUED | COMMUNITY
Start: 2022-09-22 | End: 2022-10-21

## 2022-10-21 RX ORDER — FLUOXETINE HYDROCHLORIDE 10 MG/1
10 TABLET ORAL
Qty: 30 | Refills: 0 | Status: ACTIVE | COMMUNITY
Start: 2022-09-23

## 2022-10-21 RX ORDER — PREDNISONE 20 MG/1
20 TABLET ORAL
Qty: 10 | Refills: 0 | Status: DISCONTINUED | COMMUNITY
Start: 2022-10-05 | End: 2022-10-21

## 2022-10-24 ENCOUNTER — RESULT REVIEW (OUTPATIENT)
Age: 77
End: 2022-10-24

## 2022-10-24 ENCOUNTER — APPOINTMENT (OUTPATIENT)
Dept: HEMATOLOGY ONCOLOGY | Facility: CLINIC | Age: 77
End: 2022-10-24

## 2022-10-24 VITALS
BODY MASS INDEX: 22.16 KG/M2 | HEART RATE: 71 BPM | DIASTOLIC BLOOD PRESSURE: 64 MMHG | WEIGHT: 129.13 LBS | TEMPERATURE: 97.1 F | RESPIRATION RATE: 17 BRPM | SYSTOLIC BLOOD PRESSURE: 109 MMHG

## 2022-10-24 LAB
BASOPHILS # BLD AUTO: 0.06 K/UL — SIGNIFICANT CHANGE UP (ref 0–0.2)
BASOPHILS NFR BLD AUTO: 0.5 % — SIGNIFICANT CHANGE UP (ref 0–2)
EOSINOPHIL # BLD AUTO: 0.06 K/UL — SIGNIFICANT CHANGE UP (ref 0–0.5)
EOSINOPHIL NFR BLD AUTO: 0.5 % — SIGNIFICANT CHANGE UP (ref 0–6)
HCT VFR BLD CALC: 42 % — SIGNIFICANT CHANGE UP (ref 34.5–45)
HGB BLD-MCNC: 14.5 G/DL — SIGNIFICANT CHANGE UP (ref 11.5–15.5)
IMM GRANULOCYTES NFR BLD AUTO: 0.8 % — SIGNIFICANT CHANGE UP (ref 0–0.9)
LYMPHOCYTES # BLD AUTO: 2.49 K/UL — SIGNIFICANT CHANGE UP (ref 1–3.3)
LYMPHOCYTES # BLD AUTO: 20.6 % — SIGNIFICANT CHANGE UP (ref 13–44)
MCHC RBC-ENTMCNC: 34.5 GM/DL — SIGNIFICANT CHANGE UP (ref 32–36)
MCHC RBC-ENTMCNC: 35.9 PG — HIGH (ref 27–34)
MCV RBC AUTO: 104 FL — HIGH (ref 80–100)
MONOCYTES # BLD AUTO: 1.13 K/UL — HIGH (ref 0–0.9)
MONOCYTES NFR BLD AUTO: 9.4 % — SIGNIFICANT CHANGE UP (ref 2–14)
NEUTROPHILS # BLD AUTO: 8.22 K/UL — HIGH (ref 1.8–7.4)
NEUTROPHILS NFR BLD AUTO: 68.2 % — SIGNIFICANT CHANGE UP (ref 43–77)
NRBC # BLD: 0 /100 WBCS — SIGNIFICANT CHANGE UP (ref 0–0)
PLATELET # BLD AUTO: 379 K/UL — SIGNIFICANT CHANGE UP (ref 150–400)
RBC # BLD: 4.04 M/UL — SIGNIFICANT CHANGE UP (ref 3.8–5.2)
RBC # FLD: 13.5 % — SIGNIFICANT CHANGE UP (ref 10.3–14.5)
WBC # BLD: 12.06 K/UL — HIGH (ref 3.8–10.5)
WBC # FLD AUTO: 12.06 K/UL — HIGH (ref 3.8–10.5)

## 2022-10-24 PROCEDURE — 99213 OFFICE O/P EST LOW 20 MIN: CPT

## 2022-10-24 NOTE — REVIEW OF SYSTEMS
[Patient Intake Form Reviewed] : Patient intake form was reviewed [Recent Change In Weight] : ~T recent weight change [Joint Pain] : joint pain [Negative] : Heme/Lymph [FreeTextEntry9] : per HPI

## 2022-10-24 NOTE — PHYSICAL EXAM
[Normal] : normoactive bowel sounds, soft and nontender, no hepatosplenomegaly or masses appreciated [de-identified] : periareolar lumpectomy scar right breast, no palpable masses, left breast no masses, no axillary  adenopathy b/l

## 2022-10-24 NOTE — ASSESSMENT
[FreeTextEntry1] : 78 y/o woman with hx of a strongly ER/MS+, HER2-, right sided stage IA breast cancer, s/p lumpectomy 9/2015, and XRT. Did not tolerate adjuvant hormonal therapy.  Clinically - no evidence of recurrence. Mammogram  will be due in  June 2023 ( [facility]- goes yearly).  Will get report from this year mammo.\par \par GINO 2 positive P vera. Continue Hydrea 500 mg daily. Counts well controlled.\par \par Continue ASA.\par \par Recently quit smoking. \par \par \par Exam in 6 months .

## 2022-10-24 NOTE — HISTORY OF PRESENT ILLNESS
[Disease: _____________________] : Disease: [unfilled] [T: ___] : T[unfilled] [N: ___] : N[unfilled] [M: ___] : M[unfilled] [AJCC Stage: ____] : AJCC Stage: [unfilled] [de-identified] : MEGHAN HEMPHILL is a 74 y.o. who we are following for an ER+, HER2- stage IA breast cancer and now a JAK2+ MPD.\par 5/21/15 - Presented with a 6mm abnormality in the right breast on routine mammo at 6:00. \par 7/29/15 - Biopsy - well differentiated IDC that was ER/AL strongly positive, HER2 negative.  \par 9/9/15 - Right breast lumpectomy and SLND ([facility]) for a T1N0 = stage IA breast cancer.\par 11/2015 - Started on Arimidex.  She is s/p adjuvant XRT. She discontinued Arimidex Fall 2016 due to musculoskeletal side effects. \par She did not want to try any other hormonal therapies. \par \par Routine blood work revealed borderline elevated H/H.  \par 7/9/16 - JAK2 mutation was detected.  s/p  phlebotomies.Thrombocytosis.\par \par Started Hydrea Sept 2020. On 500 mg daily. [de-identified] : well differentiated infiltrating ductal carcinoma [de-identified] : ER 90%, MO 90%, HER2 +1 negative [de-identified] : Had mammo in June 2022 ( [facility]) we have no report. Recent left sided hip/ leg pains, had MRI told " pinched nerve" follows with ortho spine. \par Quit smoking recently

## 2022-10-25 DIAGNOSIS — D45 POLYCYTHEMIA VERA: ICD-10-CM

## 2022-10-26 ENCOUNTER — APPOINTMENT (OUTPATIENT)
Dept: INTERNAL MEDICINE | Facility: CLINIC | Age: 77
End: 2022-10-26

## 2022-12-13 ENCOUNTER — NON-APPOINTMENT (OUTPATIENT)
Age: 77
End: 2022-12-13

## 2022-12-19 ENCOUNTER — APPOINTMENT (OUTPATIENT)
Dept: ORTHOPEDIC SURGERY | Facility: CLINIC | Age: 77
End: 2022-12-19
Payer: MEDICARE

## 2022-12-19 VITALS
SYSTOLIC BLOOD PRESSURE: 138 MMHG | BODY MASS INDEX: 24.29 KG/M2 | WEIGHT: 132 LBS | HEART RATE: 94 BPM | DIASTOLIC BLOOD PRESSURE: 85 MMHG | OXYGEN SATURATION: 95 % | HEIGHT: 62 IN

## 2022-12-19 DIAGNOSIS — M79.10 MYALGIA, UNSPECIFIED SITE: ICD-10-CM

## 2022-12-19 PROCEDURE — 99214 OFFICE O/P EST MOD 30 MIN: CPT

## 2022-12-19 PROCEDURE — 73502 X-RAY EXAM HIP UNI 2-3 VIEWS: CPT

## 2023-01-05 ENCOUNTER — APPOINTMENT (OUTPATIENT)
Dept: ORTHOPEDIC SURGERY | Facility: CLINIC | Age: 78
End: 2023-01-05

## 2023-01-06 ENCOUNTER — TRANSCRIPTION ENCOUNTER (OUTPATIENT)
Age: 78
End: 2023-01-06

## 2023-01-06 ENCOUNTER — APPOINTMENT (OUTPATIENT)
Dept: ORTHOPEDIC SURGERY | Facility: CLINIC | Age: 78
End: 2023-01-06
Payer: MEDICARE

## 2023-01-06 DIAGNOSIS — M25.562 PAIN IN LEFT KNEE: ICD-10-CM

## 2023-01-06 PROCEDURE — 99214 OFFICE O/P EST MOD 30 MIN: CPT | Mod: 95

## 2023-01-06 NOTE — REASON FOR VISIT
[Follow-Up Visit] : a follow-up visit for [Home] : at home, [unfilled] , at the time of the visit. [Other Location: e.g. Home (Enter Location, City,State)___] : at [unfilled] [Family Member] : family member [Other:____] : [unfilled]
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
## 2023-01-06 NOTE — DISCUSSION/SUMMARY
[Medication Risks Reviewed] : Medication risks reviewed [Surgical risks reviewed] : Surgical risks reviewed [de-identified] : Right hip arthritis.\par Left knee arthritis.\par S/P Left hip replacement.\par L2-5 severe stenosis.\par COPD\par +nicotine.\par Discussed all options. \par Celebrex.  \par Dr. Negron-right hip. \par Need bones scan for ? MM/cancer. \par All options discussed including rest, medicine, home exercise, acupuncture, Chiropractic care, Physical Therapy, Pain management, and last resort surgery. All questions were answered, all alternatives discussed and the patient is in complete agreement with that plan. Follow-up appointment as instructed. Any issues and the patient will call or come in sooner. \par Her granddaughter agrees with the plan.\par

## 2023-01-06 NOTE — PHYSICAL EXAM
[Normal] : Gait: normal [Stooped] : stooped [Walker] : ambulates with walker [Sanots's Sign] : negative Santos's sign [Pronator Drift] : negative pronator drift [SLR] : negative straight leg raise [de-identified] : 5 out of 5 motor strength, sensation is intact and symmetrical full range of motion flexion extension and rotation, no palpatory tenderness full range of motion of hips knees shoulders and elbows (all four extremities), no atrophy, negative straight leg raise, no pathological reflexes, no swelling, normal ambulation, no apparent distress skin is intact, no palpable lymph nodes, no upper or lower extremity instability, alert and oriented x3 and normal mood. Normal finger-to nose test. Restriction left hip more than right. \par Pain over right hip troch area.  [de-identified] : 12/23/2022\par MRI LEFT KNEE ([facility]) \par History: Callback due to patient motion.\par Technique: MRI of the left knee was performed with sagittal, axial and coronal\par proton density pulse sequences including fat suppression techniques.\par Correlations made with the original study dated 12/21/2022\par Findings: Repeat images are slightly degraded by motion artifact but diagnostic\par information is obtainable.\par BONES: There is no fracture or AVN. There are multiple small intermediate to\par hypointense foci of signal abnormality scattered throughout the distal femur and\par proximal tibia. For instance focus of signal abnormality within the femur on\par series 4 image 17 measures 4 mm\par LIGAMENTS: Both the anterior and posterior cruciate ligaments are intact.\par There is mild scarring of the MCL. The fibular collateral ligament is intact.\par Distal popliteus tendon is intact.\par MENISCI: The medial meniscus is unremarkable. There is a minimal tear along the\par superior articular surface at the central third of the lateral meniscal body,\par series 8 image 14. There is no displaced meniscal flap.\par JOINTS: There is preservation of cartilage within the medial compartment. There\par is mild generalized cartilage loss within the lateral compartment, most\par pronounced at the central aspect of the lateral femoral condyle. There is also\par slight thinning of cartilage at the patella. Trochlear cartilage is preserved..\par There is a small joint effusion. There is a trace popliteal cyst.\par MISCELLANEOUS: Quadriceps and patellar tendons are intact. Regional musculature\par is preserved.\par There is mild subcutaneous edema at the posterior lateral knee.\par Impression:\par Minimal tear along the superior articular surface of the lateral meniscal body.\par Page 2 of 2\par Mild cartilage loss within the lateral compartment and mild chondromalacia\par patella.\par Small joint effusion and trace popliteal cyst.\par Multiple small intermediate to hypointense foci of signal abnormality scattered\par throughout the distal femur and proximal tibia. Findings are nonspecific and\par represent multiple small foci of hematopoietic marrow or small foci of\par erythropoietic marrow. However given the history of previous breast cancer small\par developing metastatic foci cannot be excluded. Focal myeloma lesions may also\par have a similar appearance in this age group. Bone scan as well as surveillance\par imaging is recommended for further evaluation.\par Signed by: Delfina Ruby\par Signed Date: 1/3/2023 8:54 AM EST\par SIGNED BY: Delfina Ruby M.D., Ext. [id number] 01/03/2023 08:54 AM\par \par \par \par XR Left Hip 12/19/2022 -Left hip replacement, right hip arthritis - reviewed with the patient. \par XR AP left knee 12/14/2022 -arthritis- reviewed with the patient. \par \par \par I reviewed, interpreted and clinically correlated the following outside imaging studies, \par MRI Lumbar 9/26/22 - severe stenosis L2-3/34\par moderate to severe L4-5\par \par XR AP Lat Lumbar 08/25/2022-severe right hip arthritis-reviewed with patient. \par \par XR AP Lat Lumbar 09/13/2021 - Severe spinal stenosis present in lumbar region. She also has severe bone-on-bone articulation bilateral hips-reviewed with patient and family member.\par \par MRI-3T LUMBAR SPINE NON CONTRAST    09/16/2021   ([facility]) \par \par Findings: Vertebral bodies are normal in height without a fracture or pars\par defect. Signal arising from bone marrow does not demonstrate an aggressive\par osseous lesion or infiltrative process. Bone marrow edema along the superior L3\par and lesser degree inferior L2 endplates are new since the prior study indicative\par prominent type I Modic changes. There is a hemangioma within L5 which is stable\par since the prior exam. The lower cord and conus are normal in signal and\par morphology.\par The T11-T12 is again a moderate broad-based posterior disc protrusion\par superimposed on disc osteophytic ridging. Facet joints are moderately\par degenerated. There is moderate thecal sac compression and mild compression of\par the anterior margin of the spinal cord.\par At L1-2, there is a small circumferential disc osteophytic ridge, stable.\par There is mild to moderate facet arthrosis. There is mild thecal sac\par compression. There is mild bilateral foraminal stenosis.\par At L2-3, there is moderate circumferential disc bulge which has progressed\par since the prior study. There is mild osteophytic ridging. There is moderate\par facet arthrosis. There is severe thecal sac compression. There is impingement\par of the proximal cauda equina is slightly buckled proximally. There is moderate\par to severe bilateral foraminal stenosis. There is mild impingement of exiting\par nerve roots.\par At L3-4, There is moderate circumferential disc osteophytic ridge which extends\par into the neural foramen bilaterally. There is moderate facet arthrosis. There\par is ligamentum flavum thickening. There is severe thecal sac compression. There\par is near complete effacement of CSF. There is severe left foraminal stenosis\par with compression of the exiting nerve root. There is moderate right foraminal\par stenosis with abutment of the exiting nerve root. Findings are stable.\par At L4-5, There is moderate circumferential disc osteophytic ridge which extends \par into the neural foramen bilaterally. There is severe facet arthrosis. There is\par prominent ligamentum flavum thickening. There is severe thecal sac\par compression. There is impingement of L5 nerve root origins. There is severe\par left foraminal stenosis with compression of the exiting nerve root. There is\par moderate to severe right foraminal stenosis with mild impingement of exiting\par nerve root.\par At L5-S1, There is moderate circumferential disc osteophytic ridge which\par extends into the neural foramen bilaterally. There is moderate to severe facet\par arthrosis. There is mild thecal sac compression. There is severe bilateral\par foraminal stenosis. There is compression of the exiting nerve roots.\par Paraspinal musculature is symmetric. There is no bulky lymphadenopathy.\par Impression:\par Prominent degenerative disc disease and facet arthrosis throughout the lumbar\par spine as well as at the lower thoracic levels.\par Degenerative changes at L2-L3 have progressed since the prior exam and there are\par prominent type I Modic changes at the superior L3 and lesser degree inferior L2\par endplates. There is severe thecal sac compression with near complete effacement\par of CSF and impingement of the cauda equina. There is moderate to severe\par foraminal stenosis.\par At L3-L4 there is severe thecal sac compression. There is severe left and\par moderate right foraminal stenosis.\par At L4-L5 there is severe thecal sac compression. There is severe left and\par moderate to severe right foraminal stenosis.\par At L5-S1 there is severe bilateral foraminal stenosis. \par \par \par XR AP Lat Lumbar 09/13/2021 - lumbar degenerative disc disease, hip arthritis -reviewed with patient.

## 2023-01-12 ENCOUNTER — APPOINTMENT (OUTPATIENT)
Dept: ORTHOPEDIC SURGERY | Facility: CLINIC | Age: 78
End: 2023-01-12
Payer: MEDICARE

## 2023-01-12 VITALS
SYSTOLIC BLOOD PRESSURE: 145 MMHG | BODY MASS INDEX: 24.29 KG/M2 | HEIGHT: 62 IN | DIASTOLIC BLOOD PRESSURE: 84 MMHG | WEIGHT: 132 LBS | HEART RATE: 86 BPM

## 2023-01-12 PROCEDURE — 99406 BEHAV CHNG SMOKING 3-10 MIN: CPT

## 2023-01-12 PROCEDURE — 99215 OFFICE O/P EST HI 40 MIN: CPT | Mod: 25

## 2023-01-12 PROCEDURE — 73502 X-RAY EXAM HIP UNI 2-3 VIEWS: CPT

## 2023-01-12 NOTE — DISCUSSION/SUMMARY
[de-identified] : The patient has right hip osteoarthritis. They are not an appropriate candidate for surgical intervention at this time as she has a heavy smoker. An extensive discussion was conducted on the natural history of the disease and the variety of surgical and non-surgical options available to the patient including, but not limited to non-steroidal anti-inflammatory medications, steroid injections, physical therapy, maintenance of ideal body weight, and reduction of activity. I recommended a prescription of Mobic but the patient would prefer to use OTC NSAIDs at this time. The patient will schedule an appointment as needed. \par \par We are going to get authorization for a right hip intra-articular cortisone injection.\par She is a heavy smoker.  We discussed her trying to quit smoking before we will go ahead with any surgical booking.  She will follow-up with us in a couple of monthsThis patient is a smoker or has chronic use of nicotine products and understands our nicotine cessation policy and will be required to stop smoke 1 month before surgery and this will be continued through 1 month minimum after surgery. This includes all cigarettes, cigars, chewing tobacco, patches and gums which contain nicotine. I explained to the patient the risk of nicotine exposure, which is well documented in the orthopedic literature as increasing risks of wound breakdown (dehiscence), periprosthetic joint infection, dissatisfaction, chronic pain, and impaired overall outcome to the patient. The patient may be tested for nicotine in addition prior to undergoing joint arthroplasty. We discussed the options of quitting immediately, as well as the risk of tapering off with or without the use of medications or counseling. The patient is in agreement with this plan.  5 minutes was spent discussing with the patient the benefits of smoking cessation.\par

## 2023-01-12 NOTE — HISTORY OF PRESENT ILLNESS
[de-identified] : This is a very nice  77 year old  female experiencing worsening pain in the right hip which is severe in intensity and has been going on for at least 3 months now. She has Hx of left BYRON at [facility] in 11/2021.  She is a heavy smoker and has Hx of Bipolar Disorder. The pain substantially limits activities of daily living. Walking tolerance is reduced. Medication and activity modification have been minimally effective for a period lasting greater than three months in duration. Assistive devices and external support were not deemed by the patient to be helpful in improving their function. The patient denies any radiation of the pain to the feet and it is not associated with numbness, tingling, or weakness.

## 2023-01-24 ENCOUNTER — APPOINTMENT (OUTPATIENT)
Dept: ULTRASOUND IMAGING | Facility: CLINIC | Age: 78
End: 2023-01-24
Payer: MEDICARE

## 2023-01-24 ENCOUNTER — RESULT REVIEW (OUTPATIENT)
Age: 78
End: 2023-01-24

## 2023-01-24 ENCOUNTER — OUTPATIENT (OUTPATIENT)
Dept: OUTPATIENT SERVICES | Facility: HOSPITAL | Age: 78
LOS: 1 days | End: 2023-01-24
Payer: COMMERCIAL

## 2023-01-24 DIAGNOSIS — Z90.710 ACQUIRED ABSENCE OF BOTH CERVIX AND UTERUS: Chronic | ICD-10-CM

## 2023-01-24 DIAGNOSIS — M16.11 UNILATERAL PRIMARY OSTEOARTHRITIS, RIGHT HIP: ICD-10-CM

## 2023-01-24 PROCEDURE — 20611 DRAIN/INJ JOINT/BURSA W/US: CPT | Mod: RT

## 2023-01-24 PROCEDURE — 20611 DRAIN/INJ JOINT/BURSA W/US: CPT

## 2023-02-06 ENCOUNTER — APPOINTMENT (OUTPATIENT)
Dept: ORTHOPEDIC SURGERY | Facility: CLINIC | Age: 78
End: 2023-02-06
Payer: MEDICARE

## 2023-02-06 VITALS — HEIGHT: 62 IN | WEIGHT: 293 LBS | BODY MASS INDEX: 53.92 KG/M2

## 2023-02-06 DIAGNOSIS — Z00.00 ENCOUNTER FOR GENERAL ADULT MEDICAL EXAMINATION W/OUT ABNORMAL FINDINGS: ICD-10-CM

## 2023-02-06 PROCEDURE — 99214 OFFICE O/P EST MOD 30 MIN: CPT | Mod: 25

## 2023-02-06 PROCEDURE — 73502 X-RAY EXAM HIP UNI 2-3 VIEWS: CPT | Mod: RT

## 2023-02-06 PROCEDURE — 20610 DRAIN/INJ JOINT/BURSA W/O US: CPT | Mod: RT

## 2023-02-06 PROCEDURE — 73564 X-RAY EXAM KNEE 4 OR MORE: CPT | Mod: RT

## 2023-02-06 NOTE — REASON FOR VISIT
[Follow-Up Visit] : a follow-up visit for [Osteoarthritis, Hip] : osteoarthritis of the hip [Knee Pain] : knee pain

## 2023-02-06 NOTE — PHYSICAL EXAM
[de-identified] : Well developed, well nourished in no apparent distress, awake, alert and orientated to person, place and time.\par Respirations are even and unlabored. Gait evaluation reveals a limp. There is no inguinal adenopathy. \par Examination of the contralateral hip shows normal range of motion, strength, no tenderness, and intact skin. \par The affected limb is well-perfused, with palpable pedal pulses and no skin lesions\par Sensorimotor is grossly intact. \par Hip motion is reduced and causes pain. \par FADIR is positive and GEORGINA is positive. Stinchfield test is positive.\par Leg lengths are approximately equal. Both hips are stable and muscle strength is normal. \par Right knee motion is significantly reduced and does cause significant pain. The knee moves from 0 to 125 degrees. The knee is stable within that range-of-motion to AP and ML stress. The alignment of the knee is 5 degrees varus. Muscle strength is normal. Pedal pulses are palpable. \par  [de-identified] : AP and lateral x-rays of the right hip, pelvis, and femur were ordered and taken in the office and demonstrate severe degenerative joint disease of the hip with joint space narrowing, osteophyte formation, and subchondral sclerosis. \par \par Long standing knee, AP knee, lateral knee, and patellar views of the right knee were ordered and taken in the office and demonstrate mild degenerative joint disease of the knee with joint space narrowing, osteophyte formation, and subchondral sclerosis.

## 2023-02-06 NOTE — DISCUSSION/SUMMARY
[de-identified] : The patient has right hip osteoarthritis that is severe and mild right knee osteoarthritis. They are not an appropriate candidate for surgical intervention at this time as she has a heavy smoker although she is working on quitting smoking. An extensive discussion was conducted on the natural history of the disease and the variety of surgical and non-surgical options available to the patient including, but not limited to non-steroidal anti-inflammatory medications, steroid injections, physical therapy, maintenance of ideal body weight, and reduction of activity. I recommended a prescription of Mobic and physical therapy which were prescribed.  She will follow-up when she is finished quitting smoking.  The patient will schedule an appointment as needed.  Today we performed a right knee intra-articular cortisone injection.\par \par Informed consent for the right knee injection was obtained. All questions were answered. A time out was performed. The right knee was prepped and draped in sterile fashion. Using sterile technique, the right knee was injected with 80mg of Kenalog, 4cc of 1% lidocaine, 4cc of 0.25% marcaine using a 21-gauge needle. A sterile dressing was applied. Post injection instructions were reviewed. The patient tolerated the procedure well.\par \par She is a heavy smoker.  We discussed her trying to quit smoking before we will go ahead with any surgical booking.  She will follow-up with us in a couple of monthsThis patient is a smoker or has chronic use of nicotine products and understands our nicotine cessation policy and will be required to stop smoke 1 month before surgery and this will be continued through 1 month minimum after surgery. This includes all cigarettes, cigars, chewing tobacco, patches and gums which contain nicotine. I explained to the patient the risk of nicotine exposure, which is well documented in the orthopedic literature as increasing risks of wound breakdown (dehiscence), periprosthetic joint infection, dissatisfaction, chronic pain, and impaired overall outcome to the patient. The patient may be tested for nicotine in addition prior to undergoing joint arthroplasty. We discussed the options of quitting immediately, as well as the risk of tapering off with or without the use of medications or counseling. The patient is in agreement with this plan.  \par

## 2023-02-06 NOTE — HISTORY OF PRESENT ILLNESS
[de-identified] : This is a very nice 78 year old  female experiencing worsening pain in the right hip which is severe in intensity and has been going on for at least 6 months now. She has Hx of left BYRON at [facility] in 11/2021.  She is a heavy smoker and has Hx of Bipolar Disorder.  Working on quitting smoking.  The pain substantially limits activities of daily living. Walking tolerance is reduced.  Prior cortisone injection was very helpful.  Now also has some mild knee pain in the right knee.  Medication and activity modification have been minimally effective for a period lasting greater than three months in duration. Assistive devices and external support were not deemed by the patient to be helpful in improving their function. The patient denies any radiation of the pain to the feet and it is not associated with numbness, tingling, or weakness.

## 2023-03-14 ENCOUNTER — APPOINTMENT (OUTPATIENT)
Dept: ORTHOPEDIC SURGERY | Facility: CLINIC | Age: 78
End: 2023-03-14
Payer: MEDICARE

## 2023-03-14 DIAGNOSIS — M16.11 UNILATERAL PRIMARY OSTEOARTHRITIS, RIGHT HIP: ICD-10-CM

## 2023-03-14 DIAGNOSIS — M17.11 UNILATERAL PRIMARY OSTEOARTHRITIS, RIGHT KNEE: ICD-10-CM

## 2023-03-14 PROCEDURE — 99214 OFFICE O/P EST MOD 30 MIN: CPT

## 2023-03-14 NOTE — DISCUSSION/SUMMARY
[de-identified] : The patient has right hip osteoarthritis that is severe and mild right knee osteoarthritis. They are not an appropriate candidate for surgical intervention at this time as she has a heavy smoker although she is working on quitting smoking. An extensive discussion was conducted on the natural history of the disease and the variety of surgical and non-surgical options available to the patient including, but not limited to non-steroidal anti-inflammatory medications, steroid injections, physical therapy, maintenance of ideal body weight, and reduction of activity. I recommended a prescription of Mobic and physical therapy which were prescribed.  She will follow-up when she is finished quitting smoking.  The patient will schedule an appointment as needed.  \par \par She is a heavy smoker.  We discussed her trying to quit smoking before we will go ahead with any surgical booking.  She will follow-up with us in a couple of monthsThis patient is a smoker or has chronic use of nicotine products and understands our nicotine cessation policy and will be required to stop smoke 1 month before surgery and this will be continued through 1 month minimum after surgery. This includes all cigarettes, cigars, chewing tobacco, patches and gums which contain nicotine. I explained to the patient the risk of nicotine exposure, which is well documented in the orthopedic literature as increasing risks of wound breakdown (dehiscence), periprosthetic joint infection, dissatisfaction, chronic pain, and impaired overall outcome to the patient. The patient may be tested for nicotine in addition prior to undergoing joint arthroplasty. We discussed the options of quitting immediately, as well as the risk of tapering off with or without the use of medications or counseling. The patient is in agreement with this plan.  \par

## 2023-03-14 NOTE — HISTORY OF PRESENT ILLNESS
[de-identified] : This is a very nice 78 year old  female experiencing worsening pain in the right hip which is severe in intensity and has been going on for at least 6 months now. She has Hx of left BYRON at [facility] in 11/2021.  She is a heavy smoker and has Hx of Bipolar Disorder.  Working on quitting smoking.  The pain substantially limits activities of daily living. Walking tolerance is reduced.  Prior cortisone injection was very helpful.  Now also has some mild knee pain in the right knee.  Medication and activity modification have been minimally effective for a period lasting greater than three months in duration. Assistive devices and external support were not deemed by the patient to be helpful in improving their function. The patient denies any radiation of the pain to the feet and it is not associated with numbness, tingling, or weakness.

## 2023-03-14 NOTE — PHYSICAL EXAM
[de-identified] : Well developed, well nourished in no apparent distress, awake, alert and orientated to person, place and time.\par Respirations are even and unlabored. Gait evaluation reveals a limp. There is no inguinal adenopathy. \par Examination of the contralateral hip shows normal range of motion, strength, no tenderness, and intact skin. \par The affected limb is well-perfused, with palpable pedal pulses and no skin lesions\par Sensorimotor is grossly intact. \par Hip motion is reduced and causes pain. \par FADIR is positive and GEORGINA is positive. Stinchfield test is positive.\par Leg lengths are approximately equal. Both hips are stable and muscle strength is normal. \par Right knee motion is significantly reduced and does cause significant pain. The knee moves from 0 to 125 degrees. The knee is stable within that range-of-motion to AP and ML stress. The alignment of the knee is 5 degrees varus. Muscle strength is normal. Pedal pulses are palpable. \par

## 2023-04-12 ENCOUNTER — OUTPATIENT (OUTPATIENT)
Dept: OUTPATIENT SERVICES | Facility: HOSPITAL | Age: 78
LOS: 1 days | Discharge: HOME | End: 2023-04-12

## 2023-04-12 DIAGNOSIS — Z90.710 ACQUIRED ABSENCE OF BOTH CERVIX AND UTERUS: Chronic | ICD-10-CM

## 2023-04-12 DIAGNOSIS — D45 POLYCYTHEMIA VERA: ICD-10-CM

## 2023-04-17 ENCOUNTER — APPOINTMENT (OUTPATIENT)
Dept: HEMATOLOGY ONCOLOGY | Facility: CLINIC | Age: 78
End: 2023-04-17
Payer: MEDICARE

## 2023-04-17 ENCOUNTER — RESULT REVIEW (OUTPATIENT)
Age: 78
End: 2023-04-17

## 2023-04-17 VITALS
DIASTOLIC BLOOD PRESSURE: 72 MMHG | WEIGHT: 131.38 LBS | RESPIRATION RATE: 18 BRPM | HEART RATE: 76 BPM | SYSTOLIC BLOOD PRESSURE: 141 MMHG | BODY MASS INDEX: 24.03 KG/M2 | TEMPERATURE: 97.7 F | OXYGEN SATURATION: 96 %

## 2023-04-17 DIAGNOSIS — C50.911 MALIGNANT NEOPLASM OF UNSPECIFIED SITE OF RIGHT FEMALE BREAST: ICD-10-CM

## 2023-04-17 LAB
BASOPHILS # BLD AUTO: 0.06 K/UL — SIGNIFICANT CHANGE UP (ref 0–0.2)
BASOPHILS NFR BLD AUTO: 1 % — SIGNIFICANT CHANGE UP (ref 0–2)
EOSINOPHIL # BLD AUTO: 0.07 K/UL — SIGNIFICANT CHANGE UP (ref 0–0.5)
EOSINOPHIL NFR BLD AUTO: 1.2 % — SIGNIFICANT CHANGE UP (ref 0–6)
HCT VFR BLD CALC: 43.5 % — SIGNIFICANT CHANGE UP (ref 34.5–45)
HGB BLD-MCNC: 15.2 G/DL — SIGNIFICANT CHANGE UP (ref 11.5–15.5)
IMM GRANULOCYTES NFR BLD AUTO: 0.5 % — SIGNIFICANT CHANGE UP (ref 0–0.9)
LYMPHOCYTES # BLD AUTO: 1.77 K/UL — SIGNIFICANT CHANGE UP (ref 1–3.3)
LYMPHOCYTES # BLD AUTO: 30.3 % — SIGNIFICANT CHANGE UP (ref 13–44)
MCHC RBC-ENTMCNC: 34.9 GM/DL — SIGNIFICANT CHANGE UP (ref 32–36)
MCHC RBC-ENTMCNC: 36 PG — HIGH (ref 27–34)
MCV RBC AUTO: 103.1 FL — HIGH (ref 80–100)
MONOCYTES # BLD AUTO: 0.65 K/UL — SIGNIFICANT CHANGE UP (ref 0–0.9)
MONOCYTES NFR BLD AUTO: 11.1 % — SIGNIFICANT CHANGE UP (ref 2–14)
NEUTROPHILS # BLD AUTO: 3.27 K/UL — SIGNIFICANT CHANGE UP (ref 1.8–7.4)
NEUTROPHILS NFR BLD AUTO: 55.9 % — SIGNIFICANT CHANGE UP (ref 43–77)
NRBC # BLD: 0 /100 WBCS — SIGNIFICANT CHANGE UP (ref 0–0)
PLATELET # BLD AUTO: 369 K/UL — SIGNIFICANT CHANGE UP (ref 150–400)
RBC # BLD: 4.22 M/UL — SIGNIFICANT CHANGE UP (ref 3.8–5.2)
RBC # FLD: 12.6 % — SIGNIFICANT CHANGE UP (ref 10.3–14.5)
WBC # BLD: 5.85 K/UL — SIGNIFICANT CHANGE UP (ref 3.8–10.5)
WBC # FLD AUTO: 5.85 K/UL — SIGNIFICANT CHANGE UP (ref 3.8–10.5)

## 2023-04-17 PROCEDURE — 99213 OFFICE O/P EST LOW 20 MIN: CPT

## 2023-04-17 RX ORDER — PREDNISONE 5 MG/1
5 TABLET ORAL
Qty: 54 | Refills: 0 | Status: DISCONTINUED | COMMUNITY
Start: 2022-10-14 | End: 2023-04-17

## 2023-04-17 RX ORDER — DOXYCYCLINE HYCLATE 100 MG/1
100 CAPSULE ORAL
Qty: 14 | Refills: 0 | Status: COMPLETED | COMMUNITY
Start: 2023-03-26

## 2023-04-17 RX ORDER — TIOTROPIUM BROMIDE 18 UG/1
18 CAPSULE ORAL; RESPIRATORY (INHALATION)
Qty: 30 | Refills: 0 | Status: DISCONTINUED | COMMUNITY
Start: 2022-10-14 | End: 2023-04-17

## 2023-04-17 RX ORDER — GABAPENTIN 300 MG/1
300 CAPSULE ORAL
Qty: 30 | Refills: 0 | Status: DISCONTINUED | COMMUNITY
Start: 2022-09-13 | End: 2023-04-17

## 2023-04-17 RX ORDER — DIVALPROEX SODIUM 250 MG/1
250 TABLET, EXTENDED RELEASE ORAL
Refills: 0 | Status: ACTIVE | COMMUNITY
Start: 2019-07-12

## 2023-04-17 RX ORDER — KETOCONAZOLE 20 MG/G
2 CREAM TOPICAL
Qty: 60 | Refills: 0 | Status: COMPLETED | COMMUNITY
Start: 2023-03-21

## 2023-04-17 RX ORDER — TRIAMCINOLONE ACETONIDE 1 MG/G
0.1 OINTMENT TOPICAL
Qty: 30 | Refills: 0 | Status: COMPLETED | COMMUNITY
Start: 2023-03-21

## 2023-04-17 RX ORDER — CELECOXIB 200 MG/1
200 CAPSULE ORAL DAILY
Qty: 90 | Refills: 2 | Status: DISCONTINUED | COMMUNITY
Start: 2022-12-19 | End: 2023-04-17

## 2023-04-17 RX ORDER — PSYLLIUM HUSK 0.4 G
CAPSULE ORAL
Refills: 0 | Status: ACTIVE | COMMUNITY

## 2023-04-17 RX ORDER — MULTIVIT-MIN/IRON/FOLIC ACID/K 18-600-40
500 CAPSULE ORAL
Refills: 0 | Status: ACTIVE | COMMUNITY

## 2023-04-17 RX ORDER — CYANOCOBALAMIN (VITAMIN B-12) 500 MCG
500 LOZENGE ORAL
Refills: 0 | Status: ACTIVE | COMMUNITY

## 2023-04-17 RX ORDER — VITAMIN K2 90 MCG
125 MCG CAPSULE ORAL
Refills: 0 | Status: ACTIVE | COMMUNITY

## 2023-04-17 RX ORDER — CYCLOBENZAPRINE HYDROCHLORIDE 10 MG/1
10 TABLET, FILM COATED ORAL
Qty: 10 | Refills: 0 | Status: DISCONTINUED | COMMUNITY
Start: 2022-09-22 | End: 2023-04-17

## 2023-04-17 RX ORDER — METHYLPREDNISOLONE 4 MG/1
4 TABLET ORAL
Qty: 2 | Refills: 1 | Status: DISCONTINUED | COMMUNITY
Start: 2022-10-19 | End: 2023-04-17

## 2023-04-17 RX ORDER — METOPROLOL TARTRATE 25 MG/1
25 TABLET, FILM COATED ORAL
Qty: 30 | Refills: 0 | Status: DISCONTINUED | COMMUNITY
Start: 2022-10-14 | End: 2023-04-17

## 2023-04-17 RX ORDER — ALENDRONATE SODIUM 70 MG/1
70 TABLET ORAL
Qty: 12 | Refills: 0 | Status: DISCONTINUED | COMMUNITY
Start: 2022-03-11 | End: 2023-04-17

## 2023-04-17 NOTE — REVIEW OF SYSTEMS
[Patient Intake Form Reviewed] : Patient intake form was reviewed [Joint Pain] : joint pain [Negative] : Heme/Lymph [FreeTextEntry9] : needs right hip replacement

## 2023-04-17 NOTE — HISTORY OF PRESENT ILLNESS
[Disease: _____________________] : Disease: [unfilled] [T: ___] : T[unfilled] [N: ___] : N[unfilled] [M: ___] : M[unfilled] [AJCC Stage: ____] : AJCC Stage: [unfilled] [de-identified] : MEGHAN HEMPHILL is a 78 y.o. who we are following for an ER+, HER2- stage IA breast cancer and now a JAK2+ MPD.\par \par 5/21/15 - Presented with a 6mm abnormality in the right breast on routine mammo at 6:00. \par 7/29/15 - Biopsy - well differentiated IDC that was ER/FL strongly positive, HER2 negative.  \par 9/9/15 - Right breast lumpectomy and SLND (Primitivoi) for a T1N0 = stage IA breast cancer.\par 11/2015 - Started on Arimidex.  She is s/p adjuvant XRT. She discontinued Arimidex Fall 2016 due to musculoskeletal side effects. \par She did not want to try any other hormonal therapies. \par \par Routine blood work revealed borderline elevated H/H.  \par 7/9/16 - JAK2 mutation was detected. S/p  phlebotomies. Thrombocytosis.\par \par Sept 2020 - Started Hydrea.  On 500 mg daily. [de-identified] : well differentiated infiltrating ductal carcinoma [de-identified] : ER 90%, W[place] 90%, HER2 +1 negative [de-identified] : Patient reports will need right hip replacement.  She actually had a date set up for this but then it was canceled over insurance issues.  \par She now needs to find an orthopedist that will take her insurance. \par She has been compliant with her Hydrea. \par Still smoking 1PPD- prior notes state she had quit - she states she did not quit for long. \par Denies any changes in her family, medical, or social history since her last visit of 10/24/22.

## 2023-04-17 NOTE — PHYSICAL EXAM
[Thin] : thin [Normal] : affect appropriate [de-identified] : anicteric [de-identified] : no c/c/e [de-identified] : no rashes

## 2023-04-17 NOTE — ASSESSMENT
[FreeTextEntry1] : Patient is a 78 y.o. with hx of an ER++, HER2-, right sided stage IA breast cancer, s/p lumpectomy 9/2015, and XRT. Did not tolerate adjuvant hormonal therapy.   \par Mammogram - due in 7/2023 ([facility] - goes yearly). \par  \par JAK2 positive P vera. Counts well controlled. Continue Hydrea 500 mg daily. \par Continue ASA.\par Patient reports will need surgery for right hip replacement soon.  \par Exam in 6 months. \par \par PCP NIKHIL REDDING

## 2023-04-18 DIAGNOSIS — C50.911 MALIGNANT NEOPLASM OF UNSPECIFIED SITE OF RIGHT FEMALE BREAST: ICD-10-CM

## 2023-04-18 DIAGNOSIS — Z51.81 ENCOUNTER FOR THERAPEUTIC DRUG LEVEL MONITORING: ICD-10-CM

## 2023-04-18 DIAGNOSIS — Z15.89 GENETIC SUSCEPTIBILITY TO OTHER DISEASE: ICD-10-CM

## 2023-07-06 ENCOUNTER — NON-APPOINTMENT (OUTPATIENT)
Age: 78
End: 2023-07-06

## 2023-07-19 ENCOUNTER — APPOINTMENT (OUTPATIENT)
Dept: UROLOGY | Facility: CLINIC | Age: 78
End: 2023-07-19
Payer: MEDICARE

## 2023-07-19 VITALS
DIASTOLIC BLOOD PRESSURE: 88 MMHG | HEART RATE: 83 BPM | BODY MASS INDEX: 24.84 KG/M2 | SYSTOLIC BLOOD PRESSURE: 135 MMHG | WEIGHT: 135 LBS | HEIGHT: 62 IN | OXYGEN SATURATION: 95 %

## 2023-07-19 PROCEDURE — 99204 OFFICE O/P NEW MOD 45 MIN: CPT

## 2023-07-19 NOTE — REVIEW OF SYSTEMS
[Negative] : Heme/Lymph [Wake up at night to urinate  How many times?  ___] : wakes up to urinate [unfilled] times during the night [Strong urge to urinate] : strong urge to urinate [Strain or push to urinate] : strain or push to urinate [Depression] : depression [FreeTextEntry2] : high blood pressure  [FreeTextEntry6] : frequent urination- 10

## 2023-07-19 NOTE — HISTORY OF PRESENT ILLNESS
[FreeTextEntry1] : This patient presents with a complaint of urinary frequency. She states this has been worsening over the past few months. She denies the feeling of incomplete bladder emptying or other urinary complaints.

## 2023-07-19 NOTE — ASSESSMENT
[FreeTextEntry1] : Labs and renal bladder sonogram are ordered. Patient will be placed on samples of Oxybutynin and Myrbetriq. She will return to the office for a cystoscopy and urodynamics.

## 2023-07-21 LAB
APPEARANCE: CLEAR
BACTERIA UR CULT: NORMAL
BACTERIA: NEGATIVE /HPF
BILIRUBIN URINE: NEGATIVE
BLOOD URINE: NEGATIVE
CAST: 0 /LPF
COLOR: YELLOW
EPITHELIAL CELLS: 0 /HPF
GLUCOSE QUALITATIVE U: NEGATIVE MG/DL
KETONES URINE: NEGATIVE MG/DL
LEUKOCYTE ESTERASE URINE: NEGATIVE
MICROSCOPIC-UA: NORMAL
NITRITE URINE: NEGATIVE
PH URINE: 6.5
PROTEIN URINE: NEGATIVE MG/DL
RED BLOOD CELLS URINE: 0 /HPF
SPECIFIC GRAVITY URINE: 1.01
UROBILINOGEN URINE: 0.2 MG/DL
WHITE BLOOD CELLS URINE: 0 /HPF

## 2023-07-25 LAB — URINE CYTOLOGY: NORMAL

## 2023-08-07 ENCOUNTER — APPOINTMENT (OUTPATIENT)
Dept: ULTRASOUND IMAGING | Facility: CLINIC | Age: 78
End: 2023-08-07
Payer: MEDICARE

## 2023-08-07 ENCOUNTER — OUTPATIENT (OUTPATIENT)
Dept: OUTPATIENT SERVICES | Facility: HOSPITAL | Age: 78
LOS: 1 days | End: 2023-08-07
Payer: COMMERCIAL

## 2023-08-07 DIAGNOSIS — R35.0 FREQUENCY OF MICTURITION: ICD-10-CM

## 2023-08-07 DIAGNOSIS — Z00.8 ENCOUNTER FOR OTHER GENERAL EXAMINATION: ICD-10-CM

## 2023-08-07 DIAGNOSIS — Z90.710 ACQUIRED ABSENCE OF BOTH CERVIX AND UTERUS: Chronic | ICD-10-CM

## 2023-08-07 PROCEDURE — 76770 US EXAM ABDO BACK WALL COMP: CPT

## 2023-08-07 PROCEDURE — 76770 US EXAM ABDO BACK WALL COMP: CPT | Mod: 26

## 2023-08-14 ENCOUNTER — APPOINTMENT (OUTPATIENT)
Dept: UROLOGY | Facility: CLINIC | Age: 78
End: 2023-08-14
Payer: MEDICARE

## 2023-08-14 VITALS
HEIGHT: 64 IN | SYSTOLIC BLOOD PRESSURE: 130 MMHG | HEART RATE: 76 BPM | OXYGEN SATURATION: 96 % | WEIGHT: 130 LBS | DIASTOLIC BLOOD PRESSURE: 74 MMHG | RESPIRATION RATE: 16 BRPM | BODY MASS INDEX: 22.2 KG/M2

## 2023-08-14 PROCEDURE — 52285 CYSTOSCOPY AND TREATMENT: CPT

## 2023-08-14 PROCEDURE — 99213 OFFICE O/P EST LOW 20 MIN: CPT | Mod: 25

## 2023-08-14 NOTE — ADDENDUM
[FreeTextEntry1] : I, Bo Rouse, acted as a scribe on behalf of Dr. Remington Antony 08/14/2023. All medical entries made by the scribe were at my, Dr. Remington Antony's, direction and personally dictated by me on 08/14/2023. I have reviewed the chart and agree that the record accurately reflects my personal performance of the history, physical exam, assessment and plan. I have also personally directed, reviewed, and agreed with the chart.

## 2023-08-14 NOTE — HISTORY OF PRESENT ILLNESS
[FreeTextEntry1] : 78F presents today with a CC of urinary frequency. Pt sampled Oxybutynin and felt that results were very good. She is here today for cystourethroscopy. Some ureteral stenosis was noted.

## 2023-08-14 NOTE — PHYSICAL EXAM

## 2023-08-24 ENCOUNTER — APPOINTMENT (OUTPATIENT)
Dept: UROLOGY | Facility: CLINIC | Age: 78
End: 2023-08-24
Payer: MEDICARE

## 2023-08-24 DIAGNOSIS — R35.0 FREQUENCY OF MICTURITION: ICD-10-CM

## 2023-08-24 DIAGNOSIS — N34.3 URETHRAL SYNDROME, UNSPECIFIED: ICD-10-CM

## 2023-08-24 PROCEDURE — 51798 US URINE CAPACITY MEASURE: CPT | Mod: 59

## 2023-08-24 PROCEDURE — 51728 CYSTOMETROGRAM W/VP: CPT

## 2023-08-24 PROCEDURE — 51784 ANAL/URINARY MUSCLE STUDY: CPT

## 2023-08-24 PROCEDURE — 51797 INTRAABDOMINAL PRESSURE TEST: CPT

## 2023-08-24 PROCEDURE — 51741 ELECTRO-UROFLOWMETRY FIRST: CPT

## 2023-10-01 PROBLEM — Z92.3 HISTORY OF RADIATION THERAPY: Status: RESOLVED | Noted: 2018-03-09 | Resolved: 2023-10-01

## 2023-10-22 ENCOUNTER — OUTPATIENT (OUTPATIENT)
Dept: OUTPATIENT SERVICES | Facility: HOSPITAL | Age: 78
LOS: 1 days | Discharge: HOME | End: 2023-10-22

## 2023-10-22 DIAGNOSIS — D45 POLYCYTHEMIA VERA: ICD-10-CM

## 2023-10-22 DIAGNOSIS — Z90.710 ACQUIRED ABSENCE OF BOTH CERVIX AND UTERUS: Chronic | ICD-10-CM

## 2023-10-23 ENCOUNTER — RESULT REVIEW (OUTPATIENT)
Age: 78
End: 2023-10-23

## 2023-10-23 ENCOUNTER — APPOINTMENT (OUTPATIENT)
Dept: HEMATOLOGY ONCOLOGY | Facility: CLINIC | Age: 78
End: 2023-10-23
Payer: MEDICARE

## 2023-10-23 VITALS
SYSTOLIC BLOOD PRESSURE: 121 MMHG | BODY MASS INDEX: 24.06 KG/M2 | RESPIRATION RATE: 17 BRPM | WEIGHT: 140.19 LBS | DIASTOLIC BLOOD PRESSURE: 77 MMHG | TEMPERATURE: 97.5 F | OXYGEN SATURATION: 97 % | HEART RATE: 81 BPM

## 2023-10-23 DIAGNOSIS — Z96.641 PRESENCE OF RIGHT ARTIFICIAL HIP JOINT: ICD-10-CM

## 2023-10-23 DIAGNOSIS — D75.839 THROMBOCYTOSIS, UNSPECIFIED: ICD-10-CM

## 2023-10-23 LAB
BASOPHILS # BLD AUTO: 0.05 K/UL — SIGNIFICANT CHANGE UP (ref 0–0.2)
BASOPHILS # BLD AUTO: 0.05 K/UL — SIGNIFICANT CHANGE UP (ref 0–0.2)
BASOPHILS NFR BLD AUTO: 0.9 % — SIGNIFICANT CHANGE UP (ref 0–2)
BASOPHILS NFR BLD AUTO: 0.9 % — SIGNIFICANT CHANGE UP (ref 0–2)
EOSINOPHIL # BLD AUTO: 0.08 K/UL — SIGNIFICANT CHANGE UP (ref 0–0.5)
EOSINOPHIL # BLD AUTO: 0.08 K/UL — SIGNIFICANT CHANGE UP (ref 0–0.5)
EOSINOPHIL NFR BLD AUTO: 1.4 % — SIGNIFICANT CHANGE UP (ref 0–6)
EOSINOPHIL NFR BLD AUTO: 1.4 % — SIGNIFICANT CHANGE UP (ref 0–6)
HCT VFR BLD CALC: 40.1 % — SIGNIFICANT CHANGE UP (ref 34.5–45)
HCT VFR BLD CALC: 40.1 % — SIGNIFICANT CHANGE UP (ref 34.5–45)
HGB BLD-MCNC: 13.9 G/DL — SIGNIFICANT CHANGE UP (ref 11.5–15.5)
HGB BLD-MCNC: 13.9 G/DL — SIGNIFICANT CHANGE UP (ref 11.5–15.5)
IMM GRANULOCYTES NFR BLD AUTO: 0.3 % — SIGNIFICANT CHANGE UP (ref 0–0.9)
IMM GRANULOCYTES NFR BLD AUTO: 0.3 % — SIGNIFICANT CHANGE UP (ref 0–0.9)
LYMPHOCYTES # BLD AUTO: 1.55 K/UL — SIGNIFICANT CHANGE UP (ref 1–3.3)
LYMPHOCYTES # BLD AUTO: 1.55 K/UL — SIGNIFICANT CHANGE UP (ref 1–3.3)
LYMPHOCYTES # BLD AUTO: 26.9 % — SIGNIFICANT CHANGE UP (ref 13–44)
LYMPHOCYTES # BLD AUTO: 26.9 % — SIGNIFICANT CHANGE UP (ref 13–44)
MCHC RBC-ENTMCNC: 34.7 GM/DL — SIGNIFICANT CHANGE UP (ref 32–36)
MCHC RBC-ENTMCNC: 34.7 GM/DL — SIGNIFICANT CHANGE UP (ref 32–36)
MCHC RBC-ENTMCNC: 35.9 PG — HIGH (ref 27–34)
MCHC RBC-ENTMCNC: 35.9 PG — HIGH (ref 27–34)
MCV RBC AUTO: 103.6 FL — HIGH (ref 80–100)
MCV RBC AUTO: 103.6 FL — HIGH (ref 80–100)
MONOCYTES # BLD AUTO: 0.68 K/UL — SIGNIFICANT CHANGE UP (ref 0–0.9)
MONOCYTES # BLD AUTO: 0.68 K/UL — SIGNIFICANT CHANGE UP (ref 0–0.9)
MONOCYTES NFR BLD AUTO: 11.8 % — SIGNIFICANT CHANGE UP (ref 2–14)
MONOCYTES NFR BLD AUTO: 11.8 % — SIGNIFICANT CHANGE UP (ref 2–14)
NEUTROPHILS # BLD AUTO: 3.38 K/UL — SIGNIFICANT CHANGE UP (ref 1.8–7.4)
NEUTROPHILS # BLD AUTO: 3.38 K/UL — SIGNIFICANT CHANGE UP (ref 1.8–7.4)
NEUTROPHILS NFR BLD AUTO: 58.7 % — SIGNIFICANT CHANGE UP (ref 43–77)
NEUTROPHILS NFR BLD AUTO: 58.7 % — SIGNIFICANT CHANGE UP (ref 43–77)
NRBC # BLD: 0 /100 WBCS — SIGNIFICANT CHANGE UP (ref 0–0)
NRBC # BLD: 0 /100 WBCS — SIGNIFICANT CHANGE UP (ref 0–0)
PLATELET # BLD AUTO: 469 K/UL — HIGH (ref 150–400)
PLATELET # BLD AUTO: 469 K/UL — HIGH (ref 150–400)
RBC # BLD: 3.87 M/UL — SIGNIFICANT CHANGE UP (ref 3.8–5.2)
RBC # BLD: 3.87 M/UL — SIGNIFICANT CHANGE UP (ref 3.8–5.2)
RBC # FLD: 13.8 % — SIGNIFICANT CHANGE UP (ref 10.3–14.5)
RBC # FLD: 13.8 % — SIGNIFICANT CHANGE UP (ref 10.3–14.5)
WBC # BLD: 5.76 K/UL — SIGNIFICANT CHANGE UP (ref 3.8–10.5)
WBC # BLD: 5.76 K/UL — SIGNIFICANT CHANGE UP (ref 3.8–10.5)
WBC # FLD AUTO: 5.76 K/UL — SIGNIFICANT CHANGE UP (ref 3.8–10.5)
WBC # FLD AUTO: 5.76 K/UL — SIGNIFICANT CHANGE UP (ref 3.8–10.5)

## 2023-10-23 PROCEDURE — 99213 OFFICE O/P EST LOW 20 MIN: CPT

## 2023-10-23 RX ORDER — MELOXICAM 15 MG/1
15 TABLET ORAL
Qty: 30 | Refills: 1 | Status: DISCONTINUED | COMMUNITY
Start: 2023-02-06 | End: 2023-10-23

## 2023-10-23 RX ORDER — MIRABEGRON 25 MG/1
25 TABLET, FILM COATED, EXTENDED RELEASE ORAL
Qty: 7 | Refills: 0 | Status: DISCONTINUED | OUTPATIENT
Start: 2023-07-19 | End: 2023-10-23

## 2023-10-23 RX ORDER — OXYBUTYNIN CHLORIDE 10 MG/1
10 TABLET, EXTENDED RELEASE ORAL
Qty: 30 | Refills: 11 | Status: DISCONTINUED | COMMUNITY
Start: 2023-08-14 | End: 2023-10-23

## 2023-10-23 RX ORDER — MIRABEGRON 50 MG/1
50 TABLET, FILM COATED, EXTENDED RELEASE ORAL DAILY
Qty: 7 | Refills: 0 | Status: DISCONTINUED | OUTPATIENT
Start: 2023-07-19 | End: 2023-10-23

## 2023-10-24 DIAGNOSIS — Z96.641 PRESENCE OF RIGHT ARTIFICIAL HIP JOINT: ICD-10-CM

## 2023-10-24 DIAGNOSIS — Z51.81 ENCOUNTER FOR THERAPEUTIC DRUG LEVEL MONITORING: ICD-10-CM

## 2023-10-24 DIAGNOSIS — D75.839 THROMBOCYTOSIS, UNSPECIFIED: ICD-10-CM

## 2023-10-24 DIAGNOSIS — Z15.89 GENETIC SUSCEPTIBILITY TO OTHER DISEASE: ICD-10-CM

## 2024-01-14 ENCOUNTER — OUTPATIENT (OUTPATIENT)
Dept: OUTPATIENT SERVICES | Facility: HOSPITAL | Age: 79
LOS: 1 days | Discharge: HOME | End: 2024-01-14

## 2024-01-14 DIAGNOSIS — C50.911 MALIGNANT NEOPLASM OF UNSPECIFIED SITE OF RIGHT FEMALE BREAST: ICD-10-CM

## 2024-01-14 DIAGNOSIS — D45 POLYCYTHEMIA VERA: ICD-10-CM

## 2024-01-14 DIAGNOSIS — Z90.710 ACQUIRED ABSENCE OF BOTH CERVIX AND UTERUS: Chronic | ICD-10-CM

## 2024-01-17 ENCOUNTER — RESULT REVIEW (OUTPATIENT)
Age: 79
End: 2024-01-17

## 2024-01-17 ENCOUNTER — APPOINTMENT (OUTPATIENT)
Dept: HEMATOLOGY ONCOLOGY | Facility: CLINIC | Age: 79
End: 2024-01-17
Payer: MEDICARE

## 2024-01-17 VITALS
DIASTOLIC BLOOD PRESSURE: 77 MMHG | HEART RATE: 75 BPM | HEIGHT: 64 IN | TEMPERATURE: 97.4 F | WEIGHT: 140.19 LBS | OXYGEN SATURATION: 96 % | RESPIRATION RATE: 17 BRPM | BODY MASS INDEX: 23.93 KG/M2 | SYSTOLIC BLOOD PRESSURE: 117 MMHG

## 2024-01-17 DIAGNOSIS — Z15.89 GENETIC SUSCEPTIBILITY TO OTHER DISEASE: ICD-10-CM

## 2024-01-17 DIAGNOSIS — Z51.81 ENCOUNTER FOR THERAPEUTIC DRUG LEVEL MONITORING: ICD-10-CM

## 2024-01-17 LAB
BASOPHILS # BLD AUTO: 0.04 K/UL — SIGNIFICANT CHANGE UP (ref 0–0.2)
BASOPHILS NFR BLD AUTO: 0.9 % — SIGNIFICANT CHANGE UP (ref 0–2)
EOSINOPHIL # BLD AUTO: 0.06 K/UL — SIGNIFICANT CHANGE UP (ref 0–0.5)
EOSINOPHIL NFR BLD AUTO: 1.4 % — SIGNIFICANT CHANGE UP (ref 0–6)
HCT VFR BLD CALC: 43 % — SIGNIFICANT CHANGE UP (ref 34.5–45)
HGB BLD-MCNC: 14.7 G/DL — SIGNIFICANT CHANGE UP (ref 11.5–15.5)
IMM GRANULOCYTES NFR BLD AUTO: 0.2 % — SIGNIFICANT CHANGE UP (ref 0–0.9)
LYMPHOCYTES # BLD AUTO: 1.2 K/UL — SIGNIFICANT CHANGE UP (ref 1–3.3)
LYMPHOCYTES # BLD AUTO: 27.3 % — SIGNIFICANT CHANGE UP (ref 13–44)
MCHC RBC-ENTMCNC: 33.7 PG — SIGNIFICANT CHANGE UP (ref 27–34)
MCHC RBC-ENTMCNC: 34.2 GM/DL — SIGNIFICANT CHANGE UP (ref 32–36)
MCV RBC AUTO: 98.6 FL — SIGNIFICANT CHANGE UP (ref 80–100)
MONOCYTES # BLD AUTO: 0.48 K/UL — SIGNIFICANT CHANGE UP (ref 0–0.9)
MONOCYTES NFR BLD AUTO: 10.9 % — SIGNIFICANT CHANGE UP (ref 2–14)
NEUTROPHILS # BLD AUTO: 2.6 K/UL — SIGNIFICANT CHANGE UP (ref 1.8–7.4)
NEUTROPHILS NFR BLD AUTO: 59.3 % — SIGNIFICANT CHANGE UP (ref 43–77)
NRBC # BLD: 0 /100 WBCS — SIGNIFICANT CHANGE UP (ref 0–0)
PLATELET # BLD AUTO: 383 K/UL — SIGNIFICANT CHANGE UP (ref 150–400)
RBC # BLD: 4.36 M/UL — SIGNIFICANT CHANGE UP (ref 3.8–5.2)
RBC # FLD: 14.6 % — HIGH (ref 10.3–14.5)
WBC # BLD: 4.39 K/UL — SIGNIFICANT CHANGE UP (ref 3.8–10.5)
WBC # FLD AUTO: 4.39 K/UL — SIGNIFICANT CHANGE UP (ref 3.8–10.5)

## 2024-01-17 PROCEDURE — 99213 OFFICE O/P EST LOW 20 MIN: CPT

## 2024-01-17 RX ORDER — DICLOFENAC SODIUM 75 MG/1
75 TABLET, DELAYED RELEASE ORAL
Qty: 60 | Refills: 1 | Status: DISCONTINUED | COMMUNITY
Start: 2023-01-06 | End: 2024-01-17

## 2024-01-17 RX ORDER — HYDROXYUREA 500 MG/1
500 CAPSULE ORAL
Qty: 90 | Refills: 3 | Status: ACTIVE | COMMUNITY
Start: 2020-09-08 | End: 1900-01-01

## 2024-01-17 NOTE — ASSESSMENT
[FreeTextEntry1] : Patient is a 78 y.o. with a PMH of an ER++, HER2-, right sided stage IA breast cancer in 2015 s/p lumpectomy and XRT. Did not tolerate adjuvant hormonal therapy. We are now following her for a JAK2+ P. vera since 2016.  9/2020 - Started Hydrea 500 mg daily.  Counts have been well controlled.   10/23/23 - Noted to have slight decrease in Hgb (Baseline ~ 15 -> 13.9) and increase in platelets (369 -> 469) - was attributed to her recent hip replacement surgery.  Was on (2) ASA for VTE prophylaxis.   She reports she is not taking any ASA at all now - instructed to restart ASA 81mg.  Hgb now back to her baseline today.   Baseline iron studies pending.  Hydrea renewed.  Patient would like to return to her usual schedule of Q6 labs/PE Q6 months.  She does report that her PCP also checks labs in between our visits.    PCP NIKHIL REDDING.

## 2024-01-17 NOTE — PHYSICAL EXAM
[Thin] : thin [Normal] : affect appropriate [de-identified] : anicteric [de-identified] : no c/c/e [de-identified] : limited ROM right hip, using cane [de-identified] : no rashes

## 2024-01-17 NOTE — REVIEW OF SYSTEMS
[Patient Intake Form Reviewed] : Patient intake form was reviewed [Negative] : Heme/Lymph [FreeTextEntry9] : using cane and moving slowly after hip replacement [Cough] : cough [FreeTextEntry8] : frequency [de-identified] : sleep problems

## 2024-01-17 NOTE — HISTORY OF PRESENT ILLNESS
[de-identified] : MEGHAN HEMPHILL is a 78 y.o. with a PMH significant for who we are following for an ER+, HER2- stage IA breast cancer and now a JAK2+ MPD.  5/21/15 - Presented with a 6mm abnormality in the right breast on routine mammo at 6:00.  7/29/15 - Biopsy - well differentiated IDC; ER 90%, W[place] 90%, HER2 +1/neg. 9/9/15 - Right breast lumpectomy and SLND (Ho) for a T1N0 = stage IA breast cancer. s/p adjuvant XRT 11/2015 - Fall 2016 - Adjuvant Arimidex.  She discontinued Arimidex due to musculoskeletal side effects.  She did not want to try any other hormonal therapies.   Routine blood work revealed borderline elevated H/H.   7/9/16 - JAK2 mutation was detected. S/p phlebotomies. Thrombocytosis.  9/2020 - Started Hydrea.  On 500 mg daily. [de-identified] : 10/23/23 - Noted to have slight decrease in Hgb (Baseline ~ 15 -> 13.9) and increase in platelets (369 -> 469) - was attributed to her recent hip replacement surgery 9/28/23.  Was asked to come in at 3 months for repeat labs -> today's visit.  Was on (2) ASA for VTE prophylaxis. Apparently she was told she could stop.  Did not restart baby ASA.  States she has been compliant with her Hydrea.  Overall poor historian.   does not need to take any pain medication at present.  Still smoking ~1PPD.   had COVID recently.  Denies any other changes in her family, medical, or social history since her last visit of 10/23/23.

## 2024-01-18 LAB
ERYTHROCYTE [SEDIMENTATION RATE] IN BLOOD: 39 MM/HR — HIGH (ref 0–20)
FERRITIN SERPL-MCNC: 39 NG/ML — SIGNIFICANT CHANGE UP (ref 13–330)
IRON SATN MFR SERPL: 26 % — SIGNIFICANT CHANGE UP (ref 14–50)
IRON SATN MFR SERPL: 76 UG/DL — SIGNIFICANT CHANGE UP (ref 30–160)
TIBC SERPL-MCNC: 289 UG/DL — SIGNIFICANT CHANGE UP (ref 220–430)
UIBC SERPL-MCNC: 213 UG/DL — SIGNIFICANT CHANGE UP (ref 110–370)

## 2024-02-26 NOTE — ED ADULT NURSE NOTE - NSICDXFAMILYHX_GEN_ALL_CORE_FT
----- Message from Lexi Barrett sent at 2/26/2024  9:44 AM CST -----  Regarding: FW: Appointment Request  Contact: [phone]    ----- Message -----  From: Meli King  Sent: 2/23/2024   4:05 PM CST  To: [facility] Sched Recep Msg Pool  Subject: Appointment Request                              Appointment Request From: Meli King    With Provider: Akash Bennett MD [[facility], [address]]    Preferred Date Range: 3/4/2024 – 3/5/2024    Preferred Times: Any Time    Reason for visit: Injury/Illness    Comments:  A mole on my back that itches  Discuss and possibly get a Shingles vaccine    
Appt scheduled.  
FAMILY HISTORY:  No pertinent family history in first degree relatives

## 2024-03-05 ENCOUNTER — APPOINTMENT (OUTPATIENT)
Dept: ORTHOPEDIC SURGERY | Facility: CLINIC | Age: 79
End: 2024-03-05
Payer: MEDICARE

## 2024-03-05 ENCOUNTER — RESULT CHARGE (OUTPATIENT)
Age: 79
End: 2024-03-05

## 2024-03-05 VITALS
HEART RATE: 71 BPM | WEIGHT: 135 LBS | DIASTOLIC BLOOD PRESSURE: 83 MMHG | SYSTOLIC BLOOD PRESSURE: 151 MMHG | BODY MASS INDEX: 23.05 KG/M2 | HEIGHT: 64 IN

## 2024-03-05 PROCEDURE — 72100 X-RAY EXAM L-S SPINE 2/3 VWS: CPT

## 2024-03-05 PROCEDURE — 99205 OFFICE O/P NEW HI 60 MIN: CPT

## 2024-03-05 NOTE — HISTORY OF PRESENT ILLNESS
[de-identified] :  very pleasant 79-year-old female presents for evaluation and surgical discussion with regard to her longstanding lumbar spinal stenosis patient has been under the care of Dr. Franck Neff multiple MRIs have been reviewed by him as well as pain management.  She presents her office on today for an additional surgical discussion she recently had a consult at  [place] by the spine department and she wants a opinion from's with regard to her known lumbar stenosis one of the largest underlying medical morbidities is greater than 50 years of smoking 1 pack/day patient states she has never tried to quit at this point in time.  Patient has no change in bowel or bladder habits positive neurogenic claudication. [___ yrs] : [unfilled] year(s) ago [Worsening] : worsening [10] : a maximum pain level of 10/10 [Prolonged Standing] : worsened by prolonged standing [Standing] : worsened by standing [Walking] : worsened by walking [Rest] : relieved by rest [Ataxia] : no ataxia [Incontinence] : no incontinence [Loss of Dexterity] : good dexterity [Urinary Ret.] : no urinary retention [de-identified] :   Forward flexion sitting down

## 2024-03-05 NOTE — DISCUSSION/SUMMARY
[de-identified] : Very pleasant surgical conversation was conducted with this woman for a lumbar laminectomy L2-L5.  She is a 1+ pack smoker for 50 years that will have to be completely rectified prior to any surgical definitive planning etc.  She also need medical clearances shooting pulmonology clearances etc.  Step #1 would be a  smoking cessation consult see if patient is going to be compliant with smoking cessation if so over the next month she will then commence medical clearances after that and if nicotine levels are negative we can continue with surgical conversation surgical planning etc.  Risks benefits questions comments concerns particular with smoking wound complications persistent back pain failed spinal surgery etc.  A long discussion was had with the patient regarding surgical plan. Patient is aware that surgery is elective in nature and is choosing to proceed with surgery. Risks, benefits, alternatives were discussed and all questions, comments and concerns were answered to the patient's satisfaction. The statistical probability of improvement was discussed at length as well as post surgical course.  Literature was provided regarding the specific type of surgery as well as a written surgical consent which the patient will need to sign and return to the office prior to surgical date.   If patient is a smoker, discontinuation of smoking was advised and must be accomplished 6-8 weeks prior to surgery date.  Patient was advised that help with quitting smoking is available through [facility] Smoker's Quit Line and phone number/website was provided, or patient can ask assistance from primary care provider.  Elective surgery will not be performed unless patient complies with this policy.   Based upon age and smoking status I believe this is a complex patient.  Patient with multiple medical comorbidity increasing the risk of perioperative and postoperative complications as well as diminished spine outcomes as per the current medical literature. These include but are not limited to obesity, anxiety/depression, cardiac illness, kidney disease, peripheral vascular disease, history of cancer, COPD, dysmetabolic syndrome including but not limited to diabetes, hyperlipidemia, hypertension. Patient is being referred back to primary care provider for medical optimization, as well as other appropriate specialists as needed for optimization prior to spine surgery.

## 2024-03-05 NOTE — PHYSICAL EXAM
[Antalgic] : antalgic [Stooped] : stooped [de-identified] : CONSTITUTIONAL: The patient is a very pleasant individual who is well-nourished and who appears stated age. PSYCHIATRIC: The patient is alert and oriented X 3 and in no apparent distress, and participates with orthopedic evaluation well. HEAD: Atraumatic and is nonsyndromic in appearance. EENT: No visible thyromegaly, EOMI. RESPIRATORY: Respiratory rate is regular, not dyspneic on examination. LYMPHATICS: There is no inguinal lymphadenopathy INTEGUMENTARY: Skin is clean, dry, and intact about the bilateral lower extremities and lumbar spine. VASCULAR: There is brisk capillary refill about the bilateral lower extremities. NEUROLOGIC: There are no pathologic reflexes. There is no decrease in sensation of the bilateral lower extremities on manual  examination. Deep tendon reflexes are well maintained at 2+/4 of the bilateral lower extremities and are symmetric.. MUSCULOSKELETAL: There is no visible muscular atrophy. Manual motor strength is well maintained in the bilateral lower extremities. Range of motion of lumbar spine is well maintained. The patient ambulates in a non-myelopathic manner. Negative tension sign and straight leg raise bilaterally. Quad extension, ankle dorsiflexion, EHL, plantar flexion, and ankle eversion are well preserved. Normal secondary orthopaedic exam of bilateral hips, greater trochanteric area, knees and ankles,  severe neurogenic claudication,  mechanically orientated low back pain [de-identified] : 2 views of the lumbar spine taken on today's date of March 5, 2024 demonstrates age-appropriate lumbar degenerative disc disease essentially from L2 [id number] and 5 1.  These are compared to x-rays from 2022 and 2021 with very similar findings since the first x-rays in 2021 she is now status post bilateral hip replacements.  Bone scan has been reviewed from [facility] demonstrating radiotracer activity in the lumbar and right acetabulum consistent with degenerative changes.  MRI of the lumbar spine from September 2021 has been reviewed demonstrates moderate to severe spinal stenosis L2-3 through L5-S1.  MRI was obtained at [facility]

## 2024-03-25 RX ORDER — OXYBUTYNIN CHLORIDE 10 MG/1
10 TABLET, EXTENDED RELEASE ORAL
Qty: 90 | Refills: 1 | Status: ACTIVE | COMMUNITY
Start: 2023-08-24 | End: 1900-01-01

## 2024-03-26 ENCOUNTER — APPOINTMENT (OUTPATIENT)
Dept: ORTHOPEDIC SURGERY | Facility: CLINIC | Age: 79
End: 2024-03-26
Payer: MEDICARE

## 2024-03-26 VITALS
WEIGHT: 135 LBS | SYSTOLIC BLOOD PRESSURE: 144 MMHG | HEIGHT: 64 IN | HEART RATE: 74 BPM | DIASTOLIC BLOOD PRESSURE: 84 MMHG | BODY MASS INDEX: 23.05 KG/M2

## 2024-03-26 DIAGNOSIS — M51.36 OTHER INTERVERTEBRAL DISC DEGENERATION, LUMBAR REGION: ICD-10-CM

## 2024-03-26 PROCEDURE — 99215 OFFICE O/P EST HI 40 MIN: CPT

## 2024-03-26 NOTE — DISCUSSION/SUMMARY
[de-identified] : Very pleasant surgical conversation was conducted with this woman and her daughter with regard to a multilevel L2-L5 lumbar laminectomy I have discussed based upon her age of 79 a 50+ year history of smoking underlying COPD that she is I would say falling into a high risk category she is obviously going to need medical pulmonary clearances prior to surgical undertaking.  We reviewed her MRIs reviewed x-rays I think limiting this to a laminectomy is reasonable she also has back pain mechanically orientated back pain but I would not recommend nor would I entertain doing a multilevel instrumented fusion and interbody's etc.  Patient is can to follow-up for an additional surgical conversation in approximately 3 to 4 weeks we will going to check on her smoking cessation progress she states she is cutting down at this point going forward as she would now commencing with surgical planning patient is aware and knows that she needs to be 100% tobacco free.  I will reconfirm that her BEBA questionnaire and cervical exam is negative at next clinical assessment based upon her severe spinal stenosis will make sure there is no cervical stenosis prior to an operative event as mentioned she is going to be following up in approximately 3 weeks.  Patient with multiple medical comorbidity increasing the risk of perioperative and postoperative complications as well as diminished spine outcomes as per the current medical literature. These include but are not limited to obesity, anxiety/depression, cardiac illness, kidney disease, peripheral vascular disease, history of cancer, COPD, dysmetabolic syndrome including but not limited to diabetes, hyperlipidemia, hypertension. Patient is being referred back to primary care provider for medical optimization, as well as other appropriate specialists as needed for optimization prior to spine surgery.

## 2024-03-26 NOTE — HISTORY OF PRESENT ILLNESS
[de-identified] : Lorena is a very pleasant 79-year-old female with a longstanding history of lumbar spinal stenosis.  She been an active smoker for many many years she presents for a surgical conversation with regard to an L2-L5 lumbar laminectomy she is accompanied by her family appears that this is a primary a neurogenic claudication focus she does complain of back pain which I stated I will not focus on back pain I will not perform instrumented spinal fusions on this type of presentation.  She also has focal left gluteal tendinopathy again a minimal focus of this operation I will be treated more with rehabilitation afterwards [Worsening] : worsening [___ yrs] : [unfilled] year(s) ago [1] : a current pain level of 1/10 [10] : a maximum pain level of 10/10 [Prolonged Standing] : worsened by prolonged standing [Standing] : worsened by standing [Weight Bearing] : worsened by weight bearing [Walking] : worsened by walking [Rest] : relieved by rest [Incontinence] : no incontinence [Ataxia] : no ataxia [Loss of Dexterity] : good dexterity [Urinary Ret.] : no urinary retention [de-identified] : Standing [de-identified] : Forward flexion sitting down sleeping in the fetal position

## 2024-03-26 NOTE — PHYSICAL EXAM
[Stooped] : stooped [Antalgic] : antalgic [de-identified] : Previous lumbar x-rays as well as a previous MRI have been reviewed showing significant lumbar spondylosis essentially bone-on-bone orientation throughout the lumbar elements MRI shows severe spinal stenosis from L2-3 through L5-S1. [de-identified] : CONSTITUTIONAL: The patient is a very pleasant individual who is well-nourished and who appears stated age. PSYCHIATRIC: The patient is alert and oriented X 3 and in no apparent distress, and participates with orthopedic evaluation well. HEAD: Atraumatic and is nonsyndromic in appearance. EENT: No visible thyromegaly, EOMI. RESPIRATORY: Respiratory rate is regular, not dyspneic on examination. LYMPHATICS: There is no inguinal lymphadenopathy INTEGUMENTARY: Skin is clean, dry, and intact about the bilateral lower extremities and lumbar spine. VASCULAR: There is brisk capillary refill about the bilateral lower extremities. NEUROLOGIC: There are no pathologic reflexes. There is a decrease in sensation of the bilateral lower extremities on manual  examination, I believe with a subtle neuropathy consistent with age and possibly even her smoking history. Deep tendon reflexes are well maintained at 2+/4 of the bilateral lower extremities and are symmetric.. MUSCULOSKELETAL: There is no visible muscular atrophy. Manual motor strength is well maintained in the bilateral lower extremities. Range of motion of lumbar spine is well maintained. The patient ambulates in a non-myelopathic manner. Negative tension sign and straight leg raise bilaterally. Quad extension, ankle dorsiflexion, EHL, plantar flexion, and ankle eversion are well preserved. Normal secondary orthopaedic exam of bilateral hips, greater trochanteric area, knees and ankles, physical exam is consistent primarily with neurogenic claudication.

## 2024-04-16 ENCOUNTER — APPOINTMENT (OUTPATIENT)
Dept: ORTHOPEDIC SURGERY | Facility: CLINIC | Age: 79
End: 2024-04-16
Payer: MEDICARE

## 2024-04-16 VITALS
HEIGHT: 64 IN | DIASTOLIC BLOOD PRESSURE: 80 MMHG | HEART RATE: 65 BPM | BODY MASS INDEX: 23.05 KG/M2 | WEIGHT: 135 LBS | SYSTOLIC BLOOD PRESSURE: 125 MMHG

## 2024-04-16 DIAGNOSIS — K21.9 GASTRO-ESOPHAGEAL REFLUX DISEASE W/OUT ESOPHAGITIS: ICD-10-CM

## 2024-04-16 DIAGNOSIS — Z86.59 PERSONAL HISTORY OF OTHER MENTAL AND BEHAVIORAL DISORDERS: ICD-10-CM

## 2024-04-16 PROCEDURE — 99214 OFFICE O/P EST MOD 30 MIN: CPT

## 2024-04-16 NOTE — DISCUSSION/SUMMARY
[de-identified] : A long discussion was had with the patient regarding Lumbar surgical plan of laminectomy L2-L5. Anatomic models, Xrays, CT scans/MRI's were utilized to provide a firm understanding of their surgical plan. Patient is aware that surgery is elective in nature and he/she choosing to proceed with surgery. Risks, benefits, alternatives were discussed and all questions, comments and concerns were encouraged and answered to the patient's satisfaction. The statistical probability of improvement was discussed at length as well as post surgical course. Literature from [employer] spine society was provided to the patient regarding the specific type of surgery as well as a 5 page written surgical consent which the patient will need to sign and return to the office prior to surgical date. Consent forms highlight specific complications related to the complex nature of spinal surgery   Risks of lumbar surgery include: persistent pain, adjacent segment disease (which will require more surgery in future), dural tears, neurologic injury, and wound healing complications   Benefits of lumbar surgery include Improved neurologic and pain score   We also discussed with the patient complications of incisions directly related to obesity, diabetes, previous wound complications or post-surgical wound infections, smoking, neuropathy, and chronic anticoagulation. This risk has been specifically discussed and the patient will discuss modifiable risk factors to be optimized prior to surgical management. A multimodality approach of primary care physician, and medicine subspecialist will be utilized to optimize medical risk factors. Patient is aware that this is not a surgery to help back pain but is a surgery rather to help decrease/eliminate pain in the legs with walking and standing. She has not yet quit smoking, I did let her know that there is a 0 tolerance policy and that nicotine levels will be checked at presurgical testing.  I let her know all the reasons why we do not do spinal surgery in the face of cigarette smoking and she is understanding of the situation.  She was encouraged to get assistance from primary care provider to stop smoking.  I am understanding that with a history of bipolar disorder it is very hard to quit smoking however we first want to do no harm and need her to quit smoking in order to proceed with surgery. She had the opportunity to ask questions and all her questions comments and concerns were answered to her satisfaction she is invited to call back if she has any further questions. Patient has history of GERD we will not be prescribing any anti-inflammatory medications but she can continue Tylenol 1000 mg 3 times daily as needed for pain.  We did have a long discussion regarding alternative treatments for lumbar stenosis with neurogenic claudication.  I let her know there is no actual medicine that can help on a long-term basis to decrease or eliminate her symptoms but that she can take intermittent steroids although that can only be done 4 times a year and may eventually lose effectiveness.  She can consider pain management injections and continued rehabilitation however it is unlikely to eliminate her symptoms of spinal canal stenosis. Spine, physical exam findings and BEBA questionnaire is negative and patient does not wish to proceed with any further investigation regarding her cervical spine unless symptoms ensue.  I think this is reasonable.

## 2024-04-16 NOTE — REASON FOR VISIT
Sent to [facility] to fax new script to school. Included the diastat 7.5mg. per dr. Pope- she doesn't need it but doesn't hurt to have   [Follow-Up Visit] : a follow-up visit for [Back Pain] : back pain [Other: _____] : [unfilled]

## 2024-04-16 NOTE — PHYSICAL EXAM
[de-identified] : CONSTITUTIONAL: The patient is a very pleasant individual who is well-nourished and who appears stated age. PSYCHIATRIC: The patient is alert and oriented X 3 and in no apparent distress, and participates with orthopedic evaluation well. HEAD: Atraumatic and is nonsyndromic in appearance. EENT: No visible thyromegaly, EOMI. RESPIRATORY: Respiratory rate is regular, not dyspneic on examination. LYMPHATICS: There is no inguinal lymphadenopathy INTEGUMENTARY: Skin is clean, dry, and intact about the bilateral lower extremities and lumbar spine. VASCULAR: There is brisk capillary refill about the bilateral lower extremities. NEUROLOGIC: There are no pathologic reflexes, no clonus, negative Sherine sign, no hyperreflexia specifically.  There is no decrease in sensation of the upper extremities manually.. There is a decrease in sensation of the bilateral lower extremities on manual examination, I believe with a subtle neuropathy consistent with age and possibly even her smoking history. Deep tendon reflexes are well maintained at 2+/4 of the bilateral lower extremities and are symmetric.. MUSCULOSKELETAL: There is no visible muscular atrophy. Manual motor strength is well maintained in the bilateral lower extremities. Range of motion of lumbar and cervical spine is well maintained. The patient ambulates in a non-myelopathic manner. Negative tension sign and straight leg raise bilaterally. Quad extension, ankle dorsiflexion, EHL, plantar flexion, and ankle eversion are well preserved. Normal secondary orthopaedic exam of bilateral hips, greater trochanteric area, knees and ankles, physical exam is consistent primarily with neurogenic claudication.   Musculoskeletal: antalgic and stooped.   [de-identified] : Previous lumbar x-rays as well as a previous MRI have been reviewed showing significant lumbar spondylosis essentially bone-on-bone orientation throughout the lumbar elements MRI shows severe spinal stenosis from L2-3 through L5-S1.

## 2024-04-16 NOTE — HISTORY OF PRESENT ILLNESS
[de-identified] : 79-year-old female has questions about upcoming laminectomy surgery.  She is attendance with her daughter.  Patient denies being off balance frequent falls decrease in penmanship inability to button or zipper her garments and has no neck pain or radiating pain down the arms. Lorena is a very pleasant 79-year-old female with a longstanding history of lumbar spinal stenosis. She been an active smoker for many many years she presents for a surgical conversation with regard to an L2-L5 lumbar laminectomy she is accompanied by her family appears that this is a primary a neurogenic claudication focus she does complain of back pain which I stated I will not focus on back pain I will not perform instrumented spinal fusions on this type of presentation. She also has focal left gluteal tendinopathy again a minimal focus of this operation I will be treated more with rehabilitation afterwards    She states the symptoms are no worse than on her last visit. The patient mentions the symptoms started  year(s) ago. Pain levels include a current pain level of 1/10, a maximum pain level of 10/10 and Standing. Her symptoms occur while standing.      Modifying factors - worsened by prolonged standing, worsened by standing, worsened by walking and worsened by weight bearing. Relieving factors include relieved by rest . Forward flexion sitting down sleeping in the fetal position.   Associated Symptoms: no ataxia, no incontinence, good dexterity and no urinary retention.

## 2024-04-18 ENCOUNTER — OUTPATIENT (OUTPATIENT)
Dept: OUTPATIENT SERVICES | Facility: HOSPITAL | Age: 79
LOS: 1 days | End: 2024-04-18
Payer: MEDICARE

## 2024-04-18 VITALS
TEMPERATURE: 97 F | OXYGEN SATURATION: 99 % | HEIGHT: 64 IN | SYSTOLIC BLOOD PRESSURE: 140 MMHG | RESPIRATION RATE: 20 BRPM | HEART RATE: 72 BPM | WEIGHT: 142.2 LBS | DIASTOLIC BLOOD PRESSURE: 70 MMHG

## 2024-04-18 DIAGNOSIS — Z90.710 ACQUIRED ABSENCE OF BOTH CERVIX AND UTERUS: Chronic | ICD-10-CM

## 2024-04-18 DIAGNOSIS — Z87.39 PERSONAL HISTORY OF OTHER DISEASES OF THE MUSCULOSKELETAL SYSTEM AND CONNECTIVE TISSUE: ICD-10-CM

## 2024-04-18 DIAGNOSIS — Z90.49 ACQUIRED ABSENCE OF OTHER SPECIFIED PARTS OF DIGESTIVE TRACT: Chronic | ICD-10-CM

## 2024-04-18 DIAGNOSIS — I10 ESSENTIAL (PRIMARY) HYPERTENSION: ICD-10-CM

## 2024-04-18 DIAGNOSIS — Z29.9 ENCOUNTER FOR PROPHYLACTIC MEASURES, UNSPECIFIED: ICD-10-CM

## 2024-04-18 DIAGNOSIS — Z01.818 ENCOUNTER FOR OTHER PREPROCEDURAL EXAMINATION: ICD-10-CM

## 2024-04-18 DIAGNOSIS — F31.9 BIPOLAR DISORDER, UNSPECIFIED: ICD-10-CM

## 2024-04-18 DIAGNOSIS — Z98.890 OTHER SPECIFIED POSTPROCEDURAL STATES: Chronic | ICD-10-CM

## 2024-04-18 DIAGNOSIS — H26.9 UNSPECIFIED CATARACT: Chronic | ICD-10-CM

## 2024-04-18 LAB
A1C WITH ESTIMATED AVERAGE GLUCOSE RESULT: 5.1 % — SIGNIFICANT CHANGE UP (ref 4–5.6)
ANION GAP SERPL CALC-SCNC: 11 MMOL/L — SIGNIFICANT CHANGE UP (ref 5–17)
APTT BLD: 35.7 SEC — HIGH (ref 24.5–35.6)
BASOPHILS # BLD AUTO: 0.05 K/UL — SIGNIFICANT CHANGE UP (ref 0–0.2)
BASOPHILS NFR BLD AUTO: 1 % — SIGNIFICANT CHANGE UP (ref 0–2)
BLD GP AB SCN SERPL QL: SIGNIFICANT CHANGE UP
BUN SERPL-MCNC: 11 MG/DL — SIGNIFICANT CHANGE UP (ref 8–20)
CALCIUM SERPL-MCNC: 9.9 MG/DL — SIGNIFICANT CHANGE UP (ref 8.4–10.5)
CHLORIDE SERPL-SCNC: 94 MMOL/L — LOW (ref 96–108)
CO2 SERPL-SCNC: 27 MMOL/L — SIGNIFICANT CHANGE UP (ref 22–29)
CREAT SERPL-MCNC: 0.5 MG/DL — SIGNIFICANT CHANGE UP (ref 0.5–1.3)
EGFR: 95 ML/MIN/1.73M2 — SIGNIFICANT CHANGE UP
EOSINOPHIL # BLD AUTO: 0.11 K/UL — SIGNIFICANT CHANGE UP (ref 0–0.5)
EOSINOPHIL NFR BLD AUTO: 2.2 % — SIGNIFICANT CHANGE UP (ref 0–6)
ESTIMATED AVERAGE GLUCOSE: 100 MG/DL — SIGNIFICANT CHANGE UP (ref 68–114)
GLUCOSE SERPL-MCNC: 86 MG/DL — SIGNIFICANT CHANGE UP (ref 70–99)
HCT VFR BLD CALC: 45.4 % — HIGH (ref 34.5–45)
HGB BLD-MCNC: 15.5 G/DL — SIGNIFICANT CHANGE UP (ref 11.5–15.5)
IMM GRANULOCYTES NFR BLD AUTO: 0.4 % — SIGNIFICANT CHANGE UP (ref 0–0.9)
INR BLD: 0.97 RATIO — SIGNIFICANT CHANGE UP (ref 0.85–1.18)
LYMPHOCYTES # BLD AUTO: 1.37 K/UL — SIGNIFICANT CHANGE UP (ref 1–3.3)
LYMPHOCYTES # BLD AUTO: 27.4 % — SIGNIFICANT CHANGE UP (ref 13–44)
MCHC RBC-ENTMCNC: 34.1 GM/DL — SIGNIFICANT CHANGE UP (ref 32–36)
MCHC RBC-ENTMCNC: 34.4 PG — HIGH (ref 27–34)
MCV RBC AUTO: 100.9 FL — HIGH (ref 80–100)
MONOCYTES # BLD AUTO: 0.46 K/UL — SIGNIFICANT CHANGE UP (ref 0–0.9)
MONOCYTES NFR BLD AUTO: 9.2 % — SIGNIFICANT CHANGE UP (ref 2–14)
MRSA PCR RESULT.: SIGNIFICANT CHANGE UP
NEUTROPHILS # BLD AUTO: 2.99 K/UL — SIGNIFICANT CHANGE UP (ref 1.8–7.4)
NEUTROPHILS NFR BLD AUTO: 59.8 % — SIGNIFICANT CHANGE UP (ref 43–77)
PLATELET # BLD AUTO: 408 K/UL — HIGH (ref 150–400)
POTASSIUM SERPL-MCNC: 4.8 MMOL/L — SIGNIFICANT CHANGE UP (ref 3.5–5.3)
POTASSIUM SERPL-SCNC: 4.8 MMOL/L — SIGNIFICANT CHANGE UP (ref 3.5–5.3)
PROTHROM AB SERPL-ACNC: 10.8 SEC — SIGNIFICANT CHANGE UP (ref 9.5–13)
RBC # BLD: 4.5 M/UL — SIGNIFICANT CHANGE UP (ref 3.8–5.2)
RBC # FLD: 13.4 % — SIGNIFICANT CHANGE UP (ref 10.3–14.5)
S AUREUS DNA NOSE QL NAA+PROBE: SIGNIFICANT CHANGE UP
SODIUM SERPL-SCNC: 132 MMOL/L — LOW (ref 135–145)
WBC # BLD: 5 K/UL — SIGNIFICANT CHANGE UP (ref 3.8–10.5)
WBC # FLD AUTO: 5 K/UL — SIGNIFICANT CHANGE UP (ref 3.8–10.5)

## 2024-04-18 PROCEDURE — 71046 X-RAY EXAM CHEST 2 VIEWS: CPT | Mod: 26

## 2024-04-18 PROCEDURE — G0463: CPT

## 2024-04-18 PROCEDURE — 71046 X-RAY EXAM CHEST 2 VIEWS: CPT

## 2024-04-18 RX ORDER — ASPIRIN/CALCIUM CARB/MAGNESIUM 324 MG
1 TABLET ORAL
Qty: 0 | Refills: 0 | DISCHARGE

## 2024-04-18 RX ORDER — ALENDRONATE SODIUM 70 MG/1
1 TABLET ORAL
Qty: 0 | Refills: 0 | DISCHARGE

## 2024-04-18 RX ORDER — GABAPENTIN 400 MG/1
1 CAPSULE ORAL
Qty: 0 | Refills: 0 | DISCHARGE

## 2024-04-18 RX ORDER — OXYBUTYNIN CHLORIDE 5 MG
1 TABLET ORAL
Refills: 0 | DISCHARGE

## 2024-04-18 NOTE — H&P PST ADULT - PSYCHIATRIC COMMENTS
bipolar on meds and follows with psych feeling well denies thoughts of harming self on meds for bipolar , follows with psych appears pleasant denies harmful thoughts

## 2024-04-18 NOTE — H&P PST ADULT - NSICDXFAMILYHX_GEN_ALL_CORE_FT
FAMILY HISTORY:  Mother  Still living? Unknown  FH: myocardial infarction, Age at diagnosis: Age Unknown    Child  Still living? Unknown  Family history of breast cancer, Age at diagnosis: Age Unknown

## 2024-04-18 NOTE — H&P PST ADULT - NSICDXPASTSURGICALHX_GEN_ALL_CORE_FT
PAST SURGICAL HISTORY:  Cataract     S/P appendectomy     S/P breast lumpectomy     S/P hysterectomy

## 2024-04-18 NOTE — H&P PST ADULT - HISTORY OF PRESENT ILLNESS
79 yr old female presents with h/o BIpolar, GERD , Right breast cancer s/p lumpectomy , COPD, and c/o lower back pain that radiates to the left leg worse with walking , standing and stairs , . Started about 2 yrs ago has become worse . MRI done and lumbar spinal stenosis noted, denies any numbness, ambulates without assistance uses cane at times , feels like a dull ache denies taking diclofenac  with little relief. usually 6-8 /10 with activity. Wakes at night with discomfort to leg . Pt is scheduled for  L2-L3-L4-L5 laminectomy with Dr. Howard on 5/8/24 . Medical and cardiac clearances to be obtained. Psych note from Dr. Hutchinson requested by surgeon. Spine booklet provided, ERP protocol reviewed, MSSA/MRSA swab done in pst, results pending.

## 2024-04-18 NOTE — H&P PST ADULT - NSICDXPASTMEDICALHX_GEN_ALL_CORE_FT
PAST MEDICAL HISTORY:  Bipolar 1 disorder     Breast cancer     Chronic obstructive pulmonary disease (COPD)     Depression     Lumbar stenosis     Polycythemia vera

## 2024-04-18 NOTE — H&P PST ADULT - MUSCULOSKELETAL COMMENTS
left leg sciatica pain maria t legs equal in strength c/o left leg pain with extension ambulating with steady gait

## 2024-04-18 NOTE — H&P PST ADULT - PROBLEM SELECTOR PLAN 2
L2-L3-L4-L5 LAMINECTOMY WITH DR. FREY  HOLD ASA/ NSAIDS 1 WEEK PRE OP  medical and cardiac clearances pending

## 2024-04-18 NOTE — H&P PST ADULT - PROBLEM SELECTOR PLAN 1
olanzapine, trazadone and fluoxetene as prescribed   Psych note from DR. Hutchinson requested by surgeon

## 2024-04-18 NOTE — H&P PST ADULT - ASSESSMENT
79 yr old female presents with h/o BIpolar, GERD , Right breast cancer s/p lumpectomy , COPD, and c/o lower back pain that radiates to the left leg worse with walking , standing and stairs , . Started about 2 yrs ago has become worse . MRI done and lumbar spinal stenosis noted, denies any numbness, ambulates without assistance uses cane at times , feels like a dull ache denies taking diclofenac  with little relief. usually 6-8 /10 with activity. Wakes at night with discomfort to leg . Pt is scheduled for  L2-L3-L4-L5 laminectomy with Dr. Howard on 24 . Medical and cardiac clearances to be obtained. Psych note from Dr. Hutchinson requested by surgeon. Spine booklet provided, ERP protocol reviewed, MSSA/MRSA swab done in pst, results pending.  OPIOID RISK TOOL    ZHANNA EACH BOX THAT APPLIES AND ADD TOTALS AT THE END    FAMILY HISTORY OF SUBSTANCE ABUSE                 FEMALE         MALE                                                Alcohol                             [  ]1 pt          [  ]3pts                                               Illegal Durgs                     [  ]2 pts        [  ]3pts                                               Rx Drugs                           [  ]4 pts        [  ]4 pts    PERSONAL HISTORY OF SUBSTANCE ABUSE                                                                                          Alcohol                             [  ]3 pts       [  ]3 pts                                               Illegal Durgs                     [  ]4 pts        [  ]4 pts                                               Rx Drugs                           [  ]5 pts        [  ]5 pts    AGE BETWEEN 16-45 YEARS                                      [  ]1 pt         [  ]1 pt    HISTORY OF PREADOLESCENT   SEXUAL ABUSE                                                             [  ]3 pts        [  ]0pts    PSYCHOLOGICAL DISEASE                     ADD, OCD, Bipolar, Schizophrenia        [ X ]2 pts         [  ]2 pts                      Depression                                               [  ]1 pt           [  ]1 pt           SCORING TOTAL   (add numbers and type here)              (**2*)                                     A score of 3 or lower indicated LOW risk for future opiod abuse  A score of 4 to 7 indicated moderate risk for future opiod abuse  A score of 8 or higher indicates a high risk for opiod abuse    CAPRINI VTE 2.0 SCORE [CLOT updated 2019]    AGE RELATED RISK FACTORS                                                       MOBILITY RELATED FACTORS  [ ] Age 41-60 years                                            (1 Point)                    [ ] Bed rest                                                        (1 Point)  [ ] Age: 61-74 years                                           (2 Points)                  [ ] Plaster cast                                                   (2 Points)  [X ] Age= 75 years                                              (3 Points)                    [ ] Bed bound for more than 72 hours                 (2 Points)    DISEASE RELATED RISK FACTORS                                               GENDER SPECIFIC FACTORS  [ ] Edema in the lower extremities                       (1 Point)              [ ] Pregnancy                                                     (1 Point)  [ ] Varicose veins                                               (1 Point)                     [ ] Post-partum < 6 weeks                                   (1 Point)             [ X] BMI > 25 Kg/m2                                            (1 Point)                     [ ] Hormonal therapy  or oral contraception          (1 Point)                 [ ] Sepsis (in the previous month)                        (1 Point)               [ ] History of pregnancy complications                 (1 point)  [ ] Pneumonia or serious lung disease                                               [ ] Unexplained or recurrent                     (1 Point)           (in the previous month)                               (1 Point)  [ ] Abnormal pulmonary function test                     (1 Point)                 SURGERY RELATED RISK FACTORS  [ ] Acute myocardial infarction                              (1 Point)               [ ]  Section                                             (1 Point)  [ ] Congestive heart failure (in the previous month)  (1 Point)      [ ] Minor surgery                                                  (1 Point)   [ ] Inflammatory bowel disease                             (1 Point)               [ ] Arthroscopic surgery                                        (2 Points)  [ ] Central venous access                                      (2 Points)                [X ] General surgery lasting more than 45 minutes (2 points)  [ ] Malignancy- Present or previous                   (2 Points)                [ ] Elective arthroplasty                                         (5 points)    [ ] Stroke (in the previous month)                          (5 Points)                                                                                                                                                           HEMATOLOGY RELATED FACTORS                                                 TRAUMA RELATED RISK FACTORS  [ ] Prior episodes of VTE                                     (3 Points)                [ ] Fracture of the hip, pelvis, or leg                       (5 Points)  [ ] Positive family history for VTE                         (3 Points)             [ ] Acute spinal cord injury (in the previous month)  (5 Points)  [ ] Prothrombin 50527 A                                     (3 Points)               [ ] Paralysis  (less than 1 month)                             (5 Points)  [ ] Factor V Leiden                                             (3 Points)                  [ ] Multiple Trauma within 1 month                        (5 Points)  [ ] Lupus anticoagulants                                     (3 Points)                                                           [ ] Anticardiolipin antibodies                               (3 Points)                                                       [ ] High homocysteine in the blood                      (3 Points)                                             [ ] Other congenital or acquired thrombophilia      (3 Points)                                                [ ] Heparin induced thrombocytopenia                  (3 Points)                                     Total Score [     6     ]

## 2024-04-21 LAB
ANABASINE UR-MCNC: <2 NG/ML — SIGNIFICANT CHANGE UP
COTININE UR-MCNC: 307 NG/ML — HIGH
NICOTINE UR-MCNC: 30 NG/ML — HIGH
NORNICOTINE UR-MCNC: 2.4 NG/ML — HIGH

## 2024-05-08 ENCOUNTER — APPOINTMENT (OUTPATIENT)
Dept: ORTHOPEDIC SURGERY | Facility: HOSPITAL | Age: 79
End: 2024-05-08

## 2024-05-08 PROBLEM — J44.9 CHRONIC OBSTRUCTIVE PULMONARY DISEASE, UNSPECIFIED: Chronic | Status: ACTIVE | Noted: 2024-04-18

## 2024-05-08 PROBLEM — C50.919 MALIGNANT NEOPLASM OF UNSPECIFIED SITE OF UNSPECIFIED FEMALE BREAST: Chronic | Status: ACTIVE | Noted: 2024-04-18

## 2024-05-08 PROBLEM — M48.061 SPINAL STENOSIS, LUMBAR REGION WITHOUT NEUROGENIC CLAUDICATION: Chronic | Status: ACTIVE | Noted: 2024-04-18

## 2024-05-08 PROBLEM — D45 POLYCYTHEMIA VERA: Chronic | Status: ACTIVE | Noted: 2024-04-18

## 2024-05-15 ENCOUNTER — OUTPATIENT (OUTPATIENT)
Dept: OUTPATIENT SERVICES | Facility: HOSPITAL | Age: 79
LOS: 1 days | End: 2024-05-15
Payer: MEDICARE

## 2024-05-15 VITALS
RESPIRATION RATE: 16 BRPM | HEART RATE: 70 BPM | HEIGHT: 63 IN | SYSTOLIC BLOOD PRESSURE: 154 MMHG | WEIGHT: 143.3 LBS | DIASTOLIC BLOOD PRESSURE: 83 MMHG | OXYGEN SATURATION: 95 % | TEMPERATURE: 97 F

## 2024-05-15 DIAGNOSIS — Z90.49 ACQUIRED ABSENCE OF OTHER SPECIFIED PARTS OF DIGESTIVE TRACT: Chronic | ICD-10-CM

## 2024-05-15 DIAGNOSIS — J44.9 CHRONIC OBSTRUCTIVE PULMONARY DISEASE, UNSPECIFIED: ICD-10-CM

## 2024-05-15 DIAGNOSIS — M48.07 SPINAL STENOSIS, LUMBOSACRAL REGION: ICD-10-CM

## 2024-05-15 DIAGNOSIS — E78.5 HYPERLIPIDEMIA, UNSPECIFIED: ICD-10-CM

## 2024-05-15 DIAGNOSIS — D45 POLYCYTHEMIA VERA: ICD-10-CM

## 2024-05-15 DIAGNOSIS — Z01.818 ENCOUNTER FOR OTHER PREPROCEDURAL EXAMINATION: ICD-10-CM

## 2024-05-15 DIAGNOSIS — Z96.642 PRESENCE OF LEFT ARTIFICIAL HIP JOINT: Chronic | ICD-10-CM

## 2024-05-15 DIAGNOSIS — Z29.9 ENCOUNTER FOR PROPHYLACTIC MEASURES, UNSPECIFIED: ICD-10-CM

## 2024-05-15 DIAGNOSIS — Z98.890 OTHER SPECIFIED POSTPROCEDURAL STATES: Chronic | ICD-10-CM

## 2024-05-15 DIAGNOSIS — Z96.641 PRESENCE OF RIGHT ARTIFICIAL HIP JOINT: Chronic | ICD-10-CM

## 2024-05-15 DIAGNOSIS — H26.9 UNSPECIFIED CATARACT: Chronic | ICD-10-CM

## 2024-05-15 DIAGNOSIS — C50.919 MALIGNANT NEOPLASM OF UNSPECIFIED SITE OF UNSPECIFIED FEMALE BREAST: ICD-10-CM

## 2024-05-15 DIAGNOSIS — Z90.710 ACQUIRED ABSENCE OF BOTH CERVIX AND UTERUS: Chronic | ICD-10-CM

## 2024-05-15 DIAGNOSIS — I10 ESSENTIAL (PRIMARY) HYPERTENSION: ICD-10-CM

## 2024-05-15 DIAGNOSIS — Z91.89 OTHER SPECIFIED PERSONAL RISK FACTORS, NOT ELSEWHERE CLASSIFIED: ICD-10-CM

## 2024-05-15 LAB
A1C WITH ESTIMATED AVERAGE GLUCOSE RESULT: 5.2 % — SIGNIFICANT CHANGE UP (ref 4–5.6)
ALBUMIN SERPL ELPH-MCNC: 4.2 G/DL — SIGNIFICANT CHANGE UP (ref 3.3–5.2)
ALP SERPL-CCNC: 70 U/L — SIGNIFICANT CHANGE UP (ref 40–120)
ALT FLD-CCNC: 9 U/L — SIGNIFICANT CHANGE UP
ANION GAP SERPL CALC-SCNC: 10 MMOL/L — SIGNIFICANT CHANGE UP (ref 5–17)
APTT BLD: 29 SEC — SIGNIFICANT CHANGE UP (ref 24.5–35.6)
AST SERPL-CCNC: 15 U/L — SIGNIFICANT CHANGE UP
BASOPHILS # BLD AUTO: 0.07 K/UL — SIGNIFICANT CHANGE UP (ref 0–0.2)
BASOPHILS NFR BLD AUTO: 1 % — SIGNIFICANT CHANGE UP (ref 0–2)
BILIRUB SERPL-MCNC: 0.4 MG/DL — SIGNIFICANT CHANGE UP (ref 0.4–2)
BLD GP AB SCN SERPL QL: SIGNIFICANT CHANGE UP
BUN SERPL-MCNC: 14.5 MG/DL — SIGNIFICANT CHANGE UP (ref 8–20)
CALCIUM SERPL-MCNC: 9.6 MG/DL — SIGNIFICANT CHANGE UP (ref 8.4–10.5)
CHLORIDE SERPL-SCNC: 98 MMOL/L — SIGNIFICANT CHANGE UP (ref 96–108)
CO2 SERPL-SCNC: 30 MMOL/L — HIGH (ref 22–29)
CREAT SERPL-MCNC: 0.51 MG/DL — SIGNIFICANT CHANGE UP (ref 0.5–1.3)
EGFR: 94 ML/MIN/1.73M2 — SIGNIFICANT CHANGE UP
EOSINOPHIL # BLD AUTO: 0.16 K/UL — SIGNIFICANT CHANGE UP (ref 0–0.5)
EOSINOPHIL NFR BLD AUTO: 2.3 % — SIGNIFICANT CHANGE UP (ref 0–6)
ESTIMATED AVERAGE GLUCOSE: 103 MG/DL — SIGNIFICANT CHANGE UP (ref 68–114)
GLUCOSE SERPL-MCNC: 80 MG/DL — SIGNIFICANT CHANGE UP (ref 70–99)
HCT VFR BLD CALC: 45.5 % — HIGH (ref 34.5–45)
HGB BLD-MCNC: 15.4 G/DL — SIGNIFICANT CHANGE UP (ref 11.5–15.5)
IMM GRANULOCYTES NFR BLD AUTO: 0.7 % — SIGNIFICANT CHANGE UP (ref 0–0.9)
INR BLD: 0.93 RATIO — SIGNIFICANT CHANGE UP (ref 0.85–1.18)
LYMPHOCYTES # BLD AUTO: 2.08 K/UL — SIGNIFICANT CHANGE UP (ref 1–3.3)
LYMPHOCYTES # BLD AUTO: 29.8 % — SIGNIFICANT CHANGE UP (ref 13–44)
MCHC RBC-ENTMCNC: 33.8 GM/DL — SIGNIFICANT CHANGE UP (ref 32–36)
MCHC RBC-ENTMCNC: 34.5 PG — HIGH (ref 27–34)
MCV RBC AUTO: 102 FL — HIGH (ref 80–100)
MONOCYTES # BLD AUTO: 0.84 K/UL — SIGNIFICANT CHANGE UP (ref 0–0.9)
MONOCYTES NFR BLD AUTO: 12 % — SIGNIFICANT CHANGE UP (ref 2–14)
MRSA PCR RESULT.: SIGNIFICANT CHANGE UP
NEUTROPHILS # BLD AUTO: 3.79 K/UL — SIGNIFICANT CHANGE UP (ref 1.8–7.4)
NEUTROPHILS NFR BLD AUTO: 54.2 % — SIGNIFICANT CHANGE UP (ref 43–77)
PLATELET # BLD AUTO: 428 K/UL — HIGH (ref 150–400)
POTASSIUM SERPL-MCNC: 4.6 MMOL/L — SIGNIFICANT CHANGE UP (ref 3.5–5.3)
POTASSIUM SERPL-SCNC: 4.6 MMOL/L — SIGNIFICANT CHANGE UP (ref 3.5–5.3)
PROT SERPL-MCNC: 7 G/DL — SIGNIFICANT CHANGE UP (ref 6.6–8.7)
PROTHROM AB SERPL-ACNC: 10.3 SEC — SIGNIFICANT CHANGE UP (ref 9.5–13)
RBC # BLD: 4.46 M/UL — SIGNIFICANT CHANGE UP (ref 3.8–5.2)
RBC # FLD: 13.7 % — SIGNIFICANT CHANGE UP (ref 10.3–14.5)
S AUREUS DNA NOSE QL NAA+PROBE: SIGNIFICANT CHANGE UP
SODIUM SERPL-SCNC: 137 MMOL/L — SIGNIFICANT CHANGE UP (ref 135–145)
WBC # BLD: 6.99 K/UL — SIGNIFICANT CHANGE UP (ref 3.8–10.5)
WBC # FLD AUTO: 6.99 K/UL — SIGNIFICANT CHANGE UP (ref 3.8–10.5)

## 2024-05-15 PROCEDURE — 86850 RBC ANTIBODY SCREEN: CPT

## 2024-05-15 PROCEDURE — 71046 X-RAY EXAM CHEST 2 VIEWS: CPT | Mod: 26

## 2024-05-15 PROCEDURE — G0480: CPT

## 2024-05-15 PROCEDURE — 85025 COMPLETE CBC W/AUTO DIFF WBC: CPT

## 2024-05-15 PROCEDURE — 87640 STAPH A DNA AMP PROBE: CPT

## 2024-05-15 PROCEDURE — 80053 COMPREHEN METABOLIC PANEL: CPT

## 2024-05-15 PROCEDURE — 83036 HEMOGLOBIN GLYCOSYLATED A1C: CPT

## 2024-05-15 PROCEDURE — 71046 X-RAY EXAM CHEST 2 VIEWS: CPT

## 2024-05-15 PROCEDURE — 85610 PROTHROMBIN TIME: CPT

## 2024-05-15 PROCEDURE — 93010 ELECTROCARDIOGRAM REPORT: CPT

## 2024-05-15 PROCEDURE — 86901 BLOOD TYPING SEROLOGIC RH(D): CPT

## 2024-05-15 PROCEDURE — 87641 MR-STAPH DNA AMP PROBE: CPT

## 2024-05-15 PROCEDURE — 36415 COLL VENOUS BLD VENIPUNCTURE: CPT

## 2024-05-15 PROCEDURE — 93005 ELECTROCARDIOGRAM TRACING: CPT

## 2024-05-15 PROCEDURE — 85730 THROMBOPLASTIN TIME PARTIAL: CPT

## 2024-05-15 PROCEDURE — 86900 BLOOD TYPING SEROLOGIC ABO: CPT

## 2024-05-15 RX ORDER — TRAZODONE HCL 50 MG
1 TABLET ORAL
Qty: 0 | Refills: 0 | DISCHARGE

## 2024-05-15 RX ORDER — FLUOXETINE HCL 10 MG
1 CAPSULE ORAL
Qty: 0 | Refills: 0 | DISCHARGE

## 2024-05-15 RX ORDER — HYDROXYUREA 500 MG/1
1 CAPSULE ORAL
Qty: 0 | Refills: 0 | DISCHARGE

## 2024-05-15 RX ORDER — OXYBUTYNIN CHLORIDE 5 MG
1 TABLET ORAL
Refills: 0 | DISCHARGE

## 2024-05-15 RX ORDER — DICLOFENAC SODIUM 75 MG/1
1 TABLET, DELAYED RELEASE ORAL
Refills: 0 | DISCHARGE

## 2024-05-15 RX ORDER — DIVALPROEX SODIUM 500 MG/1
2 TABLET, DELAYED RELEASE ORAL
Qty: 0 | Refills: 0 | DISCHARGE

## 2024-05-15 RX ORDER — AMLODIPINE BESYLATE 2.5 MG/1
1 TABLET ORAL
Qty: 0 | Refills: 0 | DISCHARGE

## 2024-05-15 RX ORDER — OLANZAPINE 15 MG/1
1 TABLET, FILM COATED ORAL
Qty: 0 | Refills: 0 | DISCHARGE

## 2024-05-15 RX ORDER — PANTOPRAZOLE SODIUM 20 MG/1
1 TABLET, DELAYED RELEASE ORAL
Qty: 0 | Refills: 0 | DISCHARGE

## 2024-05-15 NOTE — H&P PST ADULT - PROBLEM SELECTOR PLAN 7
PCP for medical management and follow up as indicated. Medical optimization pending. Surgical team to follow. Stop bang = 3.

## 2024-05-15 NOTE — H&P PST ADULT - FUNCTIONAL STATUS
service was utilized as patient's father was present to review recommended services  He reported patient completed the audiology testing this morning and they have a meeting with [facility] next month  We reviewed the recommended MEGAN services  Father was provided with a detailed description of what these services are and why they were recommended  He was also provided with an article in both [place] and [place] for further detail  Father was given a list of agencies local to him and instructions on how to establish with a provider  He explained he would be willing to take patient anywhere, no matter the distance, to get these services  We discussed that we need to start within his county for insurance purposes  He was informed they may ask for additional information such as a letter of medical necessity or patient's clinical notes  Father asked to have these documents preemptively in hopes of speeding up the process  His e-mail address was confirmed and he was informed he can expect these documents by mid-late next week  Father expressed significant appreciation and was in agreement with the instruction to contact our office if he has any additional questions or concerns  Father was present for approximately 30 minutes 
Denies CP or SOB with mild to moderate physical exertion./4-10 METS

## 2024-05-15 NOTE — H&P PST ADULT - EKG AND INTERPRETATION
EKG NSR 67 bpm pending final cardiac interpretation and medical optimization, EKG faxed to PCP, pt. aware to f/u with PCP re. EKG results

## 2024-05-15 NOTE — H&P PST ADULT - PRO TOBACCO TYPE
stopped one month ago/cigarettes stopped one month ago, but cannot recall date of last cigarette./cigarettes

## 2024-05-15 NOTE — H&P PST ADULT - GASTROINTESTINAL
negative normal/soft/nontender/nondistended/normal active bowel sounds/no guarding/no rigidity/no organomegaly/no palpable mikayla/no masses palpable

## 2024-05-15 NOTE — H&P PST ADULT - HISTORY OF PRESENT ILLNESS
Pt. states she has had back pain for the past 2-3 years that has been worsening in the last few months, pain in lower back is relieved with pain medicine, but she cannot walk for long periods of time > 10 minutes, cannot climb stairs due to pain, pain is intermittent , rated 10/10 at its worst, described as a stabbing, radiates down left leg, denies numbness/tingling, uses a cane, denies recent falls. History of right breast ca s/p lumpectomy, history of bipolar follows with psychiatry. States this procedure was previously postponed per her decision. History of polycythemia vera- follows with heme, does not know name.  80 y/o female presents today to PST pending L2 L3 L4 L5 laminectomy on 5/20/24 secondary to spinal stenosis lumbosacral region with Dr. Emilio Howard. Pt. states she has had back pain for the past 2-3 years that has been worsening in the last few months, pain in lower back is relieved with pain medicine, but she cannot walk for long periods of time > 10 minutes, cannot climb stairs due to pain, pain is intermittent , rated 10/10 at its worst, described as a stabbing, radiates down left leg, denies numbness/tingling, uses a cane, denies recent falls. History of right breast ca s/p lumpectomy, history of bipolar follows with psychiatry. States this procedure was previously postponed per her decision. History of polycythemia vera- follows with heme, does not know name. Pt. states she was previously on hydroxyurea for this but was stopped for the surgery, along with some of her vitamins. Pt. with a history of HTN and HLD. History of COPD per chart, pt. denies.

## 2024-05-15 NOTE — H&P PST ADULT - [facility] PST DIAGOTHER LIST_GEN_A_CORE
5/15/24 14:32 All available labs and diagnostics noted as documented. All abnormal labs and diagnostics noted as documented and have been faxed to PCP with whom medical optimization is pending. Kitty Hinojosa emailed re. CBC and repeat CBC ordered for DOS. T&S, MRSA/MSSA, urine nicotine and CXR pending. Manjeet MS, FNP-BC

## 2024-05-15 NOTE — H&P PST ADULT - ASSESSMENT
Pt. states she has had back pain for the past 2-3 years that has been worsening in the last few months, pain in lower back is relieved with pain medicine, but she cannot walk for long periods of time > 10 minutes, cannot climb stairs due to pain, pain is intermittent , rated 10/10 at its worst, described as a stabbing, radiates down left leg, denies numbness/tingling, uses a cane, denies recent falls. History of right breast ca s/p lumpectomy, history of bipolar follows with psychiatry. States this procedure was previously postponed per her decision. History of polycythemia vera- follows with heme, does not know name.  BMI 25.4.    Pt. educated and instructed regarding all preoperative instructions and education as per policy via both verbal and written means of communication and pt. verbalized agreement and understanding.  Pt. to have medical clearance with PCP, pt. states she already seen, and pt. verbalized agreement and understanding.  Pt. to have heme. clearance with heme. and pt. verbalized agreement and understanding.   Pt instructed to stop vitamins/supplements/herbal medications/ASA/NSAIDS for one week prior to surgery and pt. verbalized agreement and understanding.   Spine booklet provided, ERP protocol reviewed, MSSA/MRSA swab done in pst, results pending and pt. verbalized agreement and understanding.  Patient educated on surgical scrub, preadmission instructions, medical clearance and day of procedure medications, pt. verbalizes understanding and agreement.    OPIOID RISK TOOL    ZHANNA EACH BOX THAT APPLIES AND ADD TOTALS AT THE END    FAMILY HISTORY OF SUBSTANCE ABUSE                 FEMALE         MALE                                                Alcohol                             [  ]1 pt          [  ]3pts                                               Illegal Durgs                     [  ]2 pts        [  ]3pts                                               Rx Drugs                           [  ]4 pts        [  ]4 pts    PERSONAL HISTORY OF SUBSTANCE ABUSE                                                                                          Alcohol                             [  ]3 pts       [  ]3 pts                                               Illegal Drugs                     [  ]4 pts        [  ]4 pts                                               Rx Drugs                           [  ]5 pts        [  ]5 pts    AGE BETWEEN 16-45 YEARS                                      [  ]1 pt         [  ]1 pt    HISTORY OF PREADOLESCENT   SEXUAL ABUSE                                                             [  ]3 pts        [  ]0pts    PSYCHOLOGICAL DISEASE                     ADD, OCD, Bipolar, Schizophrenia        [  ]2 pts         [  ]2 pts                      Depression                                               [  ]1 pt           [  ]1 pt           SCORING TOTAL   (add numbers and type here)              (***)                                     A score of 3 or lower indicated LOW risk for future opioid abuse  A score of 4 to 7 indicated moderate risk for future opioid abuse  A score of 8 or higher indicates a high risk for opioid abuse    CAPRINI SCORE    AGE RELATED RISK FACTORS                                                             [ ] Age 41-60 years                                            (1 Point)  [ ] Age: 61-74 years                                           (2 Points)                 [ ] Age= 75 years                                                (3 Points)             DISEASE RELATED RISK FACTORS                                                       [ ] Edema in the lower extremities                 (1 Point)                     [ ] Varicose veins                                               (1 Point)                                 [ ] BMI > 25 Kg/m2                                            (1 Point)                                  [ ] Serious infection (ie PNA)                            (1 Point)                     [ ] Lung disease ( COPD, Emphysema)            (1 Point)                                                                          [ ] Acute myocardial infarction                         (1 Point)                  [ ] Congestive heart failure (in the previous month)  (1 Point)         [ ] Inflammatory bowel disease                            (1 Point)                  [ ] Central venous access, PICC or Port               (2 points)       (within the last month)                                                                [ ] Stroke (in the previous month)                        (5 Points)    [ ] Previous or present malignancy                       (2 points)                                                                                                                                                         HEMATOLOGY RELATED FACTORS                                                         [ ] Prior episodes of VTE                                     (3 Points)                     [ ] Positive family history for VTE                      (3 Points)                  [ ] Prothrombin 88734 A                                     (3 Points)                     [ ] Factor V Leiden                                                (3 Points)                        [ ] Lupus anticoagulants                                      (3 Points)                                                           [ ] Anticardiolipin antibodies                              (3 Points)                                                       [ ] High homocysteine in the blood                   (3 Points)                                             [ ] Other congenital or acquired thrombophilia      (3 Points)                                                [ ] Heparin induced thrombocytopenia                  (3 Points)                                        MOBILITY RELATED FACTORS  [ ] Bed rest                                                         (1 Point)  [ ] Plaster cast                                                    (2 points)  [ ] Bed bound for more than 72 hours           (2 Points)    GENDER SPECIFIC FACTORS  [ ] Pregnancy or had a baby within the last month   (1 Point)  [ ] Post-partum < 6 weeks                                   (1 Point)  [ ] Hormonal therapy  or oral contraception   (1 Point)  [ ] History of pregnancy complications              (1 point)  [ ] Unexplained or recurrent              (1 Point)    OTHER RISK FACTORS                                           (1 Point)  [ ] BMI >40, smoking, diabetes requiring insulin, chemotherapy  blood transfusions and length of surgery over 2 hours    SURGERY RELATED RISK FACTORS  [ ]  Section within the last month     (1 Point)  [ ] Minor surgery                                                  (1 Point)  [ ] Arthroscopic surgery                                       (2 Points)  [ x] Planned major surgery lasting more            (2 Points)      than 45 minutes     [ ] Elective hip or knee joint replacement       (5 points)       surgery                                                TRAUMA RELATED RISK FACTORS  [ ] Fracture of the hip, pelvis, or leg                       (5 Points)  [ ] Spinal cord injury resulting in paralysis             (5 points)       (in the previous month)    [ ] Paralysis  (less than 1 month)                             (5 Points)  [ ] Multiple Trauma within 1 month                        (5 Points)    Total Score [        ]    Caprini Score 0-2: Low Risk, NO VTE prophylaxis required for most patients, encourage ambulation  Caprini Score 3-6: Moderate Risk , pharmacologic VTE prophylaxis is indicated for most patients (in the absence of contraindications)  Caprini Score Greater than or =7: High risk, pharmocologic VTE prophylaxis indicated for most patients (in the absence of contraindications)                                 80 y/o female presents today to PST pending L2 L3 L4 L5 laminectomy on 24 secondary to spinal stenosis lumbosacral region with Dr. Emilio Howard. Pt. states she has had back pain for the past 2-3 years that has been worsening in the last few months, pain in lower back is relieved with pain medicine, but she cannot walk for long periods of time > 10 minutes, cannot climb stairs due to pain, pain is intermittent , rated 10/10 at its worst, described as a stabbing, radiates down left leg, denies numbness/tingling, uses a cane, denies recent falls. History of right breast ca s/p lumpectomy, history of bipolar follows with psychiatry. States this procedure was previously postponed per her decision. History of polycythemia vera- follows with heme, does not know name. Pt. states she was previously on hydroxyurea for this but was stopped for the surgery, along with some of her vitamins. Pt. with a history of HTN and HLD. History of COPD per chart, pt. denies.  BMI 25.4.    Pt. educated and instructed regarding all preoperative instructions and education as per policy via both verbal and written means of communication and pt. verbalized agreement and understanding.  Pt. to have medical clearance with PCP Dr. Sultana, office to be contacted, pending receipt of clearance, pt. states she already seen, and pt. verbalized agreement and understanding.  Pt instructed to stop vitamins/supplements/herbal medications/NSAIDS for one week prior to surgery and pt. verbalized agreement and understanding.   Spine booklet provided, ERP protocol reviewed, MSSA/MRSA swab done in pst, results pending and pt. verbalized agreement and understanding.  Patient educated on surgical scrub, preadmission instructions, medical clearance and day of procedure medications, pt. verbalizes understanding and agreement.    OPIOID RISK TOOL    ZHANNA EACH BOX THAT APPLIES AND ADD TOTALS AT THE END    FAMILY HISTORY OF SUBSTANCE ABUSE                 FEMALE         MALE                                                Alcohol                             [  ]1 pt          [  ]3pts                                               Illegal Durgs                     [  ]2 pts        [  ]3pts                                               Rx Drugs                           [  ]4 pts        [  ]4 pts    PERSONAL HISTORY OF SUBSTANCE ABUSE                                                                                          Alcohol                             [  ]3 pts       [  ]3 pts                                               Illegal Drugs                     [  ]4 pts        [  ]4 pts                                               Rx Drugs                           [  ]5 pts        [  ]5 pts    AGE BETWEEN 16-45 YEARS                                      [  ]1 pt         [  ]1 pt    HISTORY OF PREADOLESCENT   SEXUAL ABUSE                                                             [  ]3 pts        [  ]0pts    PSYCHOLOGICAL DISEASE                     ADD, OCD, Bipolar, Schizophrenia        [ x ]2 pts         [  ]2 pts                      Depression                                               [  ]1 pt           [  ]1 pt           SCORING TOTAL   (add numbers and type here)              (2)                                     A score of 3 or lower indicated LOW risk for future opioid abuse  A score of 4 to 7 indicated moderate risk for future opioid abuse  A score of 8 or higher indicates a high risk for opioid abuse    CAPRINI SCORE    AGE RELATED RISK FACTORS                                                             [ ] Age 41-60 years                                            (1 Point)  [ ] Age: 61-74 years                                           (2 Points)                 [x ] Age= 75 years                                                (3 Points)             DISEASE RELATED RISK FACTORS                                                       [ ] Edema in the lower extremities                 (1 Point)                     [ ] Varicose veins                                               (1 Point)                                 [x ] BMI > 25 Kg/m2                                            (1 Point)                                  [ ] Serious infection (ie PNA)                            (1 Point)                     [ ] Lung disease ( COPD, Emphysema)            (1 Point)                                                                          [ ] Acute myocardial infarction                         (1 Point)                  [ ] Congestive heart failure (in the previous month)  (1 Point)         [ ] Inflammatory bowel disease                            (1 Point)                  [ ] Central venous access, PICC or Port               (2 points)       (within the last month)                                                                [ ] Stroke (in the previous month)                        (5 Points)    [x ] Previous or present malignancy                       (2 points)                                                                                                                                                         HEMATOLOGY RELATED FACTORS                                                         [ ] Prior episodes of VTE                                     (3 Points)                     [ ] Positive family history for VTE                      (3 Points)                  [ ] Prothrombin 62904 A                                     (3 Points)                     [ ] Factor V Leiden                                                (3 Points)                        [ ] Lupus anticoagulants                                      (3 Points)                                                           [ ] Anticardiolipin antibodies                              (3 Points)                                                       [ ] High homocysteine in the blood                   (3 Points)                                             [ ] Other congenital or acquired thrombophilia      (3 Points)                                                [ ] Heparin induced thrombocytopenia                  (3 Points)                                        MOBILITY RELATED FACTORS  [ ] Bed rest                                                         (1 Point)  [ ] Plaster cast                                                    (2 points)  [ ] Bed bound for more than 72 hours           (2 Points)    GENDER SPECIFIC FACTORS  [ ] Pregnancy or had a baby within the last month   (1 Point)  [ ] Post-partum < 6 weeks                                   (1 Point)  [ ] Hormonal therapy  or oral contraception   (1 Point)  [ ] History of pregnancy complications              (1 point)  [ ] Unexplained or recurrent              (1 Point)    OTHER RISK FACTORS                                           (1 Point)  [x ] BMI >40, smoking, diabetes requiring insulin, chemotherapy  blood transfusions and length of surgery over 2 hours    SURGERY RELATED RISK FACTORS  [ ]  Section within the last month     (1 Point)  [ ] Minor surgery                                                  (1 Point)  [ ] Arthroscopic surgery                                       (2 Points)  [ x] Planned major surgery lasting more            (2 Points)      than 45 minutes     [ ] Elective hip or knee joint replacement       (5 points)       surgery                                                TRAUMA RELATED RISK FACTORS  [ ] Fracture of the hip, pelvis, or leg                       (5 Points)  [ ] Spinal cord injury resulting in paralysis             (5 points)       (in the previous month)    [ ] Paralysis  (less than 1 month)                             (5 Points)  [ ] Multiple Trauma within 1 month                        (5 Points)    Total Score [     9   ]    Caprini Score 0-2: Low Risk, NO VTE prophylaxis required for most patients, encourage ambulation  Caprini Score 3-6: Moderate Risk , pharmacologic VTE prophylaxis is indicated for most patients (in the absence of contraindications)  Caprini Score Greater than or =7: High risk, pharmocologic VTE prophylaxis indicated for most patients (in the absence of contraindications)

## 2024-05-15 NOTE — H&P PST ADULT - PATIENT OPTIMIZED
medical and heme. optimization, labs pending, CXR pending medical  optimization, labs pending, CXR pending

## 2024-05-15 NOTE — H&P PST ADULT - NSICDXFAMILYHX_GEN_ALL_CORE_FT
FAMILY HISTORY:  Mother  Still living? Unknown  FH: myocardial infarction, Age at diagnosis: Age Unknown  FH: type 2 diabetes, Age at diagnosis: Age Unknown    Child  Still living? Unknown  Family history of breast cancer, Age at diagnosis: Age Unknown

## 2024-05-15 NOTE — H&P PST ADULT - NEGATIVE PSYCHIATRIC SYMPTOMS
no suicidal ideation/no anxiety/no memory loss/no paranoia/no mood swings/no agitation/no visual hallucinations/no auditory hallucinations/no hyperactivity

## 2024-05-15 NOTE — H&P PST ADULT - NSICDXPASTMEDICALHX_GEN_ALL_CORE_FT
PAST MEDICAL HISTORY:  Bipolar 1 disorder     Breast cancer     Chronic obstructive pulmonary disease (COPD)     COVID-19 vaccine series completed     Depression     Lumbar stenosis     Polycythemia vera      PAST MEDICAL HISTORY:  Bipolar 1 disorder     Breast cancer     Chronic obstructive pulmonary disease (COPD)     COVID-19 vaccine series completed     Depression     Lumbar stenosis     Polycythemia vera     Spinal stenosis of lumbosacral region     Tobacco use disorder

## 2024-05-15 NOTE — H&P PST ADULT - RESPIRATORY
Tacrolimus level 9.5 at 12 hours, on 8/2/23.  Goal 8-10.     No change in dose, level at goal   [employer] message sent      normal/clear to auscultation bilaterally/no wheezes/no rales/no rhonchi/no respiratory distress/no use of accessory muscles/airway patent/breath sounds equal/good air movement/respirations non-labored/no intercostal retractions/no dull ness or hyperresonance to percussion/use of accessory muscles normal/clear to auscultation bilaterally/no rales/no rhonchi/no respiratory distress/no use of accessory muscles/airway patent/breath sounds equal/good air movement/respirations non-labored/no intercostal retractions/no dull ness or hyperresonance to percussion/use of accessory muscles/wheezes

## 2024-05-15 NOTE — H&P PST ADULT - PROBLEM SELECTOR PLAN 2
ORT = 2, surgical team to follow. PCP for medical management and follow up as indicated. Medical optimization pending.

## 2024-05-15 NOTE — H&P PST ADULT - NSANTHBPHIGHRD_ENT_A_CORE
Breast pump desired  
My chart sent to patient to check with insurance to find out what medical supply company her insurance uses and to let us know.  Will follow with my chart message.  
Yes

## 2024-05-15 NOTE — H&P PST ADULT - PROBLEM SELECTOR PLAN 6
Medical clearance pending for L2 L3 L4 L5 laminectomy on 5/20/24 secondary to spinal stenosis lumbosacral region with Dr. Emilio Howard.

## 2024-05-15 NOTE — H&P PST ADULT - NSICDXPASTSURGICALHX_GEN_ALL_CORE_FT
PAST SURGICAL HISTORY:  Cataract     S/P breast lumpectomy     S/P hysterectomy      PAST SURGICAL HISTORY:  Cataract     S/P breast lumpectomy     S/P hip replacement, left     S/P hip replacement, right     S/P hysterectomy

## 2024-05-19 LAB
ANABASINE UR-MCNC: <2 NG/ML — SIGNIFICANT CHANGE UP
COTININE UR-MCNC: 660 NG/ML — HIGH
NICOTINE UR-MCNC: 13 NG/ML — HIGH
NORNICOTINE UR-MCNC: 5.5 NG/ML — HIGH

## 2024-05-20 ENCOUNTER — APPOINTMENT (OUTPATIENT)
Dept: ORTHOPEDIC SURGERY | Facility: HOSPITAL | Age: 79
End: 2024-05-20

## 2024-05-21 ENCOUNTER — APPOINTMENT (OUTPATIENT)
Dept: ORTHOPEDIC SURGERY | Facility: CLINIC | Age: 79
End: 2024-05-21

## 2024-05-28 PROBLEM — F17.200 NICOTINE DEPENDENCE, UNSPECIFIED, UNCOMPLICATED: Chronic | Status: ACTIVE | Noted: 2024-05-15

## 2024-05-28 PROBLEM — Z92.29 PERSONAL HISTORY OF OTHER DRUG THERAPY: Chronic | Status: ACTIVE | Noted: 2024-05-15

## 2024-05-28 PROBLEM — M48.07 SPINAL STENOSIS, LUMBOSACRAL REGION: Chronic | Status: ACTIVE | Noted: 2024-05-15

## 2024-05-30 ENCOUNTER — APPOINTMENT (OUTPATIENT)
Dept: ORTHOPEDIC SURGERY | Facility: CLINIC | Age: 79
End: 2024-05-30
Payer: MEDICARE

## 2024-05-30 VITALS
DIASTOLIC BLOOD PRESSURE: 89 MMHG | WEIGHT: 135 LBS | BODY MASS INDEX: 23.05 KG/M2 | HEIGHT: 64 IN | SYSTOLIC BLOOD PRESSURE: 164 MMHG | HEART RATE: 78 BPM

## 2024-05-30 DIAGNOSIS — M48.07 SPINAL STENOSIS, LUMBOSACRAL REGION: ICD-10-CM

## 2024-05-30 DIAGNOSIS — M48.00 SPINAL STENOSIS, SITE UNSPECIFIED: ICD-10-CM

## 2024-05-30 DIAGNOSIS — J44.9 CHRONIC OBSTRUCTIVE PULMONARY DISEASE, UNSPECIFIED: ICD-10-CM

## 2024-05-30 DIAGNOSIS — M60.9 MYOSITIS, UNSPECIFIED: ICD-10-CM

## 2024-05-30 DIAGNOSIS — M47.812 SPONDYLOSIS W/OUT MYELOPATHY OR RADICULOPATHY, CERVICAL REGION: ICD-10-CM

## 2024-05-30 DIAGNOSIS — F17.200 NICOTINE DEPENDENCE, UNSPECIFIED, UNCOMPLICATED: ICD-10-CM

## 2024-05-30 PROCEDURE — 99215 OFFICE O/P EST HI 40 MIN: CPT

## 2024-05-30 NOTE — PHYSICAL EXAM
[Antalgic] : antalgic [de-identified] : CONSTITUTIONAL: The patient is a very pleasant individual who is well-nourished and who appears stated age. PSYCHIATRIC: The patient is alert and oriented X 3 and in no apparent distress, and participates with orthopedic evaluation well. HEAD: Atraumatic and is nonsyndromic in appearance. EENT: No visible thyromegaly, EOMI. RESPIRATORY: Respiratory rate is regular, not dyspneic on examination. LYMPHATICS: There is no inguinal lymphadenopathy INTEGUMENTARY: Skin is clean, dry, and intact about the bilateral lower extremities and lumbar spine. VASCULAR: There is brisk capillary refill about the bilateral lower extremities. NEUROLOGIC: There are no pathologic reflexes. There is no decrease in sensation of the bilateral lower extremities on manual  examination. Deep tendon reflexes are well maintained at 2+/4 of the bilateral lower extremities and are symmetric.. MUSCULOSKELETAL: There is no visible muscular atrophy. Manual motor strength is well maintained in the bilateral lower extremities. Range of motion of lumbar spine is well maintained. The patient ambulates in a non-myelopathic manner. Negative tension sign and straight leg raise bilaterally. Quad extension, ankle dorsiflexion, EHL, plantar flexion, and ankle eversion are well preserved. Normal secondary orthopaedic exam of bilateral hips, greater trochanteric area, knees and ankles, signs and symptoms of severe neurogenic claudication [de-identified] : X-rays of the lumbar spine reviewed from March 2024 shows diffuse lumbar spondylosis with degenerative disc disease [id number].  MRI of the lumbar spine from [facility] demonstrates moderate to severe spinal stenosis at [id number] and to a lesser degree 5 1 this is from January 2024 again [facility].

## 2024-05-30 NOTE — DISCUSSION/SUMMARY
[de-identified] : Patient states she does not want to undergo lumbar laminectomy surgery because her  states if she goes forward with surgery she will not get money from a no-fault case.  Patient elects not to have spinal decompressive surgery she will be placed in physical therapy primary care doctor can manage anti-inflammatories because she has a history of GERD I think this needs medical management pain management physical therapy when patient wishes to discuss lumbar decompressive surgery she can follow-up at that point in time.  Again I have discussed gradual onset of worsening neurogenic claudication I think to risk the dramatic compromise of cauda equina syndrome urinary retention progressive debility because of her no-fault case I think is ludicrous and at the patient's own discretion I recommend lumbar decompressive surgery secondary to severe spinal stenosis I think the high risk of neurologic compromise urinary compromise etc.  A long discussion was had with the patient regarding Lumbar surgical plan of lumbar laminectomy L2-L5. Anatomic models, Xrays, CT scans/MRI's were utilized to provide a firm understanding of their surgical plan. Patient is aware that surgery is elective in nature and he/she choosing to proceed with surgery. Risks, benefits, alternatives were discussed and all questions, comments and concerns were encouraged and answered to the patient's satisfaction. The statistical probability of improvement was discussed at length as well as post surgical course. Literature from [employer] spine society was provided to the patient regarding the specific type of surgery as well as a 5 page written surgical consent which the patient will need to sign and return to the office prior to surgical date. Consent forms highlight specific complications related to the complex nature of spinal surgery ã?? Risks of lumbar surgery include: persistent pain, adjacent segment disease (which will require more surgery in future), dural tears, neurologic injury, and wound healing complications ã?? Benefits of lumbar surgery include Improved neurologic and pain score ã?? We also discussed with the patient complications of incisions directly related to obesity, diabetes, previous wound complications or post-surgical wound infections, smoking, neuropathy, and chronic anticoagulation. This risk has been specifically discussed and the patient will discuss modifiable risk factors to be optimized prior to surgical management. A multimodality approach of primary care physician, and medicine subspecialist will be utilized to optimize medical risk factors. ã?? If patient is a smoker, discontinuation of smoking was advised and must be accomplished 6-8 weeks prior to surgery date. Patient was advised that help with quitting smoking is available through [place] [facility] and phone number/website was provided, or patient can ask assistance from primary care provider. Elective surgery will not be performed unless patient complies with this policy.

## 2024-05-30 NOTE — HISTORY OF PRESENT ILLNESS
[de-identified] : Lorena is a very pleasant 79-year-old female who presents for lumbar spinal stenosis follow-up she was scheduled for lumbar laminectomy she electively canceled the case after presurgical testing excetra and the 2 days before case/surgery this was canceled because she states that  said she would not get her money if she underwent spinal surgery she presents today for further guidance.  Overall appears her neurogenic claudication is worsening she states she is walking hunched over and for a few minutes of stance and gradually worsens.  Patient appears to still be smoking secondary to tobacco smell [Worsening] : worsening [___ yrs] : [unfilled] year(s) ago [10] : a maximum pain level of 10/10 [Constant] : ~He/She~ states the symptoms seem to be constant [Prolonged Standing] : worsened by prolonged standing [Standing] : worsened by standing [Walking] : worsened by walking [Weight Bearing] : worsened by weight bearing [Rest] : relieved by rest [Ataxia] : no ataxia [Incontinence] : no incontinence [Loss of Dexterity] : good dexterity [Urinary Ret.] : no urinary retention

## 2024-05-31 ENCOUNTER — APPOINTMENT (OUTPATIENT)
Dept: ORTHOPEDIC SURGERY | Facility: CLINIC | Age: 79
End: 2024-05-31

## 2024-06-18 ENCOUNTER — OUTPATIENT (OUTPATIENT)
Dept: OUTPATIENT SERVICES | Facility: HOSPITAL | Age: 79
LOS: 1 days | Discharge: HOME | End: 2024-06-18

## 2024-06-18 DIAGNOSIS — C50.911 MALIGNANT NEOPLASM OF UNSPECIFIED SITE OF RIGHT FEMALE BREAST: ICD-10-CM

## 2024-06-18 DIAGNOSIS — H26.9 UNSPECIFIED CATARACT: Chronic | ICD-10-CM

## 2024-06-18 DIAGNOSIS — Z96.642 PRESENCE OF LEFT ARTIFICIAL HIP JOINT: Chronic | ICD-10-CM

## 2024-06-18 DIAGNOSIS — Z98.890 OTHER SPECIFIED POSTPROCEDURAL STATES: Chronic | ICD-10-CM

## 2024-06-18 DIAGNOSIS — Z96.641 PRESENCE OF RIGHT ARTIFICIAL HIP JOINT: Chronic | ICD-10-CM

## 2024-06-18 DIAGNOSIS — D45 POLYCYTHEMIA VERA: ICD-10-CM

## 2024-06-18 DIAGNOSIS — Z90.710 ACQUIRED ABSENCE OF BOTH CERVIX AND UTERUS: Chronic | ICD-10-CM

## 2024-06-19 ENCOUNTER — APPOINTMENT (OUTPATIENT)
Dept: HEMATOLOGY ONCOLOGY | Facility: CLINIC | Age: 79
End: 2024-06-19
Payer: MEDICARE

## 2024-06-19 ENCOUNTER — RESULT REVIEW (OUTPATIENT)
Age: 79
End: 2024-06-19

## 2024-06-19 ENCOUNTER — APPOINTMENT (OUTPATIENT)
Dept: INFUSION THERAPY | Facility: CLINIC | Age: 79
End: 2024-06-19

## 2024-06-19 VITALS
HEIGHT: 64 IN | HEART RATE: 76 BPM | TEMPERATURE: 98.1 F | BODY MASS INDEX: 24.24 KG/M2 | SYSTOLIC BLOOD PRESSURE: 136 MMHG | WEIGHT: 142 LBS | OXYGEN SATURATION: 96 % | DIASTOLIC BLOOD PRESSURE: 68 MMHG

## 2024-06-19 DIAGNOSIS — Z15.89 GENETIC SUSCEPTIBILITY TO OTHER DISEASE: ICD-10-CM

## 2024-06-19 DIAGNOSIS — Z51.81 ENCOUNTER FOR THERAPEUTIC DRUG LVL MONITORING: ICD-10-CM

## 2024-06-19 DIAGNOSIS — Z79.64 ENCOUNTER FOR THERAPEUTIC DRUG LVL MONITORING: ICD-10-CM

## 2024-06-19 DIAGNOSIS — D45 POLYCYTHEMIA VERA: ICD-10-CM

## 2024-06-19 LAB
BASOPHILS # BLD AUTO: 0.07 K/UL — SIGNIFICANT CHANGE UP (ref 0–0.2)
BASOPHILS NFR BLD AUTO: 1.5 % — SIGNIFICANT CHANGE UP (ref 0–2)
EOSINOPHIL # BLD AUTO: 0.16 K/UL — SIGNIFICANT CHANGE UP (ref 0–0.5)
EOSINOPHIL NFR BLD AUTO: 3.3 % — SIGNIFICANT CHANGE UP (ref 0–6)
HCT VFR BLD CALC: 46.9 % — HIGH (ref 34.5–45)
HGB BLD-MCNC: 16.3 G/DL — HIGH (ref 11.5–15.5)
IMM GRANULOCYTES NFR BLD AUTO: 0.2 % — SIGNIFICANT CHANGE UP (ref 0–0.9)
LYMPHOCYTES # BLD AUTO: 1.34 K/UL — SIGNIFICANT CHANGE UP (ref 1–3.3)
LYMPHOCYTES # BLD AUTO: 27.9 % — SIGNIFICANT CHANGE UP (ref 13–44)
MCHC RBC-ENTMCNC: 34.6 PG — HIGH (ref 27–34)
MCHC RBC-ENTMCNC: 34.8 GM/DL — SIGNIFICANT CHANGE UP (ref 32–36)
MCV RBC AUTO: 99.6 FL — SIGNIFICANT CHANGE UP (ref 80–100)
MONOCYTES # BLD AUTO: 0.42 K/UL — SIGNIFICANT CHANGE UP (ref 0–0.9)
MONOCYTES NFR BLD AUTO: 8.8 % — SIGNIFICANT CHANGE UP (ref 2–14)
NEUTROPHILS # BLD AUTO: 2.8 K/UL — SIGNIFICANT CHANGE UP (ref 1.8–7.4)
NEUTROPHILS NFR BLD AUTO: 58.3 % — SIGNIFICANT CHANGE UP (ref 43–77)
NRBC # BLD: 0 /100 WBCS — SIGNIFICANT CHANGE UP (ref 0–0)
PLATELET # BLD AUTO: 438 K/UL — HIGH (ref 150–400)
RBC # BLD: 4.71 M/UL — SIGNIFICANT CHANGE UP (ref 3.8–5.2)
RBC # FLD: 13.2 % — SIGNIFICANT CHANGE UP (ref 10.3–14.5)
WBC # BLD: 4.8 K/UL — SIGNIFICANT CHANGE UP (ref 3.8–10.5)
WBC # FLD AUTO: 4.8 K/UL — SIGNIFICANT CHANGE UP (ref 3.8–10.5)

## 2024-06-19 PROCEDURE — 99214 OFFICE O/P EST MOD 30 MIN: CPT

## 2024-06-19 PROCEDURE — G2211 COMPLEX E/M VISIT ADD ON: CPT

## 2024-06-22 ENCOUNTER — EMERGENCY (EMERGENCY)
Facility: HOSPITAL | Age: 79
LOS: 0 days | Discharge: HOME | End: 2024-06-22
Attending: EMERGENCY MEDICINE
Payer: COMMERCIAL

## 2024-06-22 VITALS
RESPIRATION RATE: 16 BRPM | HEIGHT: 63 IN | OXYGEN SATURATION: 96 % | DIASTOLIC BLOOD PRESSURE: 77 MMHG | SYSTOLIC BLOOD PRESSURE: 146 MMHG | TEMPERATURE: 98 F | HEART RATE: 77 BPM

## 2024-06-22 VITALS
HEART RATE: 65 BPM | RESPIRATION RATE: 17 BRPM | OXYGEN SATURATION: 98 % | SYSTOLIC BLOOD PRESSURE: 159 MMHG | DIASTOLIC BLOOD PRESSURE: 61 MMHG | TEMPERATURE: 98 F

## 2024-06-22 DIAGNOSIS — S80.02XA CONTUSION OF LEFT KNEE, INITIAL ENCOUNTER: ICD-10-CM

## 2024-06-22 DIAGNOSIS — J44.9 CHRONIC OBSTRUCTIVE PULMONARY DISEASE, UNSPECIFIED: ICD-10-CM
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
## 2024-06-22 DIAGNOSIS — I10 ESSENTIAL (PRIMARY) HYPERTENSION: ICD-10-CM

## 2024-06-22 DIAGNOSIS — Z97.4 PRESENCE OF EXTERNAL HEARING-AID: ICD-10-CM

## 2024-06-22 DIAGNOSIS — S80.01XA CONTUSION OF RIGHT KNEE, INITIAL ENCOUNTER: ICD-10-CM

## 2024-06-22 DIAGNOSIS — F31.9 BIPOLAR DISORDER, UNSPECIFIED: ICD-10-CM

## 2024-06-22 DIAGNOSIS — Z96.641 PRESENCE OF RIGHT ARTIFICIAL HIP JOINT: Chronic | ICD-10-CM
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
## 2024-06-22 DIAGNOSIS — R07.89 OTHER CHEST PAIN: ICD-10-CM

## 2024-06-22 DIAGNOSIS — Z98.890 OTHER SPECIFIED POSTPROCEDURAL STATES: Chronic | ICD-10-CM

## 2024-06-22 DIAGNOSIS — H26.9 UNSPECIFIED CATARACT: Chronic | ICD-10-CM

## 2024-06-22 DIAGNOSIS — S20.212A CONTUSION OF LEFT FRONT WALL OF THORAX, INITIAL ENCOUNTER: ICD-10-CM

## 2024-06-22 DIAGNOSIS — Z96.642 PRESENCE OF LEFT ARTIFICIAL HIP JOINT: Chronic | ICD-10-CM
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
## 2024-06-22 DIAGNOSIS — Z90.710 ACQUIRED ABSENCE OF BOTH CERVIX AND UTERUS: Chronic | ICD-10-CM

## 2024-06-22 DIAGNOSIS — Z88.8 ALLERGY STATUS TO OTHER DRUGS, MEDICAMENTS AND BIOLOGICAL SUBSTANCES STATUS: ICD-10-CM

## 2024-06-22 LAB
ALBUMIN SERPL ELPH-MCNC: 3.7 G/DL — SIGNIFICANT CHANGE UP (ref 3.3–5)
ALP SERPL-CCNC: 69 U/L — SIGNIFICANT CHANGE UP (ref 40–120)
ALT FLD-CCNC: 13 U/L — SIGNIFICANT CHANGE UP (ref 12–78)
ANION GAP SERPL CALC-SCNC: 5 MMOL/L — SIGNIFICANT CHANGE UP (ref 5–17)
APPEARANCE UR: CLEAR — SIGNIFICANT CHANGE UP
APTT BLD: 30.9 SEC — SIGNIFICANT CHANGE UP (ref 24.5–35.6)
AST SERPL-CCNC: 18 U/L — SIGNIFICANT CHANGE UP (ref 15–37)
BACTERIA # UR AUTO: NEGATIVE /HPF — SIGNIFICANT CHANGE UP
BASOPHILS # BLD AUTO: 0.07 K/UL — SIGNIFICANT CHANGE UP (ref 0–0.2)
BASOPHILS NFR BLD AUTO: 1 % — SIGNIFICANT CHANGE UP (ref 0–2)
BILIRUB SERPL-MCNC: 0.4 MG/DL — SIGNIFICANT CHANGE UP (ref 0.2–1.2)
BILIRUB UR-MCNC: NEGATIVE — SIGNIFICANT CHANGE UP
BLD GP AB SCN SERPL QL: SIGNIFICANT CHANGE UP
BUN SERPL-MCNC: 11 MG/DL — SIGNIFICANT CHANGE UP (ref 7–23)
CALCIUM SERPL-MCNC: 9.3 MG/DL — SIGNIFICANT CHANGE UP (ref 8.5–10.1)
CAST: 0 /LPF — SIGNIFICANT CHANGE UP (ref 0–4)
CHLORIDE SERPL-SCNC: 99 MMOL/L — SIGNIFICANT CHANGE UP (ref 96–108)
CK SERPL-CCNC: 90 U/L — SIGNIFICANT CHANGE UP (ref 26–192)
CO2 SERPL-SCNC: 26 MMOL/L — SIGNIFICANT CHANGE UP (ref 22–31)
COLOR SPEC: YELLOW — SIGNIFICANT CHANGE UP
CREAT SERPL-MCNC: 0.61 MG/DL — SIGNIFICANT CHANGE UP (ref 0.5–1.3)
DIFF PNL FLD: NEGATIVE — SIGNIFICANT CHANGE UP
EGFR: 90 ML/MIN/1.73M2 — SIGNIFICANT CHANGE UP
EOSINOPHIL # BLD AUTO: 0.15 K/UL — SIGNIFICANT CHANGE UP (ref 0–0.5)
EOSINOPHIL NFR BLD AUTO: 2 % — SIGNIFICANT CHANGE UP (ref 0–6)
GLUCOSE SERPL-MCNC: 87 MG/DL — SIGNIFICANT CHANGE UP (ref 70–99)
GLUCOSE UR QL: NEGATIVE MG/DL — SIGNIFICANT CHANGE UP
HCT VFR BLD CALC: 39.9 % — SIGNIFICANT CHANGE UP (ref 34.5–45)
HGB BLD-MCNC: 13.9 G/DL — SIGNIFICANT CHANGE UP (ref 11.5–15.5)
IMM GRANULOCYTES NFR BLD AUTO: 0.3 % — SIGNIFICANT CHANGE UP (ref 0–0.9)
INR BLD: 0.89 RATIO — SIGNIFICANT CHANGE UP (ref 0.85–1.18)
KETONES UR-MCNC: NEGATIVE MG/DL — SIGNIFICANT CHANGE UP
LEUKOCYTE ESTERASE UR-ACNC: ABNORMAL
LYMPHOCYTES # BLD AUTO: 2.34 K/UL — SIGNIFICANT CHANGE UP (ref 1–3.3)
LYMPHOCYTES # BLD AUTO: 31.8 % — SIGNIFICANT CHANGE UP (ref 13–44)
MCHC RBC-ENTMCNC: 34.8 GM/DL — SIGNIFICANT CHANGE UP (ref 32–36)
MCHC RBC-ENTMCNC: 34.8 PG — HIGH (ref 27–34)
MCV RBC AUTO: 99.8 FL — SIGNIFICANT CHANGE UP (ref 80–100)
MONOCYTES # BLD AUTO: 0.74 K/UL — SIGNIFICANT CHANGE UP (ref 0–0.9)
MONOCYTES NFR BLD AUTO: 10.1 % — SIGNIFICANT CHANGE UP (ref 2–14)
NEUTROPHILS # BLD AUTO: 4.04 K/UL — SIGNIFICANT CHANGE UP (ref 1.8–7.4)
NEUTROPHILS NFR BLD AUTO: 54.8 % — SIGNIFICANT CHANGE UP (ref 43–77)
NITRITE UR-MCNC: NEGATIVE — SIGNIFICANT CHANGE UP
PH UR: 6.5 — SIGNIFICANT CHANGE UP (ref 5–8)
PLATELET # BLD AUTO: 463 K/UL — HIGH (ref 150–400)
POTASSIUM SERPL-MCNC: 3.8 MMOL/L — SIGNIFICANT CHANGE UP (ref 3.5–5.3)
POTASSIUM SERPL-SCNC: 3.8 MMOL/L — SIGNIFICANT CHANGE UP (ref 3.5–5.3)
PROT SERPL-MCNC: 7.4 GM/DL — SIGNIFICANT CHANGE UP (ref 6–8.3)
PROT UR-MCNC: NEGATIVE MG/DL — SIGNIFICANT CHANGE UP
PROTHROM AB SERPL-ACNC: 10.1 SEC — SIGNIFICANT CHANGE UP (ref 9.5–13)
RBC # BLD: 4 M/UL — SIGNIFICANT CHANGE UP (ref 3.8–5.2)
RBC # FLD: 13.4 % — SIGNIFICANT CHANGE UP (ref 10.3–14.5)
RBC CASTS # UR COMP ASSIST: 0 /HPF — SIGNIFICANT CHANGE UP (ref 0–4)
SODIUM SERPL-SCNC: 130 MMOL/L — LOW (ref 135–145)
SP GR SPEC: 1.01 — SIGNIFICANT CHANGE UP (ref 1–1.03)
SQUAMOUS # UR AUTO: 0 /HPF — SIGNIFICANT CHANGE UP (ref 0–5)
TROPONIN I, HIGH SENSITIVITY RESULT: 34.63 NG/L — SIGNIFICANT CHANGE UP
UROBILINOGEN FLD QL: 0.2 MG/DL — SIGNIFICANT CHANGE UP (ref 0.2–1)
WBC # BLD: 7.36 K/UL — SIGNIFICANT CHANGE UP (ref 3.8–10.5)
WBC # FLD AUTO: 7.36 K/UL — SIGNIFICANT CHANGE UP (ref 3.8–10.5)
WBC UR QL: 0 /HPF — SIGNIFICANT CHANGE UP (ref 0–5)

## 2024-06-22 PROCEDURE — 80053 COMPREHEN METABOLIC PANEL: CPT

## 2024-06-22 PROCEDURE — 72125 CT NECK SPINE W/O DYE: CPT | Mod: 26,MC

## 2024-06-22 PROCEDURE — 36000 PLACE NEEDLE IN VEIN: CPT | Mod: XU

## 2024-06-22 PROCEDURE — 82550 ASSAY OF CK (CPK): CPT

## 2024-06-22 PROCEDURE — 86901 BLOOD TYPING SEROLOGIC RH(D): CPT

## 2024-06-22 PROCEDURE — 70450 CT HEAD/BRAIN W/O DYE: CPT | Mod: MC

## 2024-06-22 PROCEDURE — 73562 X-RAY EXAM OF KNEE 3: CPT | Mod: 50

## 2024-06-22 PROCEDURE — 73552 X-RAY EXAM OF FEMUR 2/>: CPT | Mod: LT

## 2024-06-22 PROCEDURE — 74177 CT ABD & PELVIS W/CONTRAST: CPT | Mod: 26,MC

## 2024-06-22 PROCEDURE — 85025 COMPLETE CBC W/AUTO DIFF WBC: CPT

## 2024-06-22 PROCEDURE — 99285 EMERGENCY DEPT VISIT HI MDM: CPT | Mod: 25

## 2024-06-22 PROCEDURE — 86900 BLOOD TYPING SEROLOGIC ABO: CPT

## 2024-06-22 PROCEDURE — 71260 CT THORAX DX C+: CPT | Mod: 26,MC

## 2024-06-22 PROCEDURE — 85610 PROTHROMBIN TIME: CPT

## 2024-06-22 PROCEDURE — 73562 X-RAY EXAM OF KNEE 3: CPT | Mod: 26,50

## 2024-06-22 PROCEDURE — 73502 X-RAY EXAM HIP UNI 2-3 VIEWS: CPT | Mod: 26,LT

## 2024-06-22 PROCEDURE — 74177 CT ABD & PELVIS W/CONTRAST: CPT | Mod: MC

## 2024-06-22 PROCEDURE — 93005 ELECTROCARDIOGRAM TRACING: CPT

## 2024-06-22 PROCEDURE — 70450 CT HEAD/BRAIN W/O DYE: CPT | Mod: 26,MC

## 2024-06-22 PROCEDURE — 93010 ELECTROCARDIOGRAM REPORT: CPT

## 2024-06-22 PROCEDURE — 86850 RBC ANTIBODY SCREEN: CPT

## 2024-06-22 PROCEDURE — 72125 CT NECK SPINE W/O DYE: CPT | Mod: MC

## 2024-06-22 PROCEDURE — 99285 EMERGENCY DEPT VISIT HI MDM: CPT

## 2024-06-22 PROCEDURE — 71260 CT THORAX DX C+: CPT | Mod: MC

## 2024-06-22 PROCEDURE — 81001 URINALYSIS AUTO W/SCOPE: CPT

## 2024-06-22 PROCEDURE — 36415 COLL VENOUS BLD VENIPUNCTURE: CPT

## 2024-06-22 PROCEDURE — 73552 X-RAY EXAM OF FEMUR 2/>: CPT | Mod: 26,LT

## 2024-06-22 PROCEDURE — 85730 THROMBOPLASTIN TIME PARTIAL: CPT

## 2024-06-22 PROCEDURE — 73502 X-RAY EXAM HIP UNI 2-3 VIEWS: CPT | Mod: LT

## 2024-06-22 PROCEDURE — 84484 ASSAY OF TROPONIN QUANT: CPT

## 2024-06-22 RX ORDER — ACETAMINOPHEN 500 MG
650 TABLET ORAL ONCE
Refills: 0 | Status: COMPLETED | OUTPATIENT
Start: 2024-06-22 | End: 2024-06-22

## 2024-06-22 RX ORDER — SODIUM CHLORIDE 9 MG/ML
500 INJECTION INTRAMUSCULAR; INTRAVENOUS; SUBCUTANEOUS ONCE
Refills: 0 | Status: COMPLETED | OUTPATIENT
Start: 2024-06-22 | End: 2024-06-22

## 2024-06-22 RX ADMIN — Medication 650 MILLIGRAM(S): at 17:18

## 2024-06-22 RX ADMIN — SODIUM CHLORIDE 500 MILLILITER(S): 9 INJECTION INTRAMUSCULAR; INTRAVENOUS; SUBCUTANEOUS at 17:18
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
## 2024-06-22 NOTE — ED ADULT NURSE NOTE - NSFALLRISKINTERV_ED_ALL_ED
Assistance OOB with selected safe patient handling equipment if applicable/Assistance with ambulation/Communicate fall risk and risk factors to all staff, patient, and family/Monitor gait and stability/Provide visual cue: yellow wristband, yellow gown, etc/Reinforce activity limits and safety measures with patient and family/Call bell, personal items and telephone in reach/Instruct patient to call for assistance before getting out of bed/chair/stretcher/Non-slip footwear applied when patient is off stretcher/[place] to call system/Physically safe environment - no spills, clutter or unnecessary equipment/Purposeful Proactive Rounding/Room/bathroom lighting operational, light cord in reach
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
## 2024-06-22 NOTE — ED PROVIDER NOTE - NEUROLOGICAL, MLM
Alert and oriented, no focal deficits, no motor or sensory deficits. Alert and oriented, no focal deficits, no motor or sensory deficits. CNs intact, normal speech

## 2024-06-22 NOTE — ED PROVIDER NOTE - MUSCULOSKELETAL, MLM
Mild anterior chest wall tenderness w/ overlying abrasion. L SLR 15 to 20 degrees d/t knee and hip pain. No swelling or deformity. LLE distal motor sensory intact. R knee slight tenderness w/o effusion, good AROM. MAEx4. No gross deformity. Neck non-tender supple w/o pain. Mild anterior chest wall tenderness w/ overlying abrasion. L SLR only 15 to 20 degrees d/t knee and hip pain, no swelling or deformity. LLE distal motor/sensory intact. R knee slight tenderness w/o effusion, good AROM. MAEx4. No gross deformity. Neck non-tender, supple w/o pain.

## 2024-06-22 NOTE — ED PROVIDER NOTE - NSICDXPASTMEDICALHX_GEN_ALL_CORE_FT
PAST MEDICAL HISTORY:  Bipolar 1 disorder     Breast cancer     Chronic obstructive pulmonary disease (COPD)     COVID-19 vaccine series completed     Depression     Lumbar stenosis     Polycythemia vera     Spinal stenosis of lumbosacral region     Tobacco use disorder

## 2024-06-22 NOTE — ED PROVIDER NOTE - NSICDXPASTSURGICALHX_GEN_ALL_CORE_FT
PAST SURGICAL HISTORY:  Cataract     S/P breast lumpectomy     S/P hip replacement, left     S/P hip replacement, right     S/P hysterectomy
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
## 2024-06-22 NOTE — ED PROVIDER NOTE - PATIENT PORTAL LINK FT
You can access the FollowMyHealth Patient Portal offered by [facility] by registering at the following website: [email or web address]. By joining [employer]’s FollowMyHealth portal, you will also be able to view your health information using other applications (apps) compatible with our system.

## 2024-06-22 NOTE — ED PROVIDER NOTE - CPE EDP EYES NORM
----- Message from Chris Eugene sent at 2/28/2024  2:22 PM CST -----  Contact: Pt [phone]  [email or web address]     Patient is calling for Medical Advice regarding: Strong urge to urinate and can't hold it    How long has patient had these symptoms: 3 weeks    Pharmacy name and phone#: [facility] [address] [place], [place] - [address] [facility] Phone: [phone] Fax: [phone]          
Patient notified
Please advise     Called pt. Pt stated  she has Strong urge to urinate and can't hold it. No vignal discharge, no abdomen pain, No odor are frequency.  
Pt needs urine culture and UA, orders placed  
Spoke to pt son. He stated he would let her mother know that Urine culture and Urinalysis was ordered and to go to any [facility] lab. Pt son Vu   
Vitamin D much better.  Continue supplement
normal...

## 2024-06-22 NOTE — ED PROVIDER NOTE - CONSTITUTIONAL, MLM
Well appearing, awake, alert, oriented to person, place, time/situation and in no apparent distress. normal... Well appearing elderly WF, awake, alert, oriented to person, place, time/situation and in no apparent distress.
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
## 2024-06-28 PROBLEM — Z51.81 ENCOUNTER FOR MONITORING OF HYDROXYUREA THERAPY: Status: ACTIVE | Noted: 2023-04-17

## 2024-06-28 PROBLEM — Z15.89 JAK-2 GENE MUTATION: Status: ACTIVE | Noted: 2018-03-09

## 2024-06-28 NOTE — REVIEW OF SYSTEMS
[Patient Intake Form Reviewed] : Patient intake form was reviewed [Cough] : cough [Negative] : Heme/Lymph [Muscle Weakness] : muscle weakness [Depression] : depression [FreeTextEntry2] : gained a little weight [FreeTextEntry8] : frequency [FreeTextEntry9] : back pains [de-identified] : sleep problems

## 2024-06-28 NOTE — ASSESSMENT
[FreeTextEntry1] : Patient is a 79 y.o. with a PMH of an ER++, HER2-, right sided stage IA breast cancer in 2015 s/p lumpectomy and XRT. Did not tolerate adjuvant hormonal therapy. We are now following her for a JAK2+ P. vera since 2016.  9/2020 - Started Hydrea 500 mg daily.  Counts have been well controlled.   H/H now slightly elevated today.  Reviewed with Dr. Kee. Will have patient undergo phlebotomy today for increased H/H. Will keep Hydrea dose same for now.  (May be hard for patient to remember dose change) Hydrea renewed.  Encouraged to remain well hydrated, especially during the hot summer months and while in [place].  Patient was asked to repeat labs in 3 months. PE in 6 months.   PCP NIKHIL REDDING.

## 2024-06-28 NOTE — HISTORY OF PRESENT ILLNESS
[de-identified] : MEGHAN HEMPHILL is a 79 y.o. with a PMH significant for who we are following for an ER+, HER2- stage IA breast cancer and now a JAK2+ MPD.  5/21/15 - Presented with a 6mm abnormality in the right breast on routine mammo at 6:00.  7/29/15 - Biopsy - well differentiated IDC; ER 90%, WI 90%, HER2 +1/neg. 9/9/15 - Right breast lumpectomy and SLND (Primitivoi) for a T1N0 = stage IA breast cancer. s/p adjuvant XRT 11/2015 - Fall 2016 - Adjuvant Arimidex.  She discontinued Arimidex due to musculoskeletal side effects.  She did not want to try any other hormonal therapies.   Routine blood work revealed borderline elevated H/H.   7/9/16 - JAK2 mutation was detected. S/p phlebotomies. Thrombocytosis.  9/2020 - Started Hydrea.  On 500 mg daily.  10/23/23 - Noted to have slight decrease in Hgb (Baseline ~ 15 -> 13.9) - was attributed to her recent hip replacement surgery 9/28/23.  [de-identified] : Overall poor historian.   States she has been compliant with her Hydrea.   Still smoking ~1PPD.  Major issue is low back pain.  She reports that she is going to [place] on 7/1/24 for 10 days.  Denies any other changes in her family, medical, or social history since her last visit of 1/17/24.

## 2024-06-28 NOTE — PHYSICAL EXAM
[Normal] : affect appropriate [de-identified] : limited ROM right hip, using cane [de-identified] : frail elderly woman [de-identified] : anicteric [de-identified] : no c/c/e [de-identified] : no rashes

## 2024-08-26 ENCOUNTER — RX RENEWAL (OUTPATIENT)
Age: 79
End: 2024-08-26

## 2024-09-03 ENCOUNTER — APPOINTMENT (OUTPATIENT)
Dept: UROLOGY | Facility: CLINIC | Age: 79
End: 2024-09-03
Payer: MEDICARE

## 2024-09-03 VITALS
WEIGHT: 138 LBS | HEART RATE: 62 BPM | DIASTOLIC BLOOD PRESSURE: 78 MMHG | OXYGEN SATURATION: 98 % | RESPIRATION RATE: 17 BRPM | BODY MASS INDEX: 23.56 KG/M2 | SYSTOLIC BLOOD PRESSURE: 127 MMHG | HEIGHT: 64 IN

## 2024-09-03 DIAGNOSIS — N34.3 URETHRAL SYNDROME, UNSPECIFIED: ICD-10-CM

## 2024-09-03 DIAGNOSIS — R35.0 FREQUENCY OF MICTURITION: ICD-10-CM

## 2024-09-03 PROCEDURE — 99213 OFFICE O/P EST LOW 20 MIN: CPT

## 2024-09-03 NOTE — HISTORY OF PRESENT ILLNESS
[FreeTextEntry1] : MEGHAN HEMPHILL is a 79 year female patient presents with increased urinary tract symptoms. Pt. had been doing well on her current medications but has recently noted a relapse in symptomatology. She reports increased frequency and urgency of urination.

## 2024-09-03 NOTE — ADDENDUM
[FreeTextEntry1] : I, Sona Marks, acted as a scribe on behalf of Dr. Antony 09/03/2024.   All medical record entries made by Sona Marks were at my, Dr.Kip Antony, direction and personally dictated by me on  09/03/2024. I have reviewed the chart and agree that the record accurately reflects my personal performance of the history, physical exam, assessment, and plan. I have also personally directed, reviewed, and agreed with the chart.

## 2024-09-03 NOTE — END OF VISIT
[FreeTextEntry3] : Recommendations:  Renal and bladder sonogram ordered. Cystoscopy with urodermics planned for the near future. Urine sent for culture analysis and cytology.

## 2024-09-04 LAB
APPEARANCE: CLEAR
BACTERIA: NEGATIVE /HPF
BILIRUBIN URINE: NEGATIVE
BLOOD URINE: NEGATIVE
CAST: 0 /LPF
COLOR: YELLOW
EPITHELIAL CELLS: 2 /HPF
GLUCOSE QUALITATIVE U: NEGATIVE MG/DL
KETONES URINE: NEGATIVE MG/DL
LEUKOCYTE ESTERASE URINE: ABNORMAL
MICROSCOPIC-UA: NORMAL
NITRITE URINE: NEGATIVE
PH URINE: 7
PROTEIN URINE: NEGATIVE MG/DL
RED BLOOD CELLS URINE: 0 /HPF
REVIEW: NORMAL
SPECIFIC GRAVITY URINE: 1.01
UROBILINOGEN URINE: 0.2 MG/DL
WHITE BLOOD CELLS URINE: 1 /HPF

## 2024-09-05 LAB — BACTERIA UR CULT: NORMAL

## 2024-09-06 LAB — URINE CYTOLOGY: NORMAL

## 2024-09-13 ENCOUNTER — OUTPATIENT (OUTPATIENT)
Dept: OUTPATIENT SERVICES | Facility: HOSPITAL | Age: 79
LOS: 1 days | Discharge: HOME | End: 2024-09-13

## 2024-09-13 DIAGNOSIS — Z96.642 PRESENCE OF LEFT ARTIFICIAL HIP JOINT: Chronic | ICD-10-CM

## 2024-09-13 DIAGNOSIS — Z90.710 ACQUIRED ABSENCE OF BOTH CERVIX AND UTERUS: Chronic | ICD-10-CM

## 2024-09-13 DIAGNOSIS — D45 POLYCYTHEMIA VERA: ICD-10-CM

## 2024-09-13 DIAGNOSIS — H26.9 UNSPECIFIED CATARACT: Chronic | ICD-10-CM

## 2024-09-13 DIAGNOSIS — Z98.890 OTHER SPECIFIED POSTPROCEDURAL STATES: Chronic | ICD-10-CM

## 2024-09-13 DIAGNOSIS — Z96.641 PRESENCE OF RIGHT ARTIFICIAL HIP JOINT: Chronic | ICD-10-CM

## 2024-09-13 DIAGNOSIS — C50.911 MALIGNANT NEOPLASM OF UNSPECIFIED SITE OF RIGHT FEMALE BREAST: ICD-10-CM

## 2024-09-17 ENCOUNTER — APPOINTMENT (OUTPATIENT)
Dept: UROLOGY | Facility: CLINIC | Age: 79
End: 2024-09-17
Payer: MEDICARE

## 2024-09-17 ENCOUNTER — APPOINTMENT (OUTPATIENT)
Dept: UROLOGY | Facility: CLINIC | Age: 79
End: 2024-09-17

## 2024-09-17 VITALS
RESPIRATION RATE: 16 BRPM | BODY MASS INDEX: 23.56 KG/M2 | HEIGHT: 64 IN | SYSTOLIC BLOOD PRESSURE: 121 MMHG | WEIGHT: 138 LBS | DIASTOLIC BLOOD PRESSURE: 72 MMHG | OXYGEN SATURATION: 97 % | HEART RATE: 68 BPM

## 2024-09-17 DIAGNOSIS — N34.3 URETHRAL SYNDROME, UNSPECIFIED: ICD-10-CM

## 2024-09-17 PROCEDURE — 52285 CYSTOSCOPY AND TREATMENT: CPT

## 2024-09-18 ENCOUNTER — RESULT REVIEW (OUTPATIENT)
Age: 79
End: 2024-09-18

## 2024-09-18 ENCOUNTER — APPOINTMENT (OUTPATIENT)
Dept: HEMATOLOGY ONCOLOGY | Facility: CLINIC | Age: 79
End: 2024-09-18

## 2024-09-18 LAB
BASOPHILS # BLD AUTO: 0.04 K/UL — SIGNIFICANT CHANGE UP (ref 0–0.2)
BASOPHILS NFR BLD AUTO: 0.7 % — SIGNIFICANT CHANGE UP (ref 0–2)
EOSINOPHIL # BLD AUTO: 0.08 K/UL — SIGNIFICANT CHANGE UP (ref 0–0.5)
EOSINOPHIL NFR BLD AUTO: 1.5 % — SIGNIFICANT CHANGE UP (ref 0–6)
HCT VFR BLD CALC: 42.4 % — SIGNIFICANT CHANGE UP (ref 34.5–45)
HGB BLD-MCNC: 15.1 G/DL — SIGNIFICANT CHANGE UP (ref 11.5–15.5)
IMM GRANULOCYTES NFR BLD AUTO: 0.2 % — SIGNIFICANT CHANGE UP (ref 0–0.9)
LYMPHOCYTES # BLD AUTO: 1.72 K/UL — SIGNIFICANT CHANGE UP (ref 1–3.3)
LYMPHOCYTES # BLD AUTO: 31.9 % — SIGNIFICANT CHANGE UP (ref 13–44)
MCHC RBC-ENTMCNC: 35.1 PG — HIGH (ref 27–34)
MCHC RBC-ENTMCNC: 35.6 GM/DL — SIGNIFICANT CHANGE UP (ref 32–36)
MCV RBC AUTO: 98.6 FL — SIGNIFICANT CHANGE UP (ref 80–100)
MONOCYTES # BLD AUTO: 0.64 K/UL — SIGNIFICANT CHANGE UP (ref 0–0.9)
MONOCYTES NFR BLD AUTO: 11.9 % — SIGNIFICANT CHANGE UP (ref 2–14)
NEUTROPHILS # BLD AUTO: 2.91 K/UL — SIGNIFICANT CHANGE UP (ref 1.8–7.4)
NEUTROPHILS NFR BLD AUTO: 53.8 % — SIGNIFICANT CHANGE UP (ref 43–77)
NRBC # BLD: 0 /100 WBCS — SIGNIFICANT CHANGE UP (ref 0–0)
PLATELET # BLD AUTO: 371 K/UL — SIGNIFICANT CHANGE UP (ref 150–400)
RBC # BLD: 4.3 M/UL — SIGNIFICANT CHANGE UP (ref 3.8–5.2)
RBC # FLD: 13.3 % — SIGNIFICANT CHANGE UP (ref 10.3–14.5)
WBC # BLD: 5.4 K/UL — SIGNIFICANT CHANGE UP (ref 3.8–10.5)
WBC # FLD AUTO: 5.4 K/UL — SIGNIFICANT CHANGE UP (ref 3.8–10.5)

## 2024-09-19 ENCOUNTER — NON-APPOINTMENT (OUTPATIENT)
Age: 79
End: 2024-09-19

## 2024-09-20 ENCOUNTER — APPOINTMENT (OUTPATIENT)
Dept: PAIN MANAGEMENT | Facility: CLINIC | Age: 79
End: 2024-09-20
Payer: MEDICARE

## 2024-09-20 DIAGNOSIS — M54.16 RADICULOPATHY, LUMBAR REGION: ICD-10-CM

## 2024-09-20 PROCEDURE — 99203 OFFICE O/P NEW LOW 30 MIN: CPT

## 2024-09-20 NOTE — HISTORY OF PRESENT ILLNESS
[FreeTextEntry1] : THIS PLEASANT PATIENT IS 79 year OLD  female  WHO PRESENT TODAY IN OFFICE WITH LSPINE RADATING ACROSS    DATE OF INJURY/ONSET  ON GOING ISSUE FOR THE PAST 6MMTH      PAIN LEVEL:8-10/10     MECHANISM OF INJURY: UNKOWN INJURAY         QUALITY OF SYMPTOMS:  DULL,ACHE, PRESSURE, UNABLE TO WALK      IMPROVES WITH:  NOTHING      WORSE WITH:  WALKING, SITTING, STANDING      PRIOR TREATMENT:  06/01/2024 PATIENT IS PRESENTING WITH ACUTE/SUB-ACUTE RADICULAR PAIN WITH IMPAIRMENT IN ADLs AND FUNCTIONALITY. THE PATIENT HAS NOT RESPONDED TO CONSERVATIVE CARE INCLUDING NSAID THERAPY AND/OR PHYSICAL THERAPY 2-3X A WEEK FOR 6 WEEK.           PRIOR IMAGING:  MRI OF LSPINE 1/27/2024       SCHOOL/SPORT/POSITION/OCCUPATION:       ADDITIONAL INFORMATION

## 2024-09-20 NOTE — ASSESSMENT
[FreeTextEntry1] : Interim history The patient returns with pain that has been treated adequately in the past with epidural steroid injections.  The patient's complaints are consistent with radiculopathy. Patient's ADL's increase with prior VIKASH.   The last injection gave greater than 60% reduction of the pain but now there is a return of symptoms. The patient is requesting a subsequent epidural steroid injection to alleviate pain and improve functional ability and quality of life.  Patient is a candidate. Objective findings Since the last visit, there have been no new imaging studies. The patient has no new symptoms except the return of the their pain complaints. Motor and sensory function is unchanged. Plan Spoke to patient about epidural steroid injections. Explained risks, benefits and alternatives including but not limited to the risk of infection, bleeding, headache, syncopal episode, failure to resolve issues, allergic reaction, symptom recurrence, allergic reaction, nerve injury, and increased pain. The patient understands the risks. All questions were answered. The patient is willing to proceed.  This note was generated by using Dragon medical dictation software.  A reasonable effort has been made for proofreading its contents, but typos may still remain.  If there are any questions or points of clarification needed, please notify my office.

## 2024-09-23 ENCOUNTER — APPOINTMENT (OUTPATIENT)
Dept: ULTRASOUND IMAGING | Facility: CLINIC | Age: 79
End: 2024-09-23

## 2024-09-25 ENCOUNTER — TRANSCRIPTION ENCOUNTER (OUTPATIENT)
Age: 79
End: 2024-09-25

## 2024-09-25 NOTE — ASU PATIENT PROFILE, ADULT - NSICDXPASTMEDICALHX_GEN_ALL_CORE_FT
PAST MEDICAL HISTORY:  Bipolar 1 disorder     Breast cancer     Chronic obstructive pulmonary disease (COPD)     COVID-19 vaccine series completed     Depression     Lumbar stenosis     Polycythemia vera     Spinal stenosis of lumbosacral region     Tobacco use disorder      PAST MEDICAL HISTORY:  Bipolar 1 disorder     Breast cancer     Chronic obstructive pulmonary disease (COPD)     COVID-19 vaccine series completed     Depression     Hypertension     Lumbar stenosis     Polycythemia vera     Spinal stenosis of lumbosacral region     Tobacco use disorder

## 2024-09-25 NOTE — ASU DISCHARGE PLAN (ADULT/PEDIATRIC) - NS MD DC FALL RISK RISK
For information on Fall & Injury Prevention, visit: [email or web address] OR  [email or web address] OR  https://www.cdc.gov/steadi/patient.html

## 2024-09-26 ENCOUNTER — OUTPATIENT (OUTPATIENT)
Dept: OUTPATIENT SERVICES | Facility: HOSPITAL | Age: 79
LOS: 1 days | End: 2024-09-26
Payer: MEDICARE

## 2024-09-26 ENCOUNTER — APPOINTMENT (OUTPATIENT)
Dept: ORTHOPEDIC SURGERY | Facility: HOSPITAL | Age: 79
End: 2024-09-26
Payer: MEDICARE

## 2024-09-26 VITALS
SYSTOLIC BLOOD PRESSURE: 140 MMHG | RESPIRATION RATE: 20 BRPM | DIASTOLIC BLOOD PRESSURE: 60 MMHG | OXYGEN SATURATION: 99 % | HEART RATE: 75 BPM

## 2024-09-26 VITALS
RESPIRATION RATE: 20 BRPM | TEMPERATURE: 98 F | SYSTOLIC BLOOD PRESSURE: 130 MMHG | WEIGHT: 139.99 LBS | HEIGHT: 60 IN | DIASTOLIC BLOOD PRESSURE: 83 MMHG | HEART RATE: 70 BPM | OXYGEN SATURATION: 97 %

## 2024-09-26 DIAGNOSIS — Z98.890 OTHER SPECIFIED POSTPROCEDURAL STATES: Chronic | ICD-10-CM

## 2024-09-26 DIAGNOSIS — Z96.641 PRESENCE OF RIGHT ARTIFICIAL HIP JOINT: Chronic | ICD-10-CM

## 2024-09-26 DIAGNOSIS — Z96.642 PRESENCE OF LEFT ARTIFICIAL HIP JOINT: Chronic | ICD-10-CM

## 2024-09-26 DIAGNOSIS — Z90.710 ACQUIRED ABSENCE OF BOTH CERVIX AND UTERUS: Chronic | ICD-10-CM

## 2024-09-26 DIAGNOSIS — H26.9 UNSPECIFIED CATARACT: Chronic | ICD-10-CM

## 2024-09-26 PROCEDURE — 62323 NJX INTERLAMINAR LMBR/SAC: CPT

## 2024-09-26 NOTE — ASU PREOP CHECKLIST - NSWEIGHTCALCTOOLDRUG_GEN_A_CORE
used PT. present to ED c/o difficulty  swallowing for the past 2 days. Pt. also c/o PT. present to ED c/o difficulty  swallowing for the past 2 days. Pt. dx with esophageal carcinoma and has received chemotherapy. Last chemo tx was 1 month ago. Pt. follows with Dr. Delacruz for oncology. Pt. states that he has not been able to tolerate any liquid or solid for the past 2 days. PT. also c/o mild abdominal pain. PT. present to ED c/o difficulty  swallowing for the past 2 days. Pt. dx with esophageal carcinoma and has received chemotherapy. Last chemo tx was 1 month ago. Pt. follows with Dr. Delacruz for oncology. Pt. states that he has not been able to tolerate any liquid or solid for the past 2 days. Pt. c/o weakness. PT. also c/o mild abdominal pain. Pt. had dilation and esophageal stent june 2018. Pt. was recently admitted for failure to thrive.

## 2024-09-26 NOTE — ASU PREOP CHECKLIST - SURGICAL CONSENT
Most likely related to degenerative disc disease.  We will start Celebrex, home physical therapy initially with exercises and formal physical therapy if no better.  May need imaging eventually.  
done

## 2024-10-07 ENCOUNTER — APPOINTMENT (OUTPATIENT)
Dept: UROLOGY | Facility: CLINIC | Age: 79
End: 2024-10-07
Payer: MEDICARE

## 2024-10-07 VITALS
BODY MASS INDEX: 23.56 KG/M2 | HEIGHT: 64 IN | WEIGHT: 138 LBS | SYSTOLIC BLOOD PRESSURE: 137 MMHG | DIASTOLIC BLOOD PRESSURE: 82 MMHG

## 2024-10-07 DIAGNOSIS — N34.3 URETHRAL SYNDROME, UNSPECIFIED: ICD-10-CM

## 2024-10-07 DIAGNOSIS — R35.0 FREQUENCY OF MICTURITION: ICD-10-CM

## 2024-10-07 PROCEDURE — 51729 CYSTOMETROGRAM W/VP&UP: CPT

## 2024-10-07 PROCEDURE — 51798 US URINE CAPACITY MEASURE: CPT

## 2024-10-07 PROCEDURE — 51784 ANAL/URINARY MUSCLE STUDY: CPT

## 2024-10-07 PROCEDURE — 51797 INTRAABDOMINAL PRESSURE TEST: CPT

## 2024-10-22 ENCOUNTER — OFFICE (OUTPATIENT)
Facility: LOCATION | Age: 79
Setting detail: OPHTHALMOLOGY
End: 2024-10-22
Payer: MEDICARE

## 2024-10-22 DIAGNOSIS — H35.363: ICD-10-CM

## 2024-10-22 DIAGNOSIS — H43.813: ICD-10-CM

## 2024-10-22 DIAGNOSIS — H26.492: ICD-10-CM

## 2024-10-22 DIAGNOSIS — Z96.1: ICD-10-CM

## 2024-10-22 DIAGNOSIS — H43.393: ICD-10-CM

## 2024-10-22 PROCEDURE — 92014 COMPRE OPH EXAM EST PT 1/>: CPT | Performed by: OPHTHALMOLOGY

## 2024-10-22 PROCEDURE — 92134 CPTRZ OPH DX IMG PST SGM RTA: CPT | Performed by: OPHTHALMOLOGY

## 2024-10-22 ASSESSMENT — CONFRONTATIONAL VISUAL FIELD TEST (CVF)
OS_FINDINGS: FULL
OD_FINDINGS: FULL

## 2024-10-22 ASSESSMENT — TONOMETRY
OD_IOP_MMHG: 16
OS_IOP_MMHG: 16

## 2024-10-23 PROBLEM — Z96.1 PSEUDOPHAKIA ; BOTH EYES: Status: ACTIVE | Noted: 2024-10-22

## 2024-10-23 ASSESSMENT — REFRACTION_AUTOREFRACTION
OD_CYLINDER: +0.75
OS_SPHERE: UA
OD_SPHERE: -0.50
OD_AXIS: 175

## 2024-10-23 ASSESSMENT — VISUAL ACUITY
OD_BCVA: 20/40-2
OS_BCVA: 20/30

## 2024-11-06 DIAGNOSIS — R35.0 FREQUENCY OF MICTURITION: ICD-10-CM

## 2024-11-06 RX ORDER — MIRABEGRON 50 MG/1
50 TABLET, EXTENDED RELEASE ORAL DAILY
Qty: 30 | Refills: 6 | Status: ACTIVE | COMMUNITY
Start: 2024-11-06 | End: 1900-01-01

## 2024-12-04 ENCOUNTER — OUTPATIENT (OUTPATIENT)
Dept: OUTPATIENT SERVICES | Facility: HOSPITAL | Age: 79
LOS: 1 days | Discharge: HOME | End: 2024-12-04

## 2024-12-04 DIAGNOSIS — H26.9 UNSPECIFIED CATARACT: Chronic | ICD-10-CM

## 2024-12-04 DIAGNOSIS — Z90.710 ACQUIRED ABSENCE OF BOTH CERVIX AND UTERUS: Chronic | ICD-10-CM

## 2024-12-04 DIAGNOSIS — D45 POLYCYTHEMIA VERA: ICD-10-CM

## 2024-12-04 DIAGNOSIS — Z96.642 PRESENCE OF LEFT ARTIFICIAL HIP JOINT: Chronic | ICD-10-CM

## 2024-12-04 DIAGNOSIS — Z98.890 OTHER SPECIFIED POSTPROCEDURAL STATES: Chronic | ICD-10-CM

## 2024-12-04 DIAGNOSIS — C50.911 MALIGNANT NEOPLASM OF UNSPECIFIED SITE OF RIGHT FEMALE BREAST: ICD-10-CM

## 2024-12-04 DIAGNOSIS — Z96.641 PRESENCE OF RIGHT ARTIFICIAL HIP JOINT: Chronic | ICD-10-CM

## 2024-12-04 PROBLEM — I10 ESSENTIAL (PRIMARY) HYPERTENSION: Chronic | Status: ACTIVE | Noted: 2024-09-26

## 2024-12-11 ENCOUNTER — RESULT REVIEW (OUTPATIENT)
Age: 79
End: 2024-12-11

## 2024-12-11 ENCOUNTER — APPOINTMENT (OUTPATIENT)
Dept: INFUSION THERAPY | Facility: CLINIC | Age: 79
End: 2024-12-11

## 2024-12-11 ENCOUNTER — APPOINTMENT (OUTPATIENT)
Dept: HEMATOLOGY ONCOLOGY | Facility: CLINIC | Age: 79
End: 2024-12-11
Payer: MEDICARE

## 2024-12-11 VITALS
SYSTOLIC BLOOD PRESSURE: 129 MMHG | DIASTOLIC BLOOD PRESSURE: 82 MMHG | BODY MASS INDEX: 24 KG/M2 | OXYGEN SATURATION: 95 % | WEIGHT: 140.56 LBS | HEART RATE: 91 BPM | TEMPERATURE: 97.3 F | HEIGHT: 64 IN

## 2024-12-11 DIAGNOSIS — Z51.81 ENCOUNTER FOR THERAPEUTIC DRUG LVL MONITORING: ICD-10-CM

## 2024-12-11 DIAGNOSIS — Z79.64 ENCOUNTER FOR THERAPEUTIC DRUG LVL MONITORING: ICD-10-CM

## 2024-12-11 DIAGNOSIS — Z15.89 GENETIC SUSCEPTIBILITY TO OTHER DISEASE: ICD-10-CM

## 2024-12-11 DIAGNOSIS — D45 POLYCYTHEMIA VERA: ICD-10-CM

## 2024-12-11 LAB
BASOPHILS # BLD AUTO: 0.06 K/UL — SIGNIFICANT CHANGE UP (ref 0–0.2)
BASOPHILS NFR BLD AUTO: 1 % — SIGNIFICANT CHANGE UP (ref 0–2)
EOSINOPHIL # BLD AUTO: 0.22 K/UL — SIGNIFICANT CHANGE UP (ref 0–0.5)
EOSINOPHIL NFR BLD AUTO: 3.6 % — SIGNIFICANT CHANGE UP (ref 0–6)
HCT VFR BLD CALC: 46.2 % — HIGH (ref 34.5–45)
HGB BLD-MCNC: 15.7 G/DL — HIGH (ref 11.5–15.5)
IMM GRANULOCYTES NFR BLD AUTO: 0.3 % — SIGNIFICANT CHANGE UP (ref 0–0.9)
LYMPHOCYTES # BLD AUTO: 1.55 K/UL — SIGNIFICANT CHANGE UP (ref 1–3.3)
LYMPHOCYTES # BLD AUTO: 25.1 % — SIGNIFICANT CHANGE UP (ref 13–44)
MCHC RBC-ENTMCNC: 34 G/DL — SIGNIFICANT CHANGE UP (ref 32–36)
MCHC RBC-ENTMCNC: 34.2 PG — HIGH (ref 27–34)
MCV RBC AUTO: 100.7 FL — HIGH (ref 80–100)
MONOCYTES # BLD AUTO: 0.78 K/UL — SIGNIFICANT CHANGE UP (ref 0–0.9)
MONOCYTES NFR BLD AUTO: 12.6 % — SIGNIFICANT CHANGE UP (ref 2–14)
NEUTROPHILS # BLD AUTO: 3.54 K/UL — SIGNIFICANT CHANGE UP (ref 1.8–7.4)
NEUTROPHILS NFR BLD AUTO: 57.4 % — SIGNIFICANT CHANGE UP (ref 43–77)
NRBC # BLD: 0 /100 WBCS — SIGNIFICANT CHANGE UP (ref 0–0)
NRBC BLD-RTO: 0 /100 WBCS — SIGNIFICANT CHANGE UP (ref 0–0)
PLATELET # BLD AUTO: 350 K/UL — SIGNIFICANT CHANGE UP (ref 150–400)
RBC # BLD: 4.59 M/UL — SIGNIFICANT CHANGE UP (ref 3.8–5.2)
RBC # FLD: 14.1 % — SIGNIFICANT CHANGE UP (ref 10.3–14.5)
WBC # BLD: 6.17 K/UL — SIGNIFICANT CHANGE UP (ref 3.8–10.5)
WBC # FLD AUTO: 6.17 K/UL — SIGNIFICANT CHANGE UP (ref 3.8–10.5)

## 2024-12-11 PROCEDURE — G2211 COMPLEX E/M VISIT ADD ON: CPT

## 2024-12-11 PROCEDURE — 99213 OFFICE O/P EST LOW 20 MIN: CPT

## 2025-01-09 ENCOUNTER — EMERGENCY (EMERGENCY)
Facility: HOSPITAL | Age: 80
LOS: 0 days | Discharge: HOME | End: 2025-01-09
Attending: EMERGENCY MEDICINE
Payer: MEDICARE

## 2025-01-09 VITALS
SYSTOLIC BLOOD PRESSURE: 140 MMHG | TEMPERATURE: 99 F | OXYGEN SATURATION: 94 % | RESPIRATION RATE: 17 BRPM | DIASTOLIC BLOOD PRESSURE: 85 MMHG | HEART RATE: 97 BPM

## 2025-01-09 VITALS — HEIGHT: 64 IN

## 2025-01-09 DIAGNOSIS — I10 ESSENTIAL (PRIMARY) HYPERTENSION: ICD-10-CM

## 2025-01-09 DIAGNOSIS — D45 POLYCYTHEMIA VERA: ICD-10-CM

## 2025-01-09 DIAGNOSIS — J44.9 CHRONIC OBSTRUCTIVE PULMONARY DISEASE, UNSPECIFIED: ICD-10-CM

## 2025-01-09 DIAGNOSIS — J06.9 ACUTE UPPER RESPIRATORY INFECTION, UNSPECIFIED: ICD-10-CM

## 2025-01-09 DIAGNOSIS — Z98.890 OTHER SPECIFIED POSTPROCEDURAL STATES: Chronic | ICD-10-CM

## 2025-01-09 DIAGNOSIS — M48.00 SPINAL STENOSIS, SITE UNSPECIFIED: ICD-10-CM

## 2025-01-09 DIAGNOSIS — Z96.641 PRESENCE OF RIGHT ARTIFICIAL HIP JOINT: Chronic | ICD-10-CM

## 2025-01-09 DIAGNOSIS — Z88.8 ALLERGY STATUS TO OTHER DRUGS, MEDICAMENTS AND BIOLOGICAL SUBSTANCES: ICD-10-CM

## 2025-01-09 DIAGNOSIS — J40 BRONCHITIS, NOT SPECIFIED AS ACUTE OR CHRONIC: ICD-10-CM

## 2025-01-09 DIAGNOSIS — C50.919 MALIGNANT NEOPLASM OF UNSPECIFIED SITE OF UNSPECIFIED FEMALE BREAST: ICD-10-CM

## 2025-01-09 DIAGNOSIS — B97.89 OTHER VIRAL AGENTS AS THE CAUSE OF DISEASES CLASSIFIED ELSEWHERE: ICD-10-CM

## 2025-01-09 DIAGNOSIS — Z90.710 ACQUIRED ABSENCE OF BOTH CERVIX AND UTERUS: Chronic | ICD-10-CM

## 2025-01-09 DIAGNOSIS — R10.10 UPPER ABDOMINAL PAIN, UNSPECIFIED: ICD-10-CM

## 2025-01-09 DIAGNOSIS — Z96.642 PRESENCE OF LEFT ARTIFICIAL HIP JOINT: Chronic | ICD-10-CM

## 2025-01-09 DIAGNOSIS — H26.9 UNSPECIFIED CATARACT: Chronic | ICD-10-CM

## 2025-01-09 DIAGNOSIS — R10.13 EPIGASTRIC PAIN: ICD-10-CM

## 2025-01-09 LAB
ALBUMIN SERPL ELPH-MCNC: 3.6 G/DL — SIGNIFICANT CHANGE UP (ref 3.3–5)
ALP SERPL-CCNC: 60 U/L — SIGNIFICANT CHANGE UP (ref 40–120)
ALT FLD-CCNC: 12 U/L — SIGNIFICANT CHANGE UP (ref 12–78)
ANION GAP SERPL CALC-SCNC: 2 MMOL/L — LOW (ref 5–17)
APTT BLD: 30.6 SEC — SIGNIFICANT CHANGE UP (ref 24.5–35.6)
AST SERPL-CCNC: 16 U/L — SIGNIFICANT CHANGE UP (ref 15–37)
BASOPHILS # BLD AUTO: 0.1 K/UL — SIGNIFICANT CHANGE UP (ref 0–0.2)
BASOPHILS NFR BLD AUTO: 1.5 % — SIGNIFICANT CHANGE UP (ref 0–2)
BILIRUB SERPL-MCNC: 0.4 MG/DL — SIGNIFICANT CHANGE UP (ref 0.2–1.2)
BUN SERPL-MCNC: 14 MG/DL — SIGNIFICANT CHANGE UP (ref 7–23)
CALCIUM SERPL-MCNC: 9.5 MG/DL — SIGNIFICANT CHANGE UP (ref 8.5–10.1)
CHLORIDE SERPL-SCNC: 100 MMOL/L — SIGNIFICANT CHANGE UP (ref 96–108)
CO2 SERPL-SCNC: 29 MMOL/L — SIGNIFICANT CHANGE UP (ref 22–31)
CREAT SERPL-MCNC: 0.56 MG/DL — SIGNIFICANT CHANGE UP (ref 0.5–1.3)
EGFR: 92 ML/MIN/1.73M2 — SIGNIFICANT CHANGE UP
EOSINOPHIL # BLD AUTO: 0.34 K/UL — SIGNIFICANT CHANGE UP (ref 0–0.5)
EOSINOPHIL NFR BLD AUTO: 5.2 % — SIGNIFICANT CHANGE UP (ref 0–6)
FLUAV AG NPH QL: SIGNIFICANT CHANGE UP
FLUBV AG NPH QL: SIGNIFICANT CHANGE UP
GLUCOSE SERPL-MCNC: 89 MG/DL — SIGNIFICANT CHANGE UP (ref 70–99)
HCT VFR BLD CALC: 41.8 % — SIGNIFICANT CHANGE UP (ref 34.5–45)
HGB BLD-MCNC: 14 G/DL — SIGNIFICANT CHANGE UP (ref 11.5–15.5)
IMM GRANULOCYTES NFR BLD AUTO: 0.3 % — SIGNIFICANT CHANGE UP (ref 0–0.9)
INR BLD: 0.96 RATIO — SIGNIFICANT CHANGE UP (ref 0.85–1.16)
LACTATE SERPL-SCNC: 0.8 MMOL/L — SIGNIFICANT CHANGE UP (ref 0.7–2)
LIDOCAIN IGE QN: 32 U/L — SIGNIFICANT CHANGE UP (ref 13–75)
LYMPHOCYTES # BLD AUTO: 1.04 K/UL — SIGNIFICANT CHANGE UP (ref 1–3.3)
LYMPHOCYTES # BLD AUTO: 15.9 % — SIGNIFICANT CHANGE UP (ref 13–44)
MCHC RBC-ENTMCNC: 33.5 G/DL — SIGNIFICANT CHANGE UP (ref 32–36)
MCHC RBC-ENTMCNC: 34.2 PG — HIGH (ref 27–34)
MCV RBC AUTO: 102.2 FL — HIGH (ref 80–100)
MONOCYTES # BLD AUTO: 1 K/UL — HIGH (ref 0–0.9)
MONOCYTES NFR BLD AUTO: 15.3 % — HIGH (ref 2–14)
NEUTROPHILS # BLD AUTO: 4.05 K/UL — SIGNIFICANT CHANGE UP (ref 1.8–7.4)
NEUTROPHILS NFR BLD AUTO: 61.8 % — SIGNIFICANT CHANGE UP (ref 43–77)
PLATELET # BLD AUTO: 347 K/UL — SIGNIFICANT CHANGE UP (ref 150–400)
POTASSIUM SERPL-MCNC: 4.1 MMOL/L — SIGNIFICANT CHANGE UP (ref 3.5–5.3)
POTASSIUM SERPL-SCNC: 4.1 MMOL/L — SIGNIFICANT CHANGE UP (ref 3.5–5.3)
PROT SERPL-MCNC: 7.3 GM/DL — SIGNIFICANT CHANGE UP (ref 6–8.3)
PROTHROM AB SERPL-ACNC: 11.1 SEC — SIGNIFICANT CHANGE UP (ref 9.9–13.4)
RBC # BLD: 4.09 M/UL — SIGNIFICANT CHANGE UP (ref 3.8–5.2)
RBC # FLD: 13.5 % — SIGNIFICANT CHANGE UP (ref 10.3–14.5)
RSV RNA NPH QL NAA+NON-PROBE: SIGNIFICANT CHANGE UP
SARS-COV-2 RNA SPEC QL NAA+PROBE: SIGNIFICANT CHANGE UP
SODIUM SERPL-SCNC: 131 MMOL/L — LOW (ref 135–145)
WBC # BLD: 6.55 K/UL — SIGNIFICANT CHANGE UP (ref 3.8–10.5)
WBC # FLD AUTO: 6.55 K/UL — SIGNIFICANT CHANGE UP (ref 3.8–10.5)

## 2025-01-09 PROCEDURE — 71046 X-RAY EXAM CHEST 2 VIEWS: CPT

## 2025-01-09 PROCEDURE — 99285 EMERGENCY DEPT VISIT HI MDM: CPT | Mod: 25

## 2025-01-09 PROCEDURE — 94640 AIRWAY INHALATION TREATMENT: CPT

## 2025-01-09 PROCEDURE — 80053 COMPREHEN METABOLIC PANEL: CPT

## 2025-01-09 PROCEDURE — 36415 COLL VENOUS BLD VENIPUNCTURE: CPT

## 2025-01-09 PROCEDURE — 85730 THROMBOPLASTIN TIME PARTIAL: CPT

## 2025-01-09 PROCEDURE — 85610 PROTHROMBIN TIME: CPT

## 2025-01-09 PROCEDURE — 96374 THER/PROPH/DIAG INJ IV PUSH: CPT

## 2025-01-09 PROCEDURE — 74177 CT ABD & PELVIS W/CONTRAST: CPT | Mod: MC

## 2025-01-09 PROCEDURE — 83605 ASSAY OF LACTIC ACID: CPT

## 2025-01-09 PROCEDURE — 85025 COMPLETE CBC W/AUTO DIFF WBC: CPT

## 2025-01-09 PROCEDURE — 71046 X-RAY EXAM CHEST 2 VIEWS: CPT | Mod: 26

## 2025-01-09 PROCEDURE — 83690 ASSAY OF LIPASE: CPT

## 2025-01-09 PROCEDURE — 87040 BLOOD CULTURE FOR BACTERIA: CPT

## 2025-01-09 PROCEDURE — 99285 EMERGENCY DEPT VISIT HI MDM: CPT

## 2025-01-09 PROCEDURE — 74177 CT ABD & PELVIS W/CONTRAST: CPT | Mod: 26

## 2025-01-09 PROCEDURE — 0241U: CPT

## 2025-01-09 RX ORDER — IPRATROPIUM BROMIDE AND ALBUTEROL SULFATE .5; 2.5 MG/3ML; MG/3ML
3 SOLUTION RESPIRATORY (INHALATION)
Refills: 0 | Status: COMPLETED | OUTPATIENT
Start: 2025-01-09 | End: 2025-01-09

## 2025-01-09 RX ORDER — PREDNISONE 5 MG
1 TABLET ORAL
Qty: 4 | Refills: 0
Start: 2025-01-09 | End: 2025-01-12

## 2025-01-09 RX ORDER — METHYLPREDNISOLONE 4 MG/1
125 TABLET ORAL ONCE
Refills: 0 | Status: COMPLETED | OUTPATIENT
Start: 2025-01-09 | End: 2025-01-09

## 2025-01-09 RX ADMIN — IPRATROPIUM BROMIDE AND ALBUTEROL SULFATE 3 MILLILITER(S): .5; 2.5 SOLUTION RESPIRATORY (INHALATION) at 17:18

## 2025-01-09 RX ADMIN — IPRATROPIUM BROMIDE AND ALBUTEROL SULFATE 3 MILLILITER(S): .5; 2.5 SOLUTION RESPIRATORY (INHALATION) at 17:01

## 2025-01-09 RX ADMIN — IPRATROPIUM BROMIDE AND ALBUTEROL SULFATE 3 MILLILITER(S): .5; 2.5 SOLUTION RESPIRATORY (INHALATION) at 17:31

## 2025-01-09 RX ADMIN — METHYLPREDNISOLONE 125 MILLIGRAM(S): 4 TABLET ORAL at 17:00

## 2025-01-09 NOTE — ED STATDOCS - ATTENDING APP SHARED VISIT CONTRIBUTION OF CARE
I,Martinez Felix MD,  performed the initial face to face bedside interview with this patient regarding history of present illness, review of symptoms and relevant past medical, social and family history.  I completed an independent physical examination.  I was the initial provider who evaluated this patient. I have signed out the follow up of any pending tests (i.e. labs, radiological studies) to the ACP.  I have communicated the patient’s plan of care and disposition with the ACP.  The history, relevant review of systems, past medical and surgical history, medical decision making, and physical examination was documented by the scribe in my presence and I attest to the accuracy of the documentation.

## 2025-01-09 NOTE — ED STATDOCS - MUSCULOSKELETAL, MLM
range of motion is not limited and there is no muscle tenderness. no pedal edema, ambulatory with steady gait

## 2025-01-09 NOTE — ED ADULT NURSE NOTE - OBJECTIVE STATEMENT
78 y/o F presenting to ED with c/o upper abdominal  pain. endorses difficulty ambulating and shortness of breath when bending over, also + cough over the past 1 month. primary care physician advised patient to come to ED. she is a 1 pack/day smoker.

## 2025-01-09 NOTE — ED ADULT NURSE NOTE - TEMPLATE
Detail Level: Simple
Initiate Treatment: Effudex on right cheek for 2-3 weeks. Use on nose,cheek and temples nightly for 2 weeks follow up 3 months.
General

## 2025-01-09 NOTE — ED STATDOCS - CARE PLAN
Principal Discharge DX:	Viral URI with cough  Secondary Diagnosis:	Bronchitis  Secondary Diagnosis:	Epigastric abdominal pain   1

## 2025-01-09 NOTE — ED ADULT TRIAGE NOTE - CHIEF COMPLAINT QUOTE
Patient presents to the ER with complaints of abdominal pain and cough for a month. Patient had outpatient ultrasound which was negative and advised to come here

## 2025-01-09 NOTE — ED STATDOCS - NSFOLLOWUPINSTRUCTIONS_ED_ALL_ED_FT
Referral Dept should call to arrange follow-up with Pulmonologist.      Continue to use albuterol inhaler every 4 -6 hours for coughing, difficulty breathing,      Use Prednisone once a day.      Pt has a lesion in Right kidney that is too small to characterize, but should make Primary Care doctor aware. Reading is present in paperwork.             ACUTE BRONCHITIS - General Information    Acute Bronchitis    WHAT YOU NEED TO KNOW:    What is acute bronchitis? Acute bronchitis is swelling and irritation in your lungs. It is usually caused by a virus and most often happens in the winter. Bronchitis may also be caused by bacteria or by a chemical irritant, such as smoke.    What are the signs and symptoms of acute bronchitis?    Cough that lasts up to 3 weeks    Runny or stuffy nose    Hoarseness, sore throat    Fever    Feeling more tired than usual, and body aches    Wheezing or pain when you breathe or cough  How is acute bronchitis treated? You may need any of the following:    Cough suppressants decrease your urge to cough.    Decongestants help loosen mucus in your lungs and make it easier to cough up. This can help you breathe easier.    Inhalers may be given. Your healthcare provider may give you one or more inhalers to help you breathe easier and cough less. An inhaler gives you medicine to open your airways. Ask your healthcare provider to show you how to use your inhaler correctly.  Metered Dose Inhaler      Antiviral medicine treats infections caused by a virus.    Antibiotics may be given if your bronchitis is caused by bacteria or if you have lung condition.    Acetaminophen decreases pain and fever. It is available without a doctor's order. Ask how much to take and how often to take it. Follow directions. Read the labels of all other medicines you are using to see if they also contain acetaminophen, or ask your doctor or pharmacist. Acetaminophen can cause liver damage if not taken correctly.    NSAIDs help decrease swelling and pain or fever. This medicine is available with or without a doctor's order. NSAIDs can cause stomach bleeding or kidney problems in certain people. If you take blood thinner medicine, always ask your healthcare provider if NSAIDs are safe for you. Always read the medicine label and follow directions.  How can I manage my symptoms?    Drink liquids as directed. You may need to drink more liquids than usual to stay hydrated. Ask how much liquid to drink each day and which liquids are best for you.    Use a cool mist humidifier. This increases air moisture in your home. This may make it easier for you to breathe and help decrease your cough.  Humidifier      Get more rest. Rest helps your body to heal. Slowly start to do more each day. Rest when you feel it is needed.  How can I help prevent acute bronchitis?      Ask about vaccines you may need. Get a flu vaccine each year as soon as recommended, usually in September or October. Ask your healthcare provider if you should also get a pneumonia or COVID-19 vaccine. Your healthcare provider can tell you if you should also get other vaccines, and when to get them.    Prevent the spread of germs.  Wash your hands often with soap and water. Carry germ-killing hand lotion or gel with you. You can use the lotion or gel to clean your hands when soap and water are not available.  Handwashing      Do not touch your eyes, nose, or mouth unless you have washed your hands first.    Always cover your mouth when you cough to prevent the spread of germs. It is best to cough into a tissue or your shirt sleeve instead of into your hand. Ask those around you to cover their mouths when they cough.    Try to avoid people who have a cold or the flu. If you are sick, stay away from others as much as possible.    Avoid irritants in the air. Avoid chemicals, fumes, and dust. Wear a face mask if you must work around dust or fumes. Stay inside on days when air pollution levels are high. If you have allergies, stay inside when pollen counts are high. Do not use aerosol products, such as spray-on deodorant, bug spray, and hair spray.    Do not smoke or be around others who are smoking. Nicotine and other chemicals in cigarettes and cigars can cause lung damage. Ask your healthcare provider for information if you currently smoke and need help to quit. E-cigarettes or smokeless tobacco still contain nicotine. Talk to your healthcare provider before you use these products.  When should I seek immediate care?    You cough up blood.    Your lips or fingernails turn blue.    You feel like you are not getting enough air when you breathe.  When should I call my doctor?    Your symptoms do not go away or get worse, even after treatment.    Your cough does not get better within 4 weeks.    You have questions or concerns about your condition or care.  CARE AGREEMENT:    You have the right to help plan your care. Learn about your health condition and how it may be treated. Discuss treatment options with your healthcare providers to decide what care you want to receive. You always have the right to refuse treatment.    © [employer] 1973, 2025    	  back to top            © [employer] 1973, 2025

## 2025-01-09 NOTE — ED STATDOCS - PROGRESS NOTE DETAILS
79-year-old female with past medical history of hypertension COPD polycythemia vera breast cancer bipolar and depression presents to the ED complaining of difficulty breathing.  Patient reports that when she bends over she has difficulty breathing.  Patient also reporting upper abdominal pain will follow-up labs and CXR.    Patient is ambulatory sat was 91 to 93% while walking to [employer].  Patient will be reevaluated after meds.  Alejandra Patino PA-C Pt amb sat was 93%.  Repeat O2 sat was 94%. Pt amb sat was 93%.  Repeat O2 sat after nebs was 94%.  On re-eval, pt is In super track in no apparent distress.  Patient is speaking in full sentences.  Patient with mild expiratory wheezing.  O2 sat on room air is 93 to 94%.  Patient swab was negative for flu COVID and RSV.  White count was normal sodium was mildly depleted to 131 potassium was 4.1 lactate was 0.8.  Heart with regular rate and rhythm.  Chest x-ray was officially read by radiology showing no acute cardiopulmonary abnormalities.  CT scan of abdomen showed no acute CT findings specifically no bowel obstruction or inflammation.  Pt will be dc home on PO Prednisone for 4 days to continue albuterol every 4-6 hours.  Refer to Pulmonology for re-eval.  Will dc home.  Alejandra Patino PA-C

## 2025-01-09 NOTE — ED STATDOCS - OBJECTIVE STATEMENT
80 y/o female w/ PMHx HTN, spinal stenosis, COPD, polycythemia vera, breast cancer, lumbar stenosis, bipolar 1 disorder, and depression presents c/o upper abdominal  pain. Pt reports trouble walking and cough x1mo. No vomiting  or urinary symptoms. Pt had an outpatient  ultrasound which was negative yesterday, and primary care physician advised patient to come to ED. Pt is a smoker. No other complaints at this time.

## 2025-01-09 NOTE — ED STATDOCS - PATIENT PORTAL LINK FT
You can access the FollowMyHealth Patient Portal offered by [facility] by registering at the following website: [email or web address]. By joining [employer]’s FollowMyHealth portal, you will also be able to view your health information using other applications (apps) compatible with our system.

## 2025-01-09 NOTE — ED STATDOCS - CLINICAL SUMMARY MEDICAL DECISION MAKING FREE TEXT BOX
79 year-old female brought by  for evaluation, with shortness of breath, current/long term smoker, likely undiagnosed COPD. Will give nebs, steroids, chest x-ray. Pt also complaining of abdominal pain when bending with mild upper abdominal tenderness, will check labs and CT.

## 2025-01-09 NOTE — ED STATDOCS - NSICDXPASTMEDICALHX_GEN_ALL_CORE_FT
PAST MEDICAL HISTORY:  Bipolar 1 disorder     Breast cancer     Chronic obstructive pulmonary disease (COPD)     COVID-19 vaccine series completed     Depression     Hypertension     Lumbar stenosis     Polycythemia vera     Spinal stenosis of lumbosacral region     Tobacco use disorder

## 2025-01-14 LAB
CULTURE RESULTS: SIGNIFICANT CHANGE UP
CULTURE RESULTS: SIGNIFICANT CHANGE UP
SPECIMEN SOURCE: SIGNIFICANT CHANGE UP
SPECIMEN SOURCE: SIGNIFICANT CHANGE UP

## 2025-03-17 ENCOUNTER — OUTPATIENT (OUTPATIENT)
Dept: OUTPATIENT SERVICES | Facility: HOSPITAL | Age: 80
LOS: 1 days | Discharge: HOME | End: 2025-03-17

## 2025-03-17 DIAGNOSIS — Z98.890 OTHER SPECIFIED POSTPROCEDURAL STATES: Chronic | ICD-10-CM

## 2025-03-17 DIAGNOSIS — Z90.710 ACQUIRED ABSENCE OF BOTH CERVIX AND UTERUS: Chronic | ICD-10-CM

## 2025-03-17 DIAGNOSIS — H26.9 UNSPECIFIED CATARACT: Chronic | ICD-10-CM

## 2025-03-17 DIAGNOSIS — Z96.642 PRESENCE OF LEFT ARTIFICIAL HIP JOINT: Chronic | ICD-10-CM

## 2025-03-17 DIAGNOSIS — Z96.641 PRESENCE OF RIGHT ARTIFICIAL HIP JOINT: Chronic | ICD-10-CM

## 2025-03-17 DIAGNOSIS — C50.911 MALIGNANT NEOPLASM OF UNSPECIFIED SITE OF RIGHT FEMALE BREAST: ICD-10-CM

## 2025-03-17 DIAGNOSIS — D45 POLYCYTHEMIA VERA: ICD-10-CM

## 2025-03-19 ENCOUNTER — APPOINTMENT (OUTPATIENT)
Dept: HEMATOLOGY ONCOLOGY | Facility: CLINIC | Age: 80
End: 2025-03-19

## 2025-03-19 ENCOUNTER — RESULT REVIEW (OUTPATIENT)
Age: 80
End: 2025-03-19

## 2025-03-19 LAB
BASOPHILS # BLD AUTO: 0.07 K/UL — SIGNIFICANT CHANGE UP (ref 0–0.2)
BASOPHILS NFR BLD AUTO: 1.6 % — SIGNIFICANT CHANGE UP (ref 0–2)
EOSINOPHIL # BLD AUTO: 0.18 K/UL — SIGNIFICANT CHANGE UP (ref 0–0.5)
EOSINOPHIL NFR BLD AUTO: 4.2 % — SIGNIFICANT CHANGE UP (ref 0–6)
HCT VFR BLD CALC: 43.6 % — SIGNIFICANT CHANGE UP (ref 34.5–45)
HGB BLD-MCNC: 14.4 G/DL — SIGNIFICANT CHANGE UP (ref 11.5–15.5)
IMM GRANULOCYTES NFR BLD AUTO: 0.2 % — SIGNIFICANT CHANGE UP (ref 0–0.9)
LYMPHOCYTES # BLD AUTO: 1.27 K/UL — SIGNIFICANT CHANGE UP (ref 1–3.3)
LYMPHOCYTES # BLD AUTO: 29.4 % — SIGNIFICANT CHANGE UP (ref 13–44)
MCHC RBC-ENTMCNC: 31.2 PG — SIGNIFICANT CHANGE UP (ref 27–34)
MCHC RBC-ENTMCNC: 33 G/DL — SIGNIFICANT CHANGE UP (ref 32–36)
MCV RBC AUTO: 94.6 FL — SIGNIFICANT CHANGE UP (ref 80–100)
MONOCYTES # BLD AUTO: 0.49 K/UL — SIGNIFICANT CHANGE UP (ref 0–0.9)
MONOCYTES NFR BLD AUTO: 11.3 % — SIGNIFICANT CHANGE UP (ref 2–14)
NEUTROPHILS # BLD AUTO: 2.3 K/UL — SIGNIFICANT CHANGE UP (ref 1.8–7.4)
NEUTROPHILS NFR BLD AUTO: 53.3 % — SIGNIFICANT CHANGE UP (ref 43–77)
NRBC BLD AUTO-RTO: 0 /100 WBCS — SIGNIFICANT CHANGE UP (ref 0–0)
PLATELET # BLD AUTO: 330 K/UL — SIGNIFICANT CHANGE UP (ref 150–400)
RBC # BLD: 4.61 M/UL — SIGNIFICANT CHANGE UP (ref 3.8–5.2)
RBC # FLD: 14.4 % — SIGNIFICANT CHANGE UP (ref 10.3–14.5)
WBC # BLD: 4.32 K/UL — SIGNIFICANT CHANGE UP (ref 3.8–10.5)
WBC # FLD AUTO: 4.32 K/UL — SIGNIFICANT CHANGE UP (ref 3.8–10.5)

## 2025-03-20 ENCOUNTER — NON-APPOINTMENT (OUTPATIENT)
Age: 80
End: 2025-03-20

## 2025-04-09 ENCOUNTER — APPOINTMENT (OUTPATIENT)
Dept: PAIN MANAGEMENT | Facility: CLINIC | Age: 80
End: 2025-04-09

## 2025-04-09 DIAGNOSIS — M54.16 RADICULOPATHY, LUMBAR REGION: ICD-10-CM

## 2025-04-09 PROCEDURE — 99213 OFFICE O/P EST LOW 20 MIN: CPT

## 2025-05-07 ENCOUNTER — TRANSCRIPTION ENCOUNTER (OUTPATIENT)
Age: 80
End: 2025-05-07

## 2025-05-07 NOTE — ASU DISCHARGE PLAN (ADULT/PEDIATRIC) - FINANCIAL ASSISTANCE
[facility] provides services at a reduced cost to those who are determined to be eligible through [facility]’s financial assistance program. Information regarding [facility]’s financial assistance program can be found by going to [email or web address] or by calling [phone].

## 2025-05-08 ENCOUNTER — APPOINTMENT (OUTPATIENT)
Dept: ORTHOPEDIC SURGERY | Facility: HOSPITAL | Age: 80
End: 2025-05-08
Payer: MEDICARE

## 2025-05-08 ENCOUNTER — OUTPATIENT (OUTPATIENT)
Dept: OUTPATIENT SERVICES | Facility: HOSPITAL | Age: 80
LOS: 1 days | End: 2025-05-08
Payer: MEDICARE

## 2025-05-08 VITALS
RESPIRATION RATE: 18 BRPM | WEIGHT: 134.92 LBS | SYSTOLIC BLOOD PRESSURE: 158 MMHG | TEMPERATURE: 98 F | OXYGEN SATURATION: 97 % | HEART RATE: 64 BPM | DIASTOLIC BLOOD PRESSURE: 67 MMHG | HEIGHT: 64 IN

## 2025-05-08 VITALS
DIASTOLIC BLOOD PRESSURE: 65 MMHG | SYSTOLIC BLOOD PRESSURE: 151 MMHG | HEART RATE: 75 BPM | OXYGEN SATURATION: 96 % | RESPIRATION RATE: 20 BRPM

## 2025-05-08 DIAGNOSIS — Z90.710 ACQUIRED ABSENCE OF BOTH CERVIX AND UTERUS: Chronic | ICD-10-CM

## 2025-05-08 DIAGNOSIS — Z96.642 PRESENCE OF LEFT ARTIFICIAL HIP JOINT: Chronic | ICD-10-CM

## 2025-05-08 DIAGNOSIS — H26.9 UNSPECIFIED CATARACT: Chronic | ICD-10-CM

## 2025-05-08 DIAGNOSIS — Z96.641 PRESENCE OF RIGHT ARTIFICIAL HIP JOINT: Chronic | ICD-10-CM

## 2025-05-08 DIAGNOSIS — M54.16 RADICULOPATHY, LUMBAR REGION: ICD-10-CM

## 2025-05-08 DIAGNOSIS — Z98.890 OTHER SPECIFIED POSTPROCEDURAL STATES: Chronic | ICD-10-CM

## 2025-05-08 PROCEDURE — 62323 NJX INTERLAMINAR LMBR/SAC: CPT

## 2025-05-08 RX ORDER — IPRATROPIUM BROMIDE AND ALBUTEROL SULFATE .5; 2.5 MG/3ML; MG/3ML
3 SOLUTION RESPIRATORY (INHALATION)
Qty: 0 | Refills: 0 | DISCHARGE
Start: 2025-05-08

## 2025-05-08 RX ORDER — MELOXICAM 15 MG/1
0 TABLET ORAL
Refills: 0 | DISCHARGE

## 2025-05-08 RX ORDER — IPRATROPIUM BROMIDE AND ALBUTEROL SULFATE .5; 2.5 MG/3ML; MG/3ML
3 SOLUTION RESPIRATORY (INHALATION) ONCE
Refills: 0 | Status: ACTIVE | OUTPATIENT
Start: 2025-05-08

## 2025-05-08 RX ORDER — HYDROXYUREA 500 MG/1
15 CAPSULE ORAL
Refills: 0 | DISCHARGE

## 2025-05-08 NOTE — ASU PREOP CHECKLIST - ANTIBIOTIC
Hpi Title: Evaluation of Skin Lesions
How Severe Are Your Spot(S)?: mild
Have Your Spot(S) Been Treated In The Past?: has not been treated
n/a

## 2025-06-16 ENCOUNTER — OUTPATIENT (OUTPATIENT)
Dept: OUTPATIENT SERVICES | Facility: HOSPITAL | Age: 80
LOS: 1 days | Discharge: HOME | End: 2025-06-16

## 2025-06-16 DIAGNOSIS — Z96.641 PRESENCE OF RIGHT ARTIFICIAL HIP JOINT: Chronic | ICD-10-CM

## 2025-06-16 DIAGNOSIS — C50.911 MALIGNANT NEOPLASM OF UNSPECIFIED SITE OF RIGHT FEMALE BREAST: ICD-10-CM

## 2025-06-16 DIAGNOSIS — Z98.890 OTHER SPECIFIED POSTPROCEDURAL STATES: Chronic | ICD-10-CM

## 2025-06-16 DIAGNOSIS — Z96.642 PRESENCE OF LEFT ARTIFICIAL HIP JOINT: Chronic | ICD-10-CM

## 2025-06-16 DIAGNOSIS — D45 POLYCYTHEMIA VERA: ICD-10-CM

## 2025-06-16 DIAGNOSIS — Z90.710 ACQUIRED ABSENCE OF BOTH CERVIX AND UTERUS: Chronic | ICD-10-CM

## 2025-06-16 DIAGNOSIS — H26.9 UNSPECIFIED CATARACT: Chronic | ICD-10-CM

## 2025-06-18 ENCOUNTER — RESULT REVIEW (OUTPATIENT)
Age: 80
End: 2025-06-18

## 2025-06-18 ENCOUNTER — APPOINTMENT (OUTPATIENT)
Dept: HEMATOLOGY ONCOLOGY | Facility: CLINIC | Age: 80
End: 2025-06-18
Payer: MEDICARE

## 2025-06-18 VITALS
WEIGHT: 138 LBS | OXYGEN SATURATION: 93 % | TEMPERATURE: 97.4 F | BODY MASS INDEX: 23.69 KG/M2 | SYSTOLIC BLOOD PRESSURE: 145 MMHG | DIASTOLIC BLOOD PRESSURE: 81 MMHG | HEART RATE: 79 BPM

## 2025-06-18 LAB
BASOPHILS # BLD AUTO: 0.06 K/UL — SIGNIFICANT CHANGE UP (ref 0–0.2)
BASOPHILS NFR BLD AUTO: 1.1 % — SIGNIFICANT CHANGE UP (ref 0–2)
EOSINOPHIL # BLD AUTO: 0.29 K/UL — SIGNIFICANT CHANGE UP (ref 0–0.5)
EOSINOPHIL NFR BLD AUTO: 5.4 % — SIGNIFICANT CHANGE UP (ref 0–6)
HCT VFR BLD CALC: 42.1 % — SIGNIFICANT CHANGE UP (ref 34.5–45)
HGB BLD-MCNC: 13.8 G/DL — SIGNIFICANT CHANGE UP (ref 11.5–15.5)
IMM GRANULOCYTES # BLD AUTO: 0.01 K/UL — SIGNIFICANT CHANGE UP (ref 0–0.07)
IMM GRANULOCYTES NFR BLD AUTO: 0.2 % — SIGNIFICANT CHANGE UP (ref 0–0.9)
LYMPHOCYTES # BLD AUTO: 1.53 K/UL — SIGNIFICANT CHANGE UP (ref 1–3.3)
LYMPHOCYTES NFR BLD AUTO: 28.4 % — SIGNIFICANT CHANGE UP (ref 13–44)
MCHC RBC-ENTMCNC: 31.9 PG — SIGNIFICANT CHANGE UP (ref 27–34)
MCHC RBC-ENTMCNC: 32.8 G/DL — SIGNIFICANT CHANGE UP (ref 32–36)
MCV RBC AUTO: 97.5 FL — SIGNIFICANT CHANGE UP (ref 80–100)
MONOCYTES # BLD AUTO: 0.56 K/UL — SIGNIFICANT CHANGE UP (ref 0–0.9)
MONOCYTES NFR BLD AUTO: 10.4 % — SIGNIFICANT CHANGE UP (ref 2–14)
NEUTROPHILS # BLD AUTO: 2.93 K/UL — SIGNIFICANT CHANGE UP (ref 1.8–7.4)
NEUTROPHILS NFR BLD AUTO: 54.5 % — SIGNIFICANT CHANGE UP (ref 43–77)
NRBC # BLD AUTO: 0 K/UL — SIGNIFICANT CHANGE UP (ref 0–0)
NRBC # FLD: 0 K/UL — SIGNIFICANT CHANGE UP (ref 0–0)
NRBC BLD AUTO-RTO: 0 /100 WBCS — SIGNIFICANT CHANGE UP (ref 0–0)
PLATELET # BLD AUTO: 423 K/UL — HIGH (ref 150–400)
PMV BLD: 9 FL — SIGNIFICANT CHANGE UP (ref 7–13)
RBC # BLD: 4.32 M/UL — SIGNIFICANT CHANGE UP (ref 3.8–5.2)
RBC # FLD: 16.5 % — HIGH (ref 10.3–14.5)
WBC # BLD: 5.38 K/UL — SIGNIFICANT CHANGE UP (ref 3.8–10.5)
WBC # FLD AUTO: 5.38 K/UL — SIGNIFICANT CHANGE UP (ref 3.8–10.5)

## 2025-06-18 PROCEDURE — 99213 OFFICE O/P EST LOW 20 MIN: CPT

## 2025-06-18 PROCEDURE — G2211 COMPLEX E/M VISIT ADD ON: CPT

## 2025-06-23 DIAGNOSIS — Z51.81 ENCOUNTER FOR THERAPEUTIC DRUG LEVEL MONITORING: ICD-10-CM

## 2025-09-09 ENCOUNTER — APPOINTMENT (OUTPATIENT)
Dept: HEMATOLOGY ONCOLOGY | Facility: CLINIC | Age: 80
End: 2025-09-09

## 2025-09-09 ENCOUNTER — RESULT REVIEW (OUTPATIENT)
Age: 80
End: 2025-09-09

## (undated) DEVICE — CATH IV SAFE BC 22G X 1" (BLUE)

## (undated) DEVICE — GLV 8.5 PROTEXIS (WHITE)

## (undated) DEVICE — SOL INJ LR 500ML

## (undated) DEVICE — TUBING ALARIS PUMP MODULE NON-DEHP

## (undated) DEVICE — SYR IV FLUSH SALINE 10ML 30/TY

## (undated) DEVICE — NDL SPINAL 22G X 3.5" QUINCKE

## (undated) DEVICE — TRAY EPIDURAL SINGLE DOSE

## (undated) DEVICE — GLV 7.5 ULTRAFREE MAX

## (undated) DEVICE — TUBING IV EXTENSION MACRO W CLAVE 7"

## (undated) DEVICE — PACK IV START WITH CHG

## (undated) DEVICE — NDL SPNL WHIT 22GX3.5IN

## (undated) DEVICE — STYLET  ENDOTRACH 7.5MM X 10MM

## (undated) DEVICE — CATH IV SAFE BC BLU 22GAX1.0"

## (undated) DEVICE — CATH IV SAFE 24GX3/4